# Patient Record
Sex: FEMALE | Race: ASIAN | Employment: FULL TIME | URBAN - METROPOLITAN AREA
[De-identification: names, ages, dates, MRNs, and addresses within clinical notes are randomized per-mention and may not be internally consistent; named-entity substitution may affect disease eponyms.]

---

## 2017-05-26 ENCOUNTER — EMPLOYEE WELLNESS (OUTPATIENT)
Dept: OTHER | Age: 47
End: 2017-05-26

## 2017-05-26 LAB
CHOLESTEROL/HDL RATIO: 3.4
CHOLESTEROL: 165 MG/DL
GLUCOSE BLD-MCNC: 89 MG/DL (ref 70–99)
HDLC SERPL-MCNC: 48 MG/DL
LDL CHOLESTEROL: 98 MG/DL (ref 0–130)
PATIENT FASTING?: YES
TRIGL SERPL-MCNC: 94 MG/DL
VLDLC SERPL CALC-MCNC: NORMAL MG/DL (ref 1–30)

## 2017-08-09 ENCOUNTER — HOSPITAL ENCOUNTER (EMERGENCY)
Facility: CLINIC | Age: 47
Discharge: LEFT BEFORE DISCHARGE | End: 2017-08-09
Attending: EMERGENCY MEDICINE
Payer: COMMERCIAL

## 2017-08-17 ENCOUNTER — TELEPHONE (OUTPATIENT)
Dept: FAMILY MEDICINE CLINIC | Age: 47
End: 2017-08-17

## 2017-08-17 DIAGNOSIS — R06.83 SNORING: Primary | ICD-10-CM

## 2017-08-17 DIAGNOSIS — R40.0 DAYTIME SLEEPINESS: ICD-10-CM

## 2017-08-17 DIAGNOSIS — G47.33 OSA (OBSTRUCTIVE SLEEP APNEA): ICD-10-CM

## 2017-08-25 ENCOUNTER — OFFICE VISIT (OUTPATIENT)
Dept: FAMILY MEDICINE CLINIC | Age: 47
End: 2017-08-25
Payer: COMMERCIAL

## 2017-08-25 VITALS
BODY MASS INDEX: 34.02 KG/M2 | SYSTOLIC BLOOD PRESSURE: 120 MMHG | HEART RATE: 68 BPM | DIASTOLIC BLOOD PRESSURE: 76 MMHG | HEIGHT: 63 IN | WEIGHT: 192 LBS | TEMPERATURE: 99.2 F | RESPIRATION RATE: 20 BRPM

## 2017-08-25 DIAGNOSIS — R40.0 DAYTIME SOMNOLENCE: ICD-10-CM

## 2017-08-25 DIAGNOSIS — R06.83 SNORING: Primary | ICD-10-CM

## 2017-08-25 DIAGNOSIS — M25.562 ACUTE PAIN OF LEFT KNEE: ICD-10-CM

## 2017-08-25 DIAGNOSIS — Z13.820 SCREENING FOR OSTEOPOROSIS: ICD-10-CM

## 2017-08-25 DIAGNOSIS — G47.33 OSA (OBSTRUCTIVE SLEEP APNEA): ICD-10-CM

## 2017-08-25 DIAGNOSIS — G47.33 OBSTRUCTIVE SLEEP APNEA SYNDROME: ICD-10-CM

## 2017-08-25 DIAGNOSIS — Z12.39 SCREENING FOR BREAST CANCER: ICD-10-CM

## 2017-08-25 DIAGNOSIS — M25.561 ACUTE PAIN OF RIGHT KNEE: ICD-10-CM

## 2017-08-25 DIAGNOSIS — Z00.00 ANNUAL PHYSICAL EXAM: ICD-10-CM

## 2017-08-25 DIAGNOSIS — E03.9 HYPOTHYROIDISM, UNSPECIFIED TYPE: ICD-10-CM

## 2017-08-25 PROCEDURE — 99214 OFFICE O/P EST MOD 30 MIN: CPT | Performed by: PEDIATRICS

## 2017-08-25 ASSESSMENT — ENCOUNTER SYMPTOMS
CHOKING: 0
SHORTNESS OF BREATH: 1
COUGH: 0
DIARRHEA: 0
VOMITING: 0
EYE DISCHARGE: 0
WHEEZING: 0
EYE PAIN: 0
EYE REDNESS: 0
PHOTOPHOBIA: 0
CONSTIPATION: 0
COLOR CHANGE: 0
ABDOMINAL PAIN: 0
STRIDOR: 0

## 2017-08-26 ENCOUNTER — HOSPITAL ENCOUNTER (OUTPATIENT)
Dept: GENERAL RADIOLOGY | Age: 47
Discharge: HOME | End: 2017-08-26
Payer: COMMERCIAL

## 2017-08-26 ENCOUNTER — HOSPITAL ENCOUNTER (OUTPATIENT)
Age: 47
Discharge: HOME | End: 2017-08-26
Payer: COMMERCIAL

## 2017-08-26 DIAGNOSIS — Z00.00 ANNUAL PHYSICAL EXAM: ICD-10-CM

## 2017-08-26 DIAGNOSIS — E03.9 HYPOTHYROIDISM, UNSPECIFIED TYPE: ICD-10-CM

## 2017-08-26 DIAGNOSIS — M25.562 ACUTE PAIN OF LEFT KNEE: ICD-10-CM

## 2017-08-26 DIAGNOSIS — M25.561 ACUTE PAIN OF RIGHT KNEE: ICD-10-CM

## 2017-08-26 LAB
ALBUMIN SERPL-MCNC: 3.9 G/DL (ref 3.5–5.2)
ALBUMIN/GLOBULIN RATIO: NORMAL (ref 1–2.5)
ALP BLD-CCNC: 88 U/L (ref 35–104)
ALT SERPL-CCNC: 13 U/L (ref 5–33)
ANION GAP SERPL CALCULATED.3IONS-SCNC: 10 MMOL/L (ref 9–17)
AST SERPL-CCNC: 9 U/L
BILIRUB SERPL-MCNC: 0.58 MG/DL (ref 0.3–1.2)
BILIRUBIN URINE: NEGATIVE
BUN BLDV-MCNC: 8 MG/DL (ref 6–20)
BUN/CREAT BLD: 12 (ref 9–20)
CALCIUM SERPL-MCNC: 9.8 MG/DL (ref 8.6–10.4)
CHLORIDE BLD-SCNC: 105 MMOL/L (ref 98–107)
CO2: 24 MMOL/L (ref 20–31)
COLOR: YELLOW
COMMENT UA: ABNORMAL
CREAT SERPL-MCNC: 0.67 MG/DL (ref 0.5–0.9)
GFR AFRICAN AMERICAN: >60 ML/MIN
GFR NON-AFRICAN AMERICAN: >60 ML/MIN
GFR SERPL CREATININE-BSD FRML MDRD: NORMAL ML/MIN/{1.73_M2}
GFR SERPL CREATININE-BSD FRML MDRD: NORMAL ML/MIN/{1.73_M2}
GLUCOSE BLD-MCNC: 97 MG/DL (ref 70–99)
GLUCOSE URINE: NEGATIVE
HCT VFR BLD CALC: 40.6 % (ref 36–46)
HEMOGLOBIN: 13.5 G/DL (ref 12–16)
KETONES, URINE: NEGATIVE
LEUKOCYTE ESTERASE, URINE: NEGATIVE
MCH RBC QN AUTO: 28.5 PG (ref 26–34)
MCHC RBC AUTO-ENTMCNC: 33.4 G/DL (ref 31–37)
MCV RBC AUTO: 85.5 FL (ref 80–100)
NITRITE, URINE: NEGATIVE
PDW BLD-RTO: 13.3 % (ref 11.5–14.5)
PH UA: 5 (ref 5–8)
PLATELET # BLD: 302 K/UL (ref 130–400)
PMV BLD AUTO: NORMAL FL (ref 6–12)
POTASSIUM SERPL-SCNC: 3.9 MMOL/L (ref 3.7–5.3)
PROTEIN UA: NEGATIVE
RBC # BLD: 4.74 M/UL (ref 4–5.2)
SODIUM BLD-SCNC: 139 MMOL/L (ref 135–144)
SPECIFIC GRAVITY UA: 1 (ref 1–1.03)
THYROXINE, FREE: 1.61 NG/DL (ref 0.93–1.7)
TOTAL PROTEIN: 7.5 G/DL (ref 6.4–8.3)
TSH SERPL DL<=0.05 MIU/L-ACNC: 0.63 MIU/L (ref 0.3–5)
TURBIDITY: CLEAR
URINE HGB: NEGATIVE
UROBILINOGEN, URINE: NORMAL
VITAMIN D 25-HYDROXY: 24 NG/ML (ref 30–100)
WBC # BLD: 7.4 K/UL (ref 3.5–11)

## 2017-08-26 PROCEDURE — 73560 X-RAY EXAM OF KNEE 1 OR 2: CPT

## 2017-08-26 PROCEDURE — 81003 URINALYSIS AUTO W/O SCOPE: CPT

## 2017-08-26 PROCEDURE — 82306 VITAMIN D 25 HYDROXY: CPT

## 2017-08-26 PROCEDURE — 85027 COMPLETE CBC AUTOMATED: CPT

## 2017-08-26 PROCEDURE — 36415 COLL VENOUS BLD VENIPUNCTURE: CPT

## 2017-08-26 PROCEDURE — 84443 ASSAY THYROID STIM HORMONE: CPT

## 2017-08-26 PROCEDURE — 80053 COMPREHEN METABOLIC PANEL: CPT

## 2017-08-26 PROCEDURE — 84439 ASSAY OF FREE THYROXINE: CPT

## 2017-08-30 ENCOUNTER — HOSPITAL ENCOUNTER (OUTPATIENT)
Dept: MAMMOGRAPHY | Facility: CLINIC | Age: 47
Discharge: HOME | End: 2017-08-30
Payer: COMMERCIAL

## 2017-08-30 DIAGNOSIS — Z13.820 SCREENING FOR OSTEOPOROSIS: ICD-10-CM

## 2017-08-30 DIAGNOSIS — Z12.39 SCREENING FOR BREAST CANCER: ICD-10-CM

## 2017-08-30 PROCEDURE — 77080 DXA BONE DENSITY AXIAL: CPT

## 2017-08-30 PROCEDURE — G0202 SCR MAMMO BI INCL CAD: HCPCS

## 2017-08-31 DIAGNOSIS — E03.9 HYPOTHYROIDISM, UNSPECIFIED TYPE: ICD-10-CM

## 2017-08-31 DIAGNOSIS — E55.9 VITAMIN D DEFICIENCY: Primary | ICD-10-CM

## 2017-09-06 ENCOUNTER — HOSPITAL ENCOUNTER (OUTPATIENT)
Dept: SLEEP CENTER | Age: 47
Discharge: HOME | End: 2017-09-06
Payer: COMMERCIAL

## 2017-09-06 VITALS — WEIGHT: 188 LBS | BODY MASS INDEX: 33.31 KG/M2 | HEIGHT: 63 IN

## 2017-09-06 DIAGNOSIS — R06.83 SNORING: ICD-10-CM

## 2017-09-06 DIAGNOSIS — G47.33 OSA (OBSTRUCTIVE SLEEP APNEA): ICD-10-CM

## 2017-09-06 DIAGNOSIS — R40.0 DAYTIME SLEEPINESS: ICD-10-CM

## 2017-09-27 ENCOUNTER — OFFICE VISIT (OUTPATIENT)
Dept: FAMILY MEDICINE CLINIC | Age: 47
End: 2017-09-27
Payer: COMMERCIAL

## 2017-09-27 VITALS
TEMPERATURE: 98.8 F | SYSTOLIC BLOOD PRESSURE: 118 MMHG | BODY MASS INDEX: 34.19 KG/M2 | HEART RATE: 64 BPM | RESPIRATION RATE: 18 BRPM | DIASTOLIC BLOOD PRESSURE: 76 MMHG | WEIGHT: 193 LBS

## 2017-09-27 DIAGNOSIS — E55.9 VITAMIN D DEFICIENCY: ICD-10-CM

## 2017-09-27 DIAGNOSIS — R40.0 DAYTIME SOMNOLENCE: ICD-10-CM

## 2017-09-27 DIAGNOSIS — E06.3 HYPOTHYROIDISM DUE TO HASHIMOTO'S THYROIDITIS: ICD-10-CM

## 2017-09-27 DIAGNOSIS — Z23 NEED FOR TDAP VACCINATION: ICD-10-CM

## 2017-09-27 DIAGNOSIS — E03.8 HYPOTHYROIDISM DUE TO HASHIMOTO'S THYROIDITIS: ICD-10-CM

## 2017-09-27 DIAGNOSIS — Z00.00 ROUTINE PHYSICAL EXAMINATION: Primary | ICD-10-CM

## 2017-09-27 DIAGNOSIS — R06.83 SNORING: ICD-10-CM

## 2017-09-27 DIAGNOSIS — G47.33 OBSTRUCTIVE SLEEP APNEA SYNDROME: ICD-10-CM

## 2017-09-27 DIAGNOSIS — J45.20 MILD INTERMITTENT ASTHMA WITHOUT COMPLICATION: ICD-10-CM

## 2017-09-27 DIAGNOSIS — G43.909 MIGRAINE WITHOUT STATUS MIGRAINOSUS, NOT INTRACTABLE, UNSPECIFIED MIGRAINE TYPE: ICD-10-CM

## 2017-09-27 DIAGNOSIS — J30.1 SEASONAL ALLERGIC RHINITIS DUE TO POLLEN: ICD-10-CM

## 2017-09-27 DIAGNOSIS — M81.0 OSTEOPOROSIS, UNSPECIFIED OSTEOPOROSIS TYPE, UNSPECIFIED PATHOLOGICAL FRACTURE PRESENCE: ICD-10-CM

## 2017-09-27 DIAGNOSIS — E66.9 OBESITY, UNSPECIFIED OBESITY SEVERITY, UNSPECIFIED OBESITY TYPE: ICD-10-CM

## 2017-09-27 DIAGNOSIS — K21.9 GASTROESOPHAGEAL REFLUX DISEASE, ESOPHAGITIS PRESENCE NOT SPECIFIED: ICD-10-CM

## 2017-09-27 PROCEDURE — 99396 PREV VISIT EST AGE 40-64: CPT | Performed by: PEDIATRICS

## 2017-09-27 PROCEDURE — 90715 TDAP VACCINE 7 YRS/> IM: CPT | Performed by: PEDIATRICS

## 2017-09-27 PROCEDURE — 90471 IMMUNIZATION ADMIN: CPT | Performed by: PEDIATRICS

## 2017-09-27 RX ORDER — MONTELUKAST SODIUM 10 MG/1
10 TABLET ORAL NIGHTLY
Qty: 90 TABLET | Refills: 3 | Status: SHIPPED | OUTPATIENT
Start: 2017-09-27 | End: 2018-12-05 | Stop reason: SDUPTHER

## 2017-09-27 ASSESSMENT — ENCOUNTER SYMPTOMS
EYE PAIN: 0
RHINORRHEA: 0
STRIDOR: 0
CONSTIPATION: 0
DIARRHEA: 0
VOMITING: 0
TROUBLE SWALLOWING: 0
COUGH: 0
EYE REDNESS: 0
SINUS PRESSURE: 0
BLOOD IN STOOL: 0
PHOTOPHOBIA: 0
NAUSEA: 0
VOICE CHANGE: 0
WHEEZING: 0
EYE DISCHARGE: 0
ABDOMINAL PAIN: 0
SHORTNESS OF BREATH: 0
COLOR CHANGE: 0

## 2017-09-27 ASSESSMENT — PATIENT HEALTH QUESTIONNAIRE - PHQ9
SUM OF ALL RESPONSES TO PHQ9 QUESTIONS 1 & 2: 0
1. LITTLE INTEREST OR PLEASURE IN DOING THINGS: 0
SUM OF ALL RESPONSES TO PHQ QUESTIONS 1-9: 0
2. FEELING DOWN, DEPRESSED OR HOPELESS: 0

## 2017-09-27 NOTE — MR AVS SNAPSHOT
After Visit Summary             Nahum Adrian   2017 10:40 AM   Office Visit    Description:  Female : 1970   Provider:  Colletta Legions, MD   Department:  [address] [place] [address] and Future Appointments         Below is a list of your follow-up and future appointments. This may not be a complete list as you may have made appointments directly with providers that we are not aware of or your providers may have made some for you. Please call your providers to confirm appointments. It is important to keep your appointments. Please bring your current insurance card, photo ID, co-pay, and all medication bottles to your appointment. If self-pay, payment is expected at the time of service. Information from Your Visit        Department     Name Address Phone Fax    [address] [facility] [phone]      You Were Seen for:         Comments    Routine physical examination   [[id number]]         Vital Signs     Blood Pressure Pulse Temperature Respirations Weight Last Menstrual Period    118/76 (Site: Right Arm, Position: Sitting, Cuff Size: Medium Adult) 64 98.8 °F (37.1 °C) (Tympanic) 18 193 lb (87.5 kg) 2017 (Exact Date)    Breastfeeding? Body Mass Index Smoking Status             No 34.19 kg/m2 Never Smoker         Additional Information about your Body Mass Index (BMI)           Your BMI as listed above is considered obese (30 or more). BMI is an estimate of body fat, calculated from your height and weight. The higher your BMI, the greater your risk of heart disease, high blood pressure, type 2 diabetes, stroke, gallstones, arthritis, sleep apnea, and certain cancers. BMI is not perfect. It may overestimate body fat in athletes and people who are more muscular.   Even a small weight loss (between 5 and 10

## 2017-09-27 NOTE — PROGRESS NOTES
is taking a daily calcium / vitamin D supplement (1200mg / 1600mg). She did have a recent dexascan that shows that she is now osteoporotic. She is still pre-menopausal.  She does have a history of asthma (diagnosed with PFTs in residency) and allergies that are currently well controlled with singulair, allegra and flonase. She has a history of GERD that is treated with prilosec as needed. She was supposed to have a sleep study done recently but she ended up not having this because she was going to have to pay a large fee. She is thinking about still having this done once she can make sure this is covered. She did have an original sleep study done about 15 years ago at [facility] at [address] [place] [address] in [place]. She is trying to work on getting her weight down. She has a history of migraine headaches that are controlled with maxalt as needed. She does follow with Dr. Audrey Pulido for GYN care. She tells me she was having a lot of urinary frequency and she saw Dr. Quan Suazo who recommended a hysterectomy. She did not wish to do this so when she was diagnosed with fibroid tumors and had an embolization by IR last October and her symptoms of heavy cycles improved. She does have several concerns. She complains of continued halitosis. She has actually treated herself for H. Pylori, parasites and for a yeast infection of the mouth with diflucan without any improvement. She saw ENT who did not have anything to offer and she saw a dentist who had nothing to offer. Her sense of smell has gone down and her hearing seems a little worse. She has had several small areas of rash over the body for the last week or so but this does seem to be getting better. She did have a blister on the left side of the tongue but this seems to be doing better as well. cmw      Review of Systems   Constitutional: Positive for fatigue (daytime fatigue). Negative for appetite change and fever.    HENT: Negative for congestion, ear pain, hearing loss, postnasal drip, rhinorrhea, sinus pressure, tinnitus, trouble swallowing and voice change. Allergies well controlled with singulair, allegra and flonase. She does complain of continued halitosis. Eyes: Negative for photophobia, pain, discharge and redness. Respiratory: Positive for apnea (history of SACAH diagnosed 15 years ago). Negative for cough, shortness of breath, wheezing and stridor. Snoring   Cardiovascular: Negative for chest pain, palpitations and leg swelling. Gastrointestinal: Negative for abdominal pain, blood in stool, constipation, diarrhea, nausea and vomiting. Occasional heartburn responds to omeprazole 40mg once daily    Endocrine: Negative for polydipsia and polyphagia. Hypothyroidism controlled. Genitourinary: Negative for dysuria, frequency, hematuria and pelvic pain. Skin: Negative for color change and rash. Allergic/Immunologic: Positive for environmental allergies. Negative for food allergies and immunocompromised state. Neurological: Positive for headaches (history of migraine headaches). Negative for dizziness, weakness, light-headedness and numbness. Psychiatric/Behavioral: Negative for agitation, decreased concentration, dysphoric mood and sleep disturbance. The patient is not nervous/anxious. Objective:   Physical Exam   Constitutional: She is oriented to person, place, and time. She appears well-developed and well-nourished. obese   HENT:   Head: Normocephalic. Right Ear: External ear normal.   Left Ear: External ear normal.   Nose: Nose normal.   Mouth/Throat: Oropharynx is clear and moist. No oropharyngeal exudate. I am not able to appreciate any abnormal smell to the patient's breath. Eyes: Conjunctivae, EOM and lids are normal. Pupils are equal, round, and reactive to light. Right eye exhibits no discharge. Left eye exhibits no discharge. No scleral icterus.    Neck: Trachea normal and normal range of motion. Neck supple. Carotid bruit is not present. Cardiovascular: Normal rate, regular rhythm and normal heart sounds. Exam reveals no distant heart sounds. No murmur heard. Pulses:       Carotid pulses are 2+ on the right side, and 2+ on the left side. Radial pulses are 2+ on the right side, and 2+ on the left side. Femoral pulses are 2+ on the right side, and 2+ on the left side. Pulmonary/Chest: Effort normal and breath sounds normal. No stridor. She has no wheezes. She has no rales. Abdominal: Soft. Normal appearance and bowel sounds are normal. There is no tenderness. There is no rebound, no guarding and no CVA tenderness. Musculoskeletal: Normal range of motion. She exhibits no deformity. No red or swollen joints. Lymphadenopathy:     She has no cervical adenopathy. Neurological: She is alert and oriented to person, place, and time. She has normal strength and normal reflexes. No cranial nerve deficit. Coordination normal.   Skin: Skin is warm. No rash noted. Psychiatric: She has a normal mood and affect. Her speech is normal and behavior is normal. Judgment normal. Cognition and memory are normal.   Nursing note and vitals reviewed. Assessment:      1. Routine physical examination     2. Hypothyroidism due to Hashimoto's thyroiditis     3. Osteoporosis, unspecified osteoporosis type, unspecified pathological fracture presence  Marcie Hughes MD, Endocrinology [facility]    CANCELED: [address] MD   4. Vitamin D deficiency  Marcie Hughes MD, Endocrinology [facility]    CANCELED: [address] MD   5. Mild intermittent asthma without complication  montelukast (SINGULAIR) 10 MG tablet   6. Seasonal allergic rhinitis due to pollen  montelukast (SINGULAIR) 10 MG tablet   7.  Gastroesophageal reflux disease, esophagitis presence not specified     8. Snoring     9. Daytime somnolence     10. Obstructive sleep apnea syndrome     11. Obesity, unspecified obesity severity, unspecified obesity type     12. Migraine without status migrainosus, not intractable, unspecified migraine type     13. Need for Tdap vaccination  Tdap (age 10y-63y) IM (ADACEL)           Plan:      Proceed with review recent labs   Recommend continue current medications  Advise daily exercise / healthy diet  Weight reduction recommended   Advise referral to endocrinology for evaluation and management of premenopausal osteoporosis   Continue calcium and vitamin D supplementation   Advise obtain sleep study results from previous hospital   Obtain new sleep study as recommended   Recommend Tdap today   Flu vaccine recommended - patient will get at work   Call with concerns     Eliot Roche received counseling on the following healthy behaviors: nutrition, exercise and medication adherence  Reviewed prior labs and health maintenance  Continue current medications, diet and exercise. Discussed use, benefit, and side effects of prescribed medications. Barriers to medication compliance addressed. Patient given educational materials - see patient instructions  Was a self-tracking handout given in paper form or via [email or web address]? No    Requested Prescriptions     Signed Prescriptions Disp Refills    montelukast (SINGULAIR) 10 MG tablet 90 tablet 3     Sig: Take 1 tablet by mouth nightly       All patient questions answered. Patient voiced understanding. Quality Measures    Body mass index is 34.19 kg/(m^2). Elevated. Weight control planned discussed Healthy diet and regular exercise. BP: 118/76 Blood pressure is normal. Treatment plan consists of No treatment change needed.     Lab Results   Component Value Date    LDLCHOLESTEROL 98 05/26/2017    (goal LDL reduction with dx if diabetes is 50% LDL reduction)      PHQ Scores 9/27/2017 2/22/2016 6/17/2015   PHQ2 Score 0 0 0   PHQ9 Score 0 0 0

## 2017-10-01 PROBLEM — E06.3 HYPOTHYROIDISM DUE TO HASHIMOTO'S THYROIDITIS: Status: ACTIVE | Noted: 2017-10-01

## 2017-10-01 PROBLEM — E03.8 HYPOTHYROIDISM DUE TO HASHIMOTO'S THYROIDITIS: Status: ACTIVE | Noted: 2017-10-01

## 2017-10-01 ASSESSMENT — ENCOUNTER SYMPTOMS: APNEA: 1

## 2017-10-04 ENCOUNTER — INITIAL CONSULT (OUTPATIENT)
Dept: ENDOCRINOLOGY | Age: 47
End: 2017-10-04
Payer: COMMERCIAL

## 2017-10-04 VITALS
HEIGHT: 63 IN | SYSTOLIC BLOOD PRESSURE: 124 MMHG | WEIGHT: 195 LBS | BODY MASS INDEX: 34.55 KG/M2 | DIASTOLIC BLOOD PRESSURE: 86 MMHG | HEART RATE: 89 BPM

## 2017-10-04 DIAGNOSIS — E03.8 HYPOTHYROIDISM DUE TO HASHIMOTO'S THYROIDITIS: ICD-10-CM

## 2017-10-04 DIAGNOSIS — E06.3 HYPOTHYROIDISM DUE TO HASHIMOTO'S THYROIDITIS: ICD-10-CM

## 2017-10-04 DIAGNOSIS — M81.8 OTHER OSTEOPOROSIS WITHOUT CURRENT PATHOLOGICAL FRACTURE: Primary | ICD-10-CM

## 2017-10-04 PROCEDURE — 99244 OFF/OP CNSLTJ NEW/EST MOD 40: CPT | Performed by: INTERNAL MEDICINE

## 2017-10-04 RX ORDER — LEVOTHYROXINE SODIUM 137 UG/1
137 TABLET ORAL DAILY
COMMUNITY
End: 2018-02-13 | Stop reason: SDUPTHER

## 2017-10-04 NOTE — PROGRESS NOTES
UNITS CAPS Take 50,000 Units by mouth      albuterol sulfate HFA (PROAIR HFA) 108 (90 BASE) MCG/ACT inhaler Inhale 2 puffs into the lungs every 6 hours as needed for Wheezing 3 Inhaler 3    omeprazole (PRILOSEC) 40 MG capsule Take 1 capsule by mouth daily 90 capsule 3    calcium carbonate 600 MG TABS tablet Take 1 tablet by mouth daily      fexofenadine (ALLEGRA ALLERGY) 180 MG tablet Take 180 mg by mouth daily       No current facility-administered medications for this visit. No Known Allergies    Subjective:      Review of Systems     CONSTITUTIONAL :   GAINED 7 LB IN LAST 4 WEEKS. . FEELS TIRED . Head:   HEAD ACHES OFF AND ON TAKES TYLENOL  Eyes: NO VISION CHANGES  ENT:   HX OF ALLERGIC RHINITIS  Cardiovascular:   NO HYPERTENSION. NO CHEST PAIN. NO CHOLESTEROL PROBLEM  Peripheral Vascular:  NO CLAUDICATION  Respiratory:    HAS EXERCISE INDUCED ASTHMA TAKES PRN PROAIR  Gastro - Interestinal:   OCCASIONAL ACID REFLUX TAKES NEXIUM PRN. HAS LACTOSE INTOLERANCE. BOWEL O.K. NO ABDOMINAL PAIN  Genito - Urinary:   NO   POLY URIA . NO DYSURIA  GYN : HAS REGULAR PERIODS. HX OF UTERINE FIBROIDS , HAD EMBOLIZATION THERAPY  Rheumatological: NO ARTHRITIC PAINS. NO BONE PAINS  Neurological:  NO HX OF SEIZURES. HX OF HEAD ACHES OFF AND ON  Hematological: NO HX OF ANEMIA OR BLEEDING  Endocrine: NO HX OF  DIABETES . HYPOTHYROIDISM AS NOTED ABOVE  Dermatological: NO RASHES  Psychiatric:    NO HX OF ANXIETY OR DEPRESSION        Objective:      Physical Exam    50YEAR OLD  [place] FEMALE OBESE, NO DISTRESS  VITAL SIGNS REVIEWED. Eyes:  KARI  ENT: UNREMARKABLE. NO SINUS TENDERNESS. HX OF ALLERGIC RHINITIS  Neck: NO ACANTHOSIS. NO MASSES. NO ADENOPATHY. THYROID NOT ENALARGED. FAINT RT CAROTID BRUIT PRESENT. Heart:  REGULAR. NO MURMUR   Lungs: BREATHING COMFORTABLY. LUNGS CLEAR. NO WHEEZES  Abdomen: SOFT. NO TENDERNESS. NO MASSES. LIVER AND SPLEEN NOT PALPABLE  Extremities:  NO EDEMA. NO CALF TENDERNESS. Rheumatological: NO JOINT SWELLING. NO BACK TENDERNESS  Neurological/Memory: ALERT AND ORIENTED X 3.  Psychiatric: NORMAL MOOD AND AFFECT. Skin: NO RASHES      Assessment:     LAB  8/26/17    TSH  0.63. FREE T4 1.51 ( NORMAL 0.93 TO 1.70 )  CBC HGB 13.5, WBC 7.4, PLAT 302  GLU 97, BUN 8, CREAT  0.67  CA 9.8, LYTES 139/3.9, ALK. PHOS  88, ALT 13, ALB 3.9  25 OH VIT D3 24.0    DEXA SCAN   8/30/17      L1- L4 T SCORE  MINUS 2.8. LEFT HIP TOTAL T SCORE  MINUS 1.3    DEXA  SCAN 8/12/14     L1- L4  T SCORE MINUS 1.9, LEFT HIP TOTAL T SCORE  MINUS 1.1, RT HIP  TOTAL T SCORE MINUS 1.0    IMPRESSION :    OSTEOPOROSIS LUMBAR SPINE   T SCORE WORSENED FROM MINUS 1.9 ( ON 8/12/14 ) TO MINUS  2.8( ON  8/30/17)    HYPOTHYROIDISM PRIMARY ACQUIRED TSH ON 8/26/17  0.63    PAST HX RT WRIST FRACTURE  2014    HX OF LACTOSE INTOLERANCE    PERIMENOPAUSAL     OBESITY    VIT D INSUFFICIENCY    OBESITY    HX OF ALLERGIC RHINITIS AND EXERCISE INDUCED ASTHMA    HX OF SNORING AND SLEEP DISTURBANCE           1. Other osteoporosis without current pathological fracture    2. Hypothyroidism due to Hashimoto's thyroiditis          Plan:      1. ALL LAB AND DEXA REPORTS DISCUSSED  2. GET LAB AS ORDERED. 3. DIET AND EXERCISE. WEIGHT BEARING  4. VIT D SUPPLEMENTS  5. CONTINUE LEVOTHYROXINE  137 MCG DAILY WITH PERIODIC TSH MONITORING TO AVOID  HYPERTHYROIDISM  6. DISCUSSED BONE BIOPSY OPTION FOR FURTHER EVALUATION  7. DISCUSSED BISPHOSPHONATE THERAPY AND SIDE EFFECTS. SHE WANTS TO WAIT. 8. PATIENT WILL CALL FOR NEXT APPOINTMENT.         Orders Placed This Encounter   Procedures    PTH, Intact With Ionized Calcium     Standing Status:   Future     Standing Expiration Date:   10/4/2018    Calcium, 24 Hr Urine (w/ Creatinine)     Standing Status:   Future     Standing Expiration Date:   10/4/2018    Luteinizing Hormone     Standing Status:   Future     Standing Expiration Date:   25/5/7257    Follicle Stimulating Hormone     Standing Status: Future     Standing Expiration Date:   10/4/2018    Estradiol     Standing Status:   Future     Standing Expiration Date:   10/4/2018    Collagen N-Telopep,Urine     No orders of the defined types were placed in this encounter. Return if symptoms worsen or fail to improve, for OSTEOPOROSIS. Patient given educational materials - see patient instructions. Discussed use, benefit, and side effects of prescribed medications. All patient questions answered. Pt voiced understanding. Reviewed health maintenance. Instructed to continue current medications, diet and exercise. Patient agreed with treatment plan. Follow up as directed.      Electronically signed by Adonis Sierra MD on 10/4/17 at 4:47 PM

## 2017-10-09 ENCOUNTER — TELEPHONE (OUTPATIENT)
Dept: ENDOCRINOLOGY | Age: 47
End: 2017-10-09

## 2017-10-09 RX ORDER — IBANDRONATE SODIUM 150 MG/1
150 TABLET, FILM COATED ORAL
Qty: 1 TABLET | Refills: 5 | Status: SHIPPED | OUTPATIENT
Start: 2017-10-09 | End: 2018-01-15 | Stop reason: SDUPTHER

## 2017-10-09 NOTE — TELEPHONE ENCOUNTER
Patient called and stated she would like to go ahead with the medication therapy that was discussed at her initial appointment. Her pharmacy is [facility] outpatient pharmacy.

## 2017-11-16 ENCOUNTER — TELEPHONE (OUTPATIENT)
Dept: FAMILY MEDICINE CLINIC | Age: 47
End: 2017-11-16

## 2017-11-16 NOTE — TELEPHONE ENCOUNTER
Patient contacted office and states she wants Dr Tj Vines to call back. Patient refused to disclose reason for call.  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

## 2018-01-15 RX ORDER — IBANDRONATE SODIUM 150 MG/1
TABLET, FILM COATED ORAL
Qty: 1 TABLET | Refills: 2 | Status: SHIPPED | OUTPATIENT
Start: 2018-01-15 | End: 2018-02-13 | Stop reason: SDUPTHER

## 2018-02-13 DIAGNOSIS — M85.80 OSTEOPENIA, UNSPECIFIED LOCATION: ICD-10-CM

## 2018-02-13 DIAGNOSIS — E55.9 VITAMIN D DEFICIENCY: ICD-10-CM

## 2018-02-13 RX ORDER — MOMETASONE FUROATE 50 UG/1
2 SPRAY, METERED NASAL DAILY
Qty: 1 INHALER | Refills: 3 | Status: SHIPPED | OUTPATIENT
Start: 2018-02-13 | End: 2019-06-26 | Stop reason: ALTCHOICE

## 2018-02-13 RX ORDER — IBANDRONATE SODIUM 150 MG/1
150 TABLET, FILM COATED ORAL
Qty: 3 TABLET | Refills: 3 | Status: SHIPPED | OUTPATIENT
Start: 2018-02-13 | End: 2018-12-18 | Stop reason: SDUPTHER

## 2018-02-13 RX ORDER — CHOLECALCIFEROL (VITAMIN D3) 1250 MCG
50000 CAPSULE ORAL WEEKLY
Qty: 13 CAPSULE | Refills: 3 | Status: SHIPPED | OUTPATIENT
Start: 2018-02-13 | End: 2019-06-26 | Stop reason: ALTCHOICE

## 2018-02-13 RX ORDER — LEVOTHYROXINE SODIUM 137 UG/1
137 TABLET ORAL DAILY
Qty: 90 TABLET | Refills: 3 | Status: SHIPPED | OUTPATIENT
Start: 2018-02-13 | End: 2018-11-08 | Stop reason: SDUPTHER

## 2018-03-20 VITALS — BODY MASS INDEX: 33.83 KG/M2 | WEIGHT: 191 LBS

## 2018-03-21 ENCOUNTER — HOSPITAL ENCOUNTER (OUTPATIENT)
Dept: CT IMAGING | Facility: CLINIC | Age: 48
Discharge: HOME | End: 2018-03-23
Payer: COMMERCIAL

## 2018-03-21 DIAGNOSIS — R51.9 NONINTRACTABLE HEADACHE, UNSPECIFIED CHRONICITY PATTERN, UNSPECIFIED HEADACHE TYPE: ICD-10-CM

## 2018-03-21 DIAGNOSIS — J32.0 CHRONIC MAXILLARY SINUSITIS: ICD-10-CM

## 2018-03-21 DIAGNOSIS — R51.9 FACIAL PAIN: ICD-10-CM

## 2018-03-21 DIAGNOSIS — J32.1 CHRONIC FRONTAL SINUSITIS: ICD-10-CM

## 2018-03-21 PROCEDURE — 70486 CT MAXILLOFACIAL W/O DYE: CPT

## 2018-04-19 ENCOUNTER — HOSPITAL ENCOUNTER (OUTPATIENT)
Dept: CT IMAGING | Age: 48
Discharge: HOME | End: 2018-04-21
Payer: COMMERCIAL

## 2018-04-19 DIAGNOSIS — J32.0 CHRONIC MAXILLARY SINUSITIS: ICD-10-CM

## 2018-04-19 DIAGNOSIS — J32.1 CHRONIC FRONTAL SINUSITIS: ICD-10-CM

## 2018-04-19 DIAGNOSIS — J32.2 CHRONIC ANTERIOR ETHMOIDAL SINUSITIS: ICD-10-CM

## 2018-04-19 DIAGNOSIS — J32.3 CHRONIC SPHENOIDAL SINUSITIS: ICD-10-CM

## 2018-04-19 PROCEDURE — 70486 CT MAXILLOFACIAL W/O DYE: CPT

## 2018-04-20 ENCOUNTER — HOSPITAL ENCOUNTER (OUTPATIENT)
Age: 48
Setting detail: SPECIMEN
Discharge: HOME | End: 2018-04-20
Payer: COMMERCIAL

## 2018-04-24 LAB — SURGICAL PATHOLOGY REPORT: NORMAL

## 2018-05-11 ENCOUNTER — EMPLOYEE WELLNESS (OUTPATIENT)
Dept: OTHER | Age: 48
End: 2018-05-11

## 2018-05-11 LAB
CHOLESTEROL/HDL RATIO: 3.4
CHOLESTEROL: 164 MG/DL
GLUCOSE BLD-MCNC: 98 MG/DL (ref 70–99)
HDLC SERPL-MCNC: 48 MG/DL
LDL CHOLESTEROL: 94 MG/DL (ref 0–130)
PATIENT FASTING?: NORMAL
TRIGL SERPL-MCNC: 109 MG/DL
VLDLC SERPL CALC-MCNC: NORMAL MG/DL (ref 1–30)

## 2018-05-21 VITALS — BODY MASS INDEX: 33.83 KG/M2 | WEIGHT: 191 LBS

## 2018-06-12 DIAGNOSIS — K21.9 GASTROESOPHAGEAL REFLUX DISEASE, ESOPHAGITIS PRESENCE NOT SPECIFIED: ICD-10-CM

## 2018-06-12 RX ORDER — OMEPRAZOLE 40 MG/1
40 CAPSULE, DELAYED RELEASE ORAL DAILY
Qty: 90 CAPSULE | Refills: 3 | Status: SHIPPED | OUTPATIENT
Start: 2018-06-12 | End: 2019-06-26 | Stop reason: ALTCHOICE

## 2018-07-18 ENCOUNTER — OFFICE VISIT (OUTPATIENT)
Dept: FAMILY MEDICINE CLINIC | Age: 48
End: 2018-07-18
Payer: COMMERCIAL

## 2018-07-18 VITALS
RESPIRATION RATE: 16 BRPM | SYSTOLIC BLOOD PRESSURE: 124 MMHG | DIASTOLIC BLOOD PRESSURE: 84 MMHG | BODY MASS INDEX: 34.37 KG/M2 | WEIGHT: 194 LBS | HEART RATE: 80 BPM | TEMPERATURE: 98 F

## 2018-07-18 DIAGNOSIS — G47.33 OBSTRUCTIVE SLEEP APNEA SYNDROME: ICD-10-CM

## 2018-07-18 DIAGNOSIS — R40.0 DAYTIME SOMNOLENCE: ICD-10-CM

## 2018-07-18 DIAGNOSIS — R06.83 SNORING: Primary | ICD-10-CM

## 2018-07-18 PROCEDURE — 99213 OFFICE O/P EST LOW 20 MIN: CPT | Performed by: PEDIATRICS

## 2018-07-18 ASSESSMENT — ENCOUNTER SYMPTOMS
EYE PAIN: 0
CONSTIPATION: 0
EYE REDNESS: 0
STRIDOR: 0
NAUSEA: 0
EYE DISCHARGE: 0
COUGH: 0
PHOTOPHOBIA: 0
COLOR CHANGE: 0
DIARRHEA: 0
SHORTNESS OF BREATH: 0
APNEA: 1
ABDOMINAL PAIN: 0
WHEEZING: 0
VOMITING: 0

## 2018-07-18 NOTE — PROGRESS NOTES
present. Cardiovascular: Normal rate, regular rhythm and normal heart sounds. Exam reveals no distant heart sounds. No murmur heard. Pulses:       Carotid pulses are 2+ on the right side, and 2+ on the left side. Radial pulses are 2+ on the right side, and 2+ on the left side. Pulmonary/Chest: Effort normal and breath sounds normal. No stridor. She has no wheezes. She has no rales. Abdominal: Soft. Normal appearance and bowel sounds are normal. There is no tenderness. There is no rebound, no guarding and no CVA tenderness. Musculoskeletal: She exhibits no deformity. Lymphadenopathy:     She has no cervical adenopathy. Neurological: She is alert and oriented to person, place, and time. She has normal strength and normal reflexes. No cranial nerve deficit. Coordination normal.   Skin: Skin is warm. No rash noted. She is not diaphoretic. Psychiatric: She has a normal mood and affect. Her speech is normal and behavior is normal. Judgment normal. Cognition and memory are normal.   Nursing note and vitals reviewed. Assessment:      Diagnosis Orders   1. Snoring  Baseline Diagnostic Sleep Study   2. Daytime somnolence  Baseline Diagnostic Sleep Study   3. Obstructive sleep apnea syndrome  Baseline Diagnostic Sleep Study       Plan:     Proceed with obtain sleep study  Consider pulmonology referral after review of sleep study results  Daily exercise and healthy diet encouraged  Weight reduction encouraged  Call with concerns      No Follow-up on file. Hafsa Pandey received counseling on the following healthy behaviors: nutrition, exercise and medication adherence  Reviewed prior labs and health maintenance. Continue current medications, diet and exercise. Discussed use, benefit, and side effects of prescribed medications. Barriers to medication compliance addressed. Patient given educational materials - see patient instructions. All patient questions answered. Patient voiced understanding.

## 2018-07-19 ENCOUNTER — PATIENT MESSAGE (OUTPATIENT)
Dept: FAMILY MEDICINE CLINIC | Age: 48
End: 2018-07-19

## 2018-07-19 NOTE — TELEPHONE ENCOUNTER
From: Daryl Mart  To: Phillip Lundberg MD  Sent: 7/19/2018 1:13 PM EDT  Subject: Visit Follow-Up Question    Hi ,   Do you use [employer]. It is really cool. I see your name, but you are not active. I just started using it after  told me about it. It is secure to communicate with other doctors about patients etc. Can even make calls and send documents etc on it.   Thanks,  Mena Bo

## 2018-07-21 ENCOUNTER — HOSPITAL ENCOUNTER (OUTPATIENT)
Dept: SLEEP CENTER | Age: 48
Discharge: HOME | End: 2018-07-23
Payer: COMMERCIAL

## 2018-07-21 DIAGNOSIS — R06.83 SNORING: ICD-10-CM

## 2018-07-21 DIAGNOSIS — G47.33 OBSTRUCTIVE SLEEP APNEA SYNDROME: ICD-10-CM

## 2018-07-21 DIAGNOSIS — R40.0 DAYTIME SOMNOLENCE: ICD-10-CM

## 2018-07-21 PROCEDURE — 95810 POLYSOM 6/> YRS 4/> PARAM: CPT

## 2018-07-22 VITALS — BODY MASS INDEX: 34.38 KG/M2 | WEIGHT: 194 LBS | HEIGHT: 63 IN | HEART RATE: 62 BPM | RESPIRATION RATE: 16 BRPM

## 2018-07-26 ENCOUNTER — TELEPHONE (OUTPATIENT)
Dept: FAMILY MEDICINE CLINIC | Age: 48
End: 2018-07-26

## 2018-07-26 DIAGNOSIS — G47.33 OBSTRUCTIVE SLEEP APNEA: Primary | ICD-10-CM

## 2018-07-28 ENCOUNTER — HOSPITAL ENCOUNTER (OUTPATIENT)
Dept: SLEEP CENTER | Age: 48
Discharge: HOME | End: 2018-07-30
Payer: COMMERCIAL

## 2018-07-28 DIAGNOSIS — G47.33 OBSTRUCTIVE SLEEP APNEA: ICD-10-CM

## 2018-07-28 PROCEDURE — 95811 POLYSOM 6/>YRS CPAP 4/> PARM: CPT

## 2018-08-21 LAB
STATUS: NORMAL
STATUS: NORMAL

## 2018-08-30 DIAGNOSIS — G47.33 OSA (OBSTRUCTIVE SLEEP APNEA): Primary | ICD-10-CM

## 2018-11-08 RX ORDER — LEVOTHYROXINE SODIUM 137 UG/1
137 TABLET ORAL DAILY
Qty: 90 TABLET | Refills: 3 | Status: SHIPPED | OUTPATIENT
Start: 2018-11-08 | End: 2019-12-11 | Stop reason: DRUGHIGH

## 2018-12-05 ENCOUNTER — OFFICE VISIT (OUTPATIENT)
Dept: FAMILY MEDICINE CLINIC | Age: 48
End: 2018-12-05
Payer: COMMERCIAL

## 2018-12-05 ENCOUNTER — HOSPITAL ENCOUNTER (OUTPATIENT)
Age: 48
Setting detail: SPECIMEN
Discharge: HOME | End: 2018-12-05
Payer: COMMERCIAL

## 2018-12-05 VITALS
HEIGHT: 63 IN | BODY MASS INDEX: 34.2 KG/M2 | DIASTOLIC BLOOD PRESSURE: 76 MMHG | WEIGHT: 193 LBS | RESPIRATION RATE: 16 BRPM | SYSTOLIC BLOOD PRESSURE: 122 MMHG | HEART RATE: 76 BPM | TEMPERATURE: 98.5 F

## 2018-12-05 DIAGNOSIS — E06.3 HYPOTHYROIDISM DUE TO HASHIMOTO'S THYROIDITIS: ICD-10-CM

## 2018-12-05 DIAGNOSIS — Z12.12 SCREENING FOR COLORECTAL CANCER: ICD-10-CM

## 2018-12-05 DIAGNOSIS — G47.33 OBSTRUCTIVE SLEEP APNEA SYNDROME: ICD-10-CM

## 2018-12-05 DIAGNOSIS — Z12.39 SCREENING FOR BREAST CANCER: ICD-10-CM

## 2018-12-05 DIAGNOSIS — Z12.11 SCREENING FOR COLORECTAL CANCER: ICD-10-CM

## 2018-12-05 DIAGNOSIS — E73.9 LACTOSE INTOLERANCE: ICD-10-CM

## 2018-12-05 DIAGNOSIS — Z00.00 ROUTINE PHYSICAL EXAMINATION: ICD-10-CM

## 2018-12-05 DIAGNOSIS — M25.50 ARTHRALGIA, UNSPECIFIED JOINT: ICD-10-CM

## 2018-12-05 DIAGNOSIS — K21.9 GASTROESOPHAGEAL REFLUX DISEASE, ESOPHAGITIS PRESENCE NOT SPECIFIED: ICD-10-CM

## 2018-12-05 DIAGNOSIS — J30.1 SEASONAL ALLERGIC RHINITIS DUE TO POLLEN: ICD-10-CM

## 2018-12-05 DIAGNOSIS — E55.9 VITAMIN D DEFICIENCY: ICD-10-CM

## 2018-12-05 DIAGNOSIS — E03.8 HYPOTHYROIDISM DUE TO HASHIMOTO'S THYROIDITIS: ICD-10-CM

## 2018-12-05 DIAGNOSIS — M81.0 OSTEOPOROSIS, UNSPECIFIED OSTEOPOROSIS TYPE, UNSPECIFIED PATHOLOGICAL FRACTURE PRESENCE: ICD-10-CM

## 2018-12-05 DIAGNOSIS — G43.909 MIGRAINE WITHOUT STATUS MIGRAINOSUS, NOT INTRACTABLE, UNSPECIFIED MIGRAINE TYPE: ICD-10-CM

## 2018-12-05 DIAGNOSIS — J45.20 MILD INTERMITTENT ASTHMA WITHOUT COMPLICATION: ICD-10-CM

## 2018-12-05 DIAGNOSIS — Z00.00 ROUTINE PHYSICAL EXAMINATION: Primary | ICD-10-CM

## 2018-12-05 DIAGNOSIS — Z23 NEED FOR INFLUENZA VACCINATION: ICD-10-CM

## 2018-12-05 LAB
ALBUMIN SERPL-MCNC: 3.8 G/DL (ref 3.5–5.2)
ALBUMIN/GLOBULIN RATIO: 1.1 (ref 1–2.5)
ALP BLD-CCNC: 99 U/L (ref 35–104)
ALT SERPL-CCNC: 19 U/L (ref 5–33)
ANION GAP SERPL CALCULATED.3IONS-SCNC: 12 MMOL/L (ref 9–17)
AST SERPL-CCNC: 17 U/L
BILIRUB SERPL-MCNC: 0.68 MG/DL (ref 0.3–1.2)
BUN BLDV-MCNC: 6 MG/DL (ref 6–20)
BUN/CREAT BLD: NORMAL (ref 9–20)
CALCIUM SERPL-MCNC: 9.7 MG/DL (ref 8.6–10.4)
CHLORIDE BLD-SCNC: 107 MMOL/L (ref 98–107)
CO2: 21 MMOL/L (ref 20–31)
CREAT SERPL-MCNC: 0.55 MG/DL (ref 0.5–0.9)
GFR AFRICAN AMERICAN: >60 ML/MIN
GFR NON-AFRICAN AMERICAN: >60 ML/MIN
GFR SERPL CREATININE-BSD FRML MDRD: NORMAL ML/MIN/{1.73_M2}
GFR SERPL CREATININE-BSD FRML MDRD: NORMAL ML/MIN/{1.73_M2}
GLUCOSE BLD-MCNC: 82 MG/DL (ref 70–99)
HCT VFR BLD CALC: 39.4 % (ref 36.3–47.1)
HEMOGLOBIN: 12.4 G/DL (ref 11.9–15.1)
MCH RBC QN AUTO: 27.8 PG (ref 25.2–33.5)
MCHC RBC AUTO-ENTMCNC: 31.5 G/DL (ref 28.4–34.8)
MCV RBC AUTO: 88.3 FL (ref 82.6–102.9)
NRBC AUTOMATED: 0 PER 100 WBC
PDW BLD-RTO: 12.8 % (ref 11.8–14.4)
PLATELET # BLD: 297 K/UL (ref 138–453)
PMV BLD AUTO: 10.1 FL (ref 8.1–13.5)
POTASSIUM SERPL-SCNC: 4.1 MMOL/L (ref 3.7–5.3)
RBC # BLD: 4.46 M/UL (ref 3.95–5.11)
SODIUM BLD-SCNC: 140 MMOL/L (ref 135–144)
TOTAL PROTEIN: 7.2 G/DL (ref 6.4–8.3)
VITAMIN B-12: 391 PG/ML (ref 232–1245)
WBC # BLD: 7.5 K/UL (ref 3.5–11.3)

## 2018-12-05 PROCEDURE — 99396 PREV VISIT EST AGE 40-64: CPT | Performed by: PEDIATRICS

## 2018-12-05 RX ORDER — FEXOFENADINE HCL 180 MG/1
180 TABLET ORAL DAILY
Qty: 90 TABLET | Refills: 3 | Status: SHIPPED | OUTPATIENT
Start: 2018-12-05 | End: 2019-06-26 | Stop reason: ALTCHOICE

## 2018-12-05 RX ORDER — MONTELUKAST SODIUM 10 MG/1
10 TABLET ORAL NIGHTLY
Qty: 90 TABLET | Refills: 3 | Status: SHIPPED | OUTPATIENT
Start: 2018-12-05 | End: 2020-03-04

## 2018-12-05 ASSESSMENT — ENCOUNTER SYMPTOMS
COUGH: 0
COLOR CHANGE: 0
EYE DISCHARGE: 0
EYE REDNESS: 0
CONSTIPATION: 0
VOICE CHANGE: 0
VOMITING: 0
NAUSEA: 0
SHORTNESS OF BREATH: 0
APNEA: 1
RHINORRHEA: 0
ABDOMINAL PAIN: 0
WHEEZING: 0
BLOOD IN STOOL: 0
SINUS PRESSURE: 0
TROUBLE SWALLOWING: 0
STRIDOR: 0
PHOTOPHOBIA: 0
EYE PAIN: 0
DIARRHEA: 0

## 2018-12-05 ASSESSMENT — PATIENT HEALTH QUESTIONNAIRE - PHQ9
SUM OF ALL RESPONSES TO PHQ QUESTIONS 1-9: 0
SUM OF ALL RESPONSES TO PHQ9 QUESTIONS 1 & 2: 0
SUM OF ALL RESPONSES TO PHQ QUESTIONS 1-9: 0
1. LITTLE INTEREST OR PLEASURE IN DOING THINGS: 0
2. FEELING DOWN, DEPRESSED OR HOPELESS: 0

## 2018-12-05 NOTE — PROGRESS NOTES
left side. Radial pulses are 2+ on the right side, and 2+ on the left side. Femoral pulses are 2+ on the right side, and 2+ on the left side. Pulmonary/Chest: Effort normal and breath sounds normal. No stridor. She has no wheezes. She has no rales. Abdominal: Soft. Normal appearance and bowel sounds are normal. There is no tenderness. There is no rebound, no guarding and no CVA tenderness. Musculoskeletal: Normal range of motion. She exhibits no deformity. No red or swollen joints. Lymphadenopathy:     She has no cervical adenopathy. Neurological: She is alert and oriented to person, place, and time. She has normal strength and normal reflexes. No cranial nerve deficit. Coordination normal.   Skin: Skin is warm. No rash noted. Psychiatric: She has a normal mood and affect. Her speech is normal and behavior is normal. Judgment normal. Cognition and memory are normal.   Nursing note and vitals reviewed. Assessment:      Diagnosis Orders   1. Routine physical examination  Comprehensive Metabolic Panel    CBC   2. Hypothyroidism due to Hashimoto's thyroiditis  TSH without Reflex    T4, Free    Comprehensive Metabolic Panel    CBC   3. Osteoporosis, unspecified osteoporosis type, unspecified pathological fracture presence  DEXA Bone Density 2 Sites   4. Vitamin D deficiency  Vitamin D 25 Hydroxy   5. Mild intermittent asthma without complication  montelukast (SINGULAIR) 10 MG tablet   6. Seasonal allergic rhinitis due to pollen  montelukast (SINGULAIR) 10 MG tablet    fexofenadine (ALLEGRA ALLERGY) 180 MG tablet   7. Obstructive sleep apnea syndrome  CBC   8. Gastroesophageal reflux disease, esophagitis presence not specified  Celiac Disease Panel    Vitamin B12    CBC   9. Migraine without status migrainosus, not intractable, unspecified migraine type     10. Arthralgia, unspecified joint  Celiac Disease Panel   11. Lactose intolerance  Celiac Disease Panel   12.  Need for influenza Interpretation of Total Score Depression Severity: 1-4 = Minimal depression, 5-9 = Mild depression, 10-14 = Moderate depression, 15-19 = Moderately severe depression, 20-27 = Severe depression      Electronically signed by Deanna Ortiz MD on 12/9/2018 at 10:58 AM

## 2018-12-06 LAB
GLIADIN DEAMINIDATED PEPTIDE AB IGA: 1 U/ML
GLIADIN DEAMINIDATED PEPTIDE AB IGG: 0.6 U/ML
IGA: 236 MG/DL (ref 70–400)
THYROXINE, FREE: 1.7 NG/DL (ref 0.93–1.7)
TISSUE TRANSGLUTAMINASE IGA: 0.7 U/ML
TSH SERPL DL<=0.05 MIU/L-ACNC: 0.18 MIU/L (ref 0.3–5)
VITAMIN D 25-HYDROXY: 29.7 NG/ML (ref 30–100)

## 2018-12-10 ENCOUNTER — TELEPHONE (OUTPATIENT)
Dept: FAMILY MEDICINE CLINIC | Age: 48
End: 2018-12-10

## 2018-12-10 DIAGNOSIS — E06.3 HYPOTHYROIDISM DUE TO HASHIMOTO'S THYROIDITIS: Primary | ICD-10-CM

## 2018-12-10 DIAGNOSIS — E03.8 HYPOTHYROIDISM DUE TO HASHIMOTO'S THYROIDITIS: Primary | ICD-10-CM

## 2018-12-10 RX ORDER — LEVOTHYROXINE SODIUM 0.12 MG/1
125 TABLET ORAL DAILY
Qty: 30 TABLET | Refills: 5 | Status: SHIPPED | OUTPATIENT
Start: 2018-12-10 | End: 2019-06-26 | Stop reason: DRUGHIGH

## 2018-12-18 ENCOUNTER — HOSPITAL ENCOUNTER (OUTPATIENT)
Dept: MAMMOGRAPHY | Facility: CLINIC | Age: 48
Discharge: HOME | End: 2018-12-20
Payer: COMMERCIAL

## 2018-12-18 DIAGNOSIS — M81.0 OSTEOPOROSIS, UNSPECIFIED OSTEOPOROSIS TYPE, UNSPECIFIED PATHOLOGICAL FRACTURE PRESENCE: Primary | ICD-10-CM

## 2018-12-18 DIAGNOSIS — M81.0 OSTEOPOROSIS, UNSPECIFIED OSTEOPOROSIS TYPE, UNSPECIFIED PATHOLOGICAL FRACTURE PRESENCE: ICD-10-CM

## 2018-12-18 PROCEDURE — 77080 DXA BONE DENSITY AXIAL: CPT

## 2018-12-18 RX ORDER — IBANDRONATE SODIUM 150 MG/1
150 TABLET, FILM COATED ORAL
Qty: 3 TABLET | Refills: 1 | Status: SHIPPED | OUTPATIENT
Start: 2018-12-18 | End: 2019-12-11 | Stop reason: SDUPTHER

## 2018-12-28 ENCOUNTER — HOSPITAL ENCOUNTER (OUTPATIENT)
Dept: MAMMOGRAPHY | Age: 48
Discharge: HOME | End: 2018-12-30
Payer: COMMERCIAL

## 2018-12-28 DIAGNOSIS — Z12.39 SCREENING FOR BREAST CANCER: ICD-10-CM

## 2018-12-28 PROCEDURE — 77063 BREAST TOMOSYNTHESIS BI: CPT

## 2019-05-21 ENCOUNTER — EMPLOYEE WELLNESS (OUTPATIENT)
Dept: OTHER | Age: 49
End: 2019-05-21

## 2019-05-21 LAB
CHOLESTEROL/HDL RATIO: 3
CHOLESTEROL: 157 MG/DL
GLUCOSE BLD-MCNC: 92 MG/DL (ref 70–99)
HDLC SERPL-MCNC: 53 MG/DL
LDL CHOLESTEROL: 88 MG/DL (ref 0–130)
PATIENT FASTING?: YES
TRIGL SERPL-MCNC: 80 MG/DL
VLDLC SERPL CALC-MCNC: NORMAL MG/DL (ref 1–30)

## 2019-05-28 VITALS — BODY MASS INDEX: 31.89 KG/M2 | WEIGHT: 180 LBS

## 2019-06-26 ENCOUNTER — OFFICE VISIT (OUTPATIENT)
Dept: FAMILY MEDICINE CLINIC | Age: 49
End: 2019-06-26
Payer: COMMERCIAL

## 2019-06-26 VITALS
SYSTOLIC BLOOD PRESSURE: 120 MMHG | HEIGHT: 63 IN | BODY MASS INDEX: 32.96 KG/M2 | RESPIRATION RATE: 18 BRPM | DIASTOLIC BLOOD PRESSURE: 70 MMHG | WEIGHT: 186 LBS | TEMPERATURE: 98.9 F

## 2019-06-26 DIAGNOSIS — E55.9 VITAMIN D INSUFFICIENCY: ICD-10-CM

## 2019-06-26 DIAGNOSIS — H53.9 VISUAL DISTURBANCE: ICD-10-CM

## 2019-06-26 DIAGNOSIS — G47.33 OBSTRUCTIVE SLEEP APNEA SYNDROME: ICD-10-CM

## 2019-06-26 DIAGNOSIS — Z12.12 ENCOUNTER FOR COLORECTAL CANCER SCREENING: ICD-10-CM

## 2019-06-26 DIAGNOSIS — K21.9 GASTROESOPHAGEAL REFLUX DISEASE, ESOPHAGITIS PRESENCE NOT SPECIFIED: ICD-10-CM

## 2019-06-26 DIAGNOSIS — G43.909 MIGRAINE WITHOUT STATUS MIGRAINOSUS, NOT INTRACTABLE, UNSPECIFIED MIGRAINE TYPE: ICD-10-CM

## 2019-06-26 DIAGNOSIS — E03.8 HYPOTHYROIDISM DUE TO HASHIMOTO'S THYROIDITIS: ICD-10-CM

## 2019-06-26 DIAGNOSIS — E06.3 HYPOTHYROIDISM DUE TO HASHIMOTO'S THYROIDITIS: ICD-10-CM

## 2019-06-26 DIAGNOSIS — R19.6 HALITOSIS: ICD-10-CM

## 2019-06-26 DIAGNOSIS — R51.9 NONINTRACTABLE EPISODIC HEADACHE, UNSPECIFIED HEADACHE TYPE: ICD-10-CM

## 2019-06-26 DIAGNOSIS — E66.09 CLASS 1 OBESITY DUE TO EXCESS CALORIES WITHOUT SERIOUS COMORBIDITY WITH BODY MASS INDEX (BMI) OF 32.0 TO 32.9 IN ADULT: Primary | ICD-10-CM

## 2019-06-26 DIAGNOSIS — Z12.11 ENCOUNTER FOR COLORECTAL CANCER SCREENING: ICD-10-CM

## 2019-06-26 PROCEDURE — 99214 OFFICE O/P EST MOD 30 MIN: CPT | Performed by: PEDIATRICS

## 2019-06-26 PROCEDURE — G0444 DEPRESSION SCREEN ANNUAL: HCPCS | Performed by: PEDIATRICS

## 2019-06-26 RX ORDER — PHENTERMINE HYDROCHLORIDE 37.5 MG/1
37.5 TABLET ORAL
Qty: 30 TABLET | Refills: 0 | Status: SHIPPED | OUTPATIENT
Start: 2019-06-26 | End: 2019-07-25 | Stop reason: SDUPTHER

## 2019-06-26 ASSESSMENT — ENCOUNTER SYMPTOMS
FACIAL SWEATING: 0
VISUAL CHANGE: 0
COLOR CHANGE: 0
BLOOD IN STOOL: 0
NAUSEA: 0
BACK PAIN: 0
SORE THROAT: 0
CHEST TIGHTNESS: 0
PHOTOPHOBIA: 0
SCALP TENDERNESS: 0
ABDOMINAL PAIN: 0
SWOLLEN GLANDS: 0
DIARRHEA: 0
SHORTNESS OF BREATH: 0
EYE PAIN: 0
COUGH: 0
EYE REDNESS: 0
SINUS PRESSURE: 0
EYE WATERING: 0
CONSTIPATION: 0
VOMITING: 0
WHEEZING: 0
RHINORRHEA: 0
BLURRED VISION: 1
STRIDOR: 0

## 2019-06-26 ASSESSMENT — PATIENT HEALTH QUESTIONNAIRE - PHQ9
10. IF YOU CHECKED OFF ANY PROBLEMS, HOW DIFFICULT HAVE THESE PROBLEMS MADE IT FOR YOU TO DO YOUR WORK, TAKE CARE OF THINGS AT HOME, OR GET ALONG WITH OTHER PEOPLE: 0
5. POOR APPETITE OR OVEREATING: 0
4. FEELING TIRED OR HAVING LITTLE ENERGY: 1
3. TROUBLE FALLING OR STAYING ASLEEP: 0
2. FEELING DOWN, DEPRESSED OR HOPELESS: 1
7. TROUBLE CONCENTRATING ON THINGS, SUCH AS READING THE NEWSPAPER OR WATCHING TELEVISION: 0
6. FEELING BAD ABOUT YOURSELF - OR THAT YOU ARE A FAILURE OR HAVE LET YOURSELF OR YOUR FAMILY DOWN: 1
8. MOVING OR SPEAKING SO SLOWLY THAT OTHER PEOPLE COULD HAVE NOTICED. OR THE OPPOSITE, BEING SO FIGETY OR RESTLESS THAT YOU HAVE BEEN MOVING AROUND A LOT MORE THAN USUAL: 0
SUM OF ALL RESPONSES TO PHQ QUESTIONS 1-9: 4
1. LITTLE INTEREST OR PLEASURE IN DOING THINGS: 1
SUM OF ALL RESPONSES TO PHQ9 QUESTIONS 1 & 2: 2
SUM OF ALL RESPONSES TO PHQ QUESTIONS 1-9: 4
9. THOUGHTS THAT YOU WOULD BE BETTER OFF DEAD, OR OF HURTING YOURSELF: 0

## 2019-06-26 NOTE — PROGRESS NOTES
Subjective:      Patient ID: Omid Singh is a 44 y.o. female. Visit Information    Have you changed or started any medications since your last visit including any over-the-counter medicines, vitamins, or herbal medicines? no   Are you having any side effects from any of your medications? -  no  Have you stopped taking any of your medications? Is so, why? -  no    Have you seen any other physician or provider since your last visit? No  Have you had any other diagnostic tests since your last visit? No  Have you been seen in the emergency room and/or had an admission to a hospital since we last saw you? No  Have you activated your [employer] account? If not, what are your barriers? Yes     Patient Care Team:  Tyrone Alegria MD as PCP - General (Internal Medicine)  Tyrone Alegria MD as PCP - [facility] Provider    Medical History Review  Past Medical, Family, and Social History reviewed and does contribute to the patient presenting condition    Health Maintenance   Topic Date Due    Shingles Vaccine (1 of 2) 06/01/2018    Colon cancer screen colonoscopy  06/01/2018    Cervical cancer screen  06/22/2018    TSH testing  06/27/2020    Breast cancer screen  12/28/2020    Lipid screen  05/21/2024    DTaP/Tdap/Td vaccine (2 - Td) 09/27/2027    Flu vaccine  Completed    Pneumococcal 0-64 years Vaccine  Completed    HIV screen  Completed       PHQ Scores 6/26/2019 12/5/2018 9/27/2017 2/22/2016 6/17/2015   PHQ2 Score 2 0 0 0 0   PHQ9 Score 4 0 0 0 0     Interpretation of Total Score DepressionSeverity: 1-4 = Minimal depression, 5-9 = Mild depression, 10-14 = Moderate depression, 15-19 = Moderately severe depression, 20-27 = Severe depression    Current Outpatient Medications   Medication Sig Dispense Refill    phentermine (ADIPEX-P) 37.5 MG tablet Take 1 tablet by mouth every morning (before breakfast) for 30 days.  30 tablet 0    ibandronate (BONIVA) 150 MG tablet Take 1 tablet by mouth every 30 days 3 tablet 1    levothyroxine (SYNTHROID) 137 MCG tablet Take 1 tablet by mouth Daily 90 tablet 3    montelukast (SINGULAIR) 10 MG tablet Take 1 tablet by mouth nightly 90 tablet 3    albuterol sulfate HFA (PROAIR HFA) 108 (90 BASE) MCG/ACT inhaler Inhale 2 puffs into the lungs every 6 hours as needed for Wheezing 3 Inhaler 3     No current facility-administered medications for this visit. HPI:      Patient presents today for evaluation of several things. She would like to discuss something to help her lose weight. She does inquire about adipex. She has been trying to follow a healthy diet and exercises occasionally. She does have a history of obstructive sleep apnea that tis not treated with CPAP. It is recommended that she lose weight to help improve this diagnosis. She does have a history of migraine headaches and she does complain of headaches that are located at the back of her head. These usually occur 1-2 times per month. She will get a weird feeling in the back of her head when she bends over. She states that she will think we'll go black for a few seconds and resolve. She states she loses her train of thought intermittently. She does worry about dementia. She also complains of persistent halitosis. She does have a history of GERD and has not had an EGD for evaluation of anything anatomically could be causing her symptoms such as a Zenker's diverticulum. She is also due for colorectal cancer screening test.  She does have a history of vitamin D deficiency and hypothyroidism and is due for labs. cmw        Headache    This is a new problem. The current episode started 1 to 4 weeks ago. The problem occurs intermittently. The problem has been waxing and waning. The pain is located in the occipital region. The pain does not radiate. The pain quality is not similar to prior headaches. The quality of the pain is described as aching and throbbing.  The pain is at a severity of 6/10. The pain is mild. Associated symptoms include blurred vision. Pertinent negatives include no abdominal pain, abnormal behavior, anorexia, back pain, coughing, dizziness, drainage, ear pain, eye pain, eye redness, eye watering, facial sweating, fever, hearing loss, insomnia, loss of balance, muscle aches, nausea, neck pain, numbness, phonophobia, photophobia, rhinorrhea, scalp tenderness, seizures, sinus pressure, sore throat, swollen glands, tingling, tinnitus, visual change, vomiting, weakness or weight loss. Nothing aggravates the symptoms. She has tried acetaminophen for the symptoms. The treatment provided mild relief. Review of Systems   Constitutional: Positive for unexpected weight change (has gained weight ). Negative for appetite change, fatigue, fever and weight loss. HENT: Negative for congestion, ear pain, hearing loss, rhinorrhea, sinus pressure, sore throat and tinnitus. Eyes: Positive for blurred vision and visual disturbance. Negative for photophobia, pain and redness. Respiratory: Negative for cough, chest tightness, shortness of breath, wheezing and stridor. Cardiovascular: Negative for chest pain, palpitations and leg swelling. Gastrointestinal: Negative for abdominal pain, anorexia, blood in stool, constipation, diarrhea, nausea and vomiting. Genitourinary: Negative for decreased urine volume, dysuria, hematuria and urgency. Musculoskeletal: Negative for back pain and neck pain. Skin: Negative for color change and rash. Neurological: Positive for syncope (near syncope) and headaches. Negative for dizziness, tingling, seizures, weakness, numbness and loss of balance. Hematological: Negative for adenopathy. Does not bruise/bleed easily. Psychiatric/Behavioral: Negative for dysphoric mood and sleep disturbance. The patient is not nervous/anxious and does not have insomnia.         Objective:     /70 (Site: Right Upper Arm, Position: Sitting, Cuff Size: Medium Adult)   Temp 98.9 °F (37.2 °C) (Tympanic)   Resp 18   Ht 5' 3\" (1.6 m)   Wt 186 lb (84.4 kg)   Breastfeeding? No   BMI 32.95 kg/m²        Physical Exam   Constitutional: She is oriented to person, place, and time. She appears well-developed and well-nourished. obese   HENT:   Head: Normocephalic. Right Ear: External ear normal.   Left Ear: External ear normal.   Nose: Nose normal.   Mouth/Throat: Oropharynx is clear and moist. No oropharyngeal exudate. I am not able to appreciate any abnormal smell to the patient's breath. Eyes: Pupils are equal, round, and reactive to light. Conjunctivae, EOM and lids are normal. Right eye exhibits no discharge. Left eye exhibits no discharge. No scleral icterus. Neck: Trachea normal and normal range of motion. Neck supple. Carotid bruit is not present. Cardiovascular: Normal rate, regular rhythm and normal heart sounds. Exam reveals no distant heart sounds. No murmur heard. Pulses:       Carotid pulses are 2+ on the right side, and 2+ on the left side. Radial pulses are 2+ on the right side, and 2+ on the left side. Femoral pulses are 2+ on the right side, and 2+ on the left side. Pulmonary/Chest: Effort normal and breath sounds normal. No stridor. She has no wheezes. She has no rales. Abdominal: Soft. Normal appearance and bowel sounds are normal. There is no tenderness. There is no rebound, no guarding and no CVA tenderness. Musculoskeletal: Normal range of motion. She exhibits no deformity. No red or swollen joints. Lymphadenopathy:     She has no cervical adenopathy. Neurological: She is alert and oriented to person, place, and time. She has normal strength and normal reflexes. No cranial nerve deficit. Coordination normal.   Skin: Skin is warm. Capillary refill takes less than 2 seconds. No rash noted. Psychiatric: She has a normal mood and affect.  Her speech is normal and behavior is normal. Judgment normal. Cognition and memory are normal.   Nursing note and vitals reviewed. Assessment:      Diagnosis Orders   1. Class 1 obesity due to excess calories without serious comorbidity with body mass index (BMI) of 32.0 to 32.9 in adult  Comprehensive Metabolic Panel    phentermine (ADIPEX-P) 37.5 MG tablet   2. Obstructive sleep apnea syndrome     3. Migraine without status migrainosus, not intractable, unspecified migraine type  MRI Brain W WO Contrast   4. Nonintractable episodic headache, unspecified headache type  MRI Brain W WO Contrast   5. Visual disturbance  MRI Brain W WO Contrast   6. Maye Espinoza MD, [facility], [place]   7. Gastroesophageal reflux disease, esophagitis presence not specified  Mukesh Steven MD, [facility]astroenter[facility], [place]    Comprehensive Metabolic Panel   8. Encounter for colorectal cancer screening  Mukesh Steven MD, Gastroenter[facility], [place]   9. Vitamin D insufficiency  Vitamin D 25 Hydroxy   10. Hypothyroidism due to Hashimoto's thyroiditis  TSH without Reflex    T4, Free       Plan:     Recommend adipex as prescribed   Proceed with labs as ordered   Recommend daily exercise and healthy diet  Advise weight reduction   Recommend obtain MRI of brain   Recommend referral to Dr. Roc Escobar for evaluation of halitosis, GERD and colorectal cancer screening   Call with concerns         Andrzej Almonte received counseling on the following healthy behaviors: nutrition, exercise and medication adherence  Reviewed prior labs and health maintenance. Continue current medications, diet and exercise. Discussed use, benefit, and side effects of prescribed medications. Barriers to medication compliance addressed. Patient given educational materials - see patient instructions. All patient questions answered. Patient voiced understanding.        Electronically signed by Donnie Bence, MD on 6/30/2019 at 11:13 PM

## 2019-06-27 ENCOUNTER — HOSPITAL ENCOUNTER (OUTPATIENT)
Age: 49
Setting detail: SPECIMEN
Discharge: HOME | End: 2019-06-27
Payer: COMMERCIAL

## 2019-06-27 DIAGNOSIS — E03.8 HYPOTHYROIDISM DUE TO HASHIMOTO'S THYROIDITIS: ICD-10-CM

## 2019-06-27 DIAGNOSIS — E66.09 CLASS 1 OBESITY DUE TO EXCESS CALORIES WITHOUT SERIOUS COMORBIDITY WITH BODY MASS INDEX (BMI) OF 32.0 TO 32.9 IN ADULT: ICD-10-CM

## 2019-06-27 DIAGNOSIS — E06.3 HYPOTHYROIDISM DUE TO HASHIMOTO'S THYROIDITIS: ICD-10-CM

## 2019-06-27 DIAGNOSIS — K21.9 GASTROESOPHAGEAL REFLUX DISEASE, ESOPHAGITIS PRESENCE NOT SPECIFIED: ICD-10-CM

## 2019-06-27 DIAGNOSIS — E55.9 VITAMIN D INSUFFICIENCY: ICD-10-CM

## 2019-06-27 LAB
ALBUMIN SERPL-MCNC: 3.7 G/DL (ref 3.5–5.2)
ALBUMIN/GLOBULIN RATIO: 1 (ref 1–2.5)
ALP BLD-CCNC: 86 U/L (ref 35–104)
ALT SERPL-CCNC: 19 U/L (ref 5–33)
ANION GAP SERPL CALCULATED.3IONS-SCNC: 10 MMOL/L (ref 9–17)
AST SERPL-CCNC: 19 U/L
BILIRUB SERPL-MCNC: 0.67 MG/DL (ref 0.3–1.2)
BUN BLDV-MCNC: 5 MG/DL (ref 6–20)
BUN/CREAT BLD: ABNORMAL (ref 9–20)
CALCIUM SERPL-MCNC: 9.9 MG/DL (ref 8.6–10.4)
CHLORIDE BLD-SCNC: 104 MMOL/L (ref 98–107)
CO2: 23 MMOL/L (ref 20–31)
CREAT SERPL-MCNC: 0.57 MG/DL (ref 0.5–0.9)
GFR AFRICAN AMERICAN: >60 ML/MIN
GFR NON-AFRICAN AMERICAN: >60 ML/MIN
GFR SERPL CREATININE-BSD FRML MDRD: ABNORMAL ML/MIN/{1.73_M2}
GFR SERPL CREATININE-BSD FRML MDRD: ABNORMAL ML/MIN/{1.73_M2}
GLUCOSE BLD-MCNC: 78 MG/DL (ref 70–99)
POTASSIUM SERPL-SCNC: 4.5 MMOL/L (ref 3.7–5.3)
SODIUM BLD-SCNC: 137 MMOL/L (ref 135–144)
THYROXINE, FREE: 1.65 NG/DL (ref 0.93–1.7)
TOTAL PROTEIN: 7.4 G/DL (ref 6.4–8.3)
TSH SERPL DL<=0.05 MIU/L-ACNC: 0.16 MIU/L (ref 0.3–5)
VITAMIN D 25-HYDROXY: 26.8 NG/ML (ref 30–100)

## 2019-06-28 ENCOUNTER — PATIENT MESSAGE (OUTPATIENT)
Dept: FAMILY MEDICINE CLINIC | Age: 49
End: 2019-06-28

## 2019-06-28 ENCOUNTER — TELEPHONE (OUTPATIENT)
Dept: GASTROENTEROLOGY | Age: 49
End: 2019-06-28

## 2019-06-28 DIAGNOSIS — E06.3 HYPOTHYROIDISM DUE TO HASHIMOTO'S THYROIDITIS: Primary | ICD-10-CM

## 2019-06-28 DIAGNOSIS — E03.8 HYPOTHYROIDISM DUE TO HASHIMOTO'S THYROIDITIS: Primary | ICD-10-CM

## 2019-06-28 NOTE — TELEPHONE ENCOUNTER
From: Neil Benitez  To: Donnie Bence, MD  Sent: 6/28/2019 9:39 AM EDT  Subject: Test Results Question    Hi ,  I gave wrong information to the MA when I said that I was on 137mcg. I am actually on 125mcg. Should I stay there and get another refill of it as I am due for one, or should I go down. If you are going to send me a different dose, please send it to [facility] [address].   Thanks you very much,  Andrzej MCFARLAND    PS: I need 2 appointments one month apart.

## 2019-07-05 ENCOUNTER — HOSPITAL ENCOUNTER (OUTPATIENT)
Dept: MRI IMAGING | Facility: CLINIC | Age: 49
Discharge: HOME | End: 2019-07-07
Payer: COMMERCIAL

## 2019-07-05 DIAGNOSIS — G43.909 MIGRAINE WITHOUT STATUS MIGRAINOSUS, NOT INTRACTABLE, UNSPECIFIED MIGRAINE TYPE: ICD-10-CM

## 2019-07-05 DIAGNOSIS — H53.9 VISUAL DISTURBANCE: ICD-10-CM

## 2019-07-05 DIAGNOSIS — R51.9 NONINTRACTABLE EPISODIC HEADACHE, UNSPECIFIED HEADACHE TYPE: ICD-10-CM

## 2019-07-05 PROCEDURE — A9579 GAD-BASE MR CONTRAST NOS,1ML: HCPCS | Performed by: PEDIATRICS

## 2019-07-05 PROCEDURE — 6360000004 HC RX CONTRAST MEDICATION: Performed by: PEDIATRICS

## 2019-07-05 PROCEDURE — 70553 MRI BRAIN STEM W/O & W/DYE: CPT

## 2019-07-05 RX ADMIN — GADOTERIDOL 20 ML: 279.3 INJECTION, SOLUTION INTRAVENOUS at 15:06

## 2019-07-08 ENCOUNTER — OFFICE VISIT (OUTPATIENT)
Dept: OBGYN CLINIC | Age: 49
End: 2019-07-08
Payer: COMMERCIAL

## 2019-07-08 VITALS
BODY MASS INDEX: 31.89 KG/M2 | WEIGHT: 180 LBS | DIASTOLIC BLOOD PRESSURE: 80 MMHG | HEIGHT: 63 IN | SYSTOLIC BLOOD PRESSURE: 121 MMHG

## 2019-07-08 DIAGNOSIS — R10.2 PELVIC PRESSURE IN FEMALE: ICD-10-CM

## 2019-07-08 DIAGNOSIS — Z01.419 WELL WOMAN EXAM WITH ROUTINE GYNECOLOGICAL EXAM: Primary | ICD-10-CM

## 2019-07-08 DIAGNOSIS — R33.9 URINARY RETENTION: ICD-10-CM

## 2019-07-08 DIAGNOSIS — D25.9 UTERINE LEIOMYOMA, UNSPECIFIED LOCATION: ICD-10-CM

## 2019-07-08 DIAGNOSIS — Z98.891 HISTORY OF CESAREAN SECTION: ICD-10-CM

## 2019-07-08 PROCEDURE — 99386 PREV VISIT NEW AGE 40-64: CPT | Performed by: OBSTETRICS & GYNECOLOGY

## 2019-07-08 NOTE — PROGRESS NOTES
bruising  Immunologic/Lymphatic: negative recent illness, negative recent sick contact  Musculoskeletal: negative back pain, negative myalgias, negative arthralgias  Neurological:  negative dizziness, negative weakness  Behavior/Psych: negative depression, negative anxiety      GYNECOLOGICAL HISTORY:     Age of Menopause: 46     Sexually Active: not sexually active  STD History: no past history    Pap History: Last PAP was normal with neg HRHPV; 2015. Colposcopy History: denies    Permanent Sterilization: no  Reversible Birth Control: no  Hormone Replacement Exposure: no    OBSTETRICAL HISTORY:  OB History    Para Term  AB Living   2 2 2 0 0 2   SAB TAB Ectopic Molar Multiple Live Births   0 0 0 0 0 0      # Outcome Date GA Lbr Lyle/2nd Weight Sex Delivery Anes PTL Lv   2 Term      Vag-Spont      1 Term      CS-Unspec          PAST MEDICAL HISTORY:   has a past medical history of Allergic rhinitis, Asthma, Excessive bleeding, GERD (gastroesophageal reflux disease), Hypothyroidism, SACHA (obstructive sleep apnea), Urinary retention, and Uterine fibroid. PAST SURGICAL HISTORY:   has a past surgical history that includes  section. ALLERGIES:  has No Known Allergies. MEDICATIONS:  Prior to Admission medications    Medication Sig Start Date End Date Taking? Authorizing Provider   phentermine (ADIPEX-P) 37.5 MG tablet Take 1 tablet by mouth every morning (before breakfast) for 30 days.  19 Yes Fabio Alvarenga MD   montelukast (SINGULAIR) 10 MG tablet Take 1 tablet by mouth nightly 18 Yes Fabio Alvarenga MD   levothyroxine (SYNTHROID) 137 MCG tablet Take 1 tablet by mouth Daily 18  Yes Leona Marie MD   ibandronate (BONIVA) 150 MG tablet Take 1 tablet by mouth every 30 days 18  ABRAHAM Tan - CNP   albuterol sulfate HFA (PROAIR HFA) 108 (90 BASE) MCG/ACT inhaler Inhale 2 puffs into the lungs every 6 hours as needed

## 2019-07-10 ENCOUNTER — HOSPITAL ENCOUNTER (OUTPATIENT)
Dept: ULTRASOUND IMAGING | Age: 49
Discharge: HOME | End: 2019-07-12
Payer: COMMERCIAL

## 2019-07-10 DIAGNOSIS — D25.9 UTERINE LEIOMYOMA, UNSPECIFIED LOCATION: ICD-10-CM

## 2019-07-10 PROCEDURE — 76856 US EXAM PELVIC COMPLETE: CPT

## 2019-07-10 PROCEDURE — 76830 TRANSVAGINAL US NON-OB: CPT

## 2019-07-11 ENCOUNTER — OFFICE VISIT (OUTPATIENT)
Dept: GASTROENTEROLOGY | Age: 49
End: 2019-07-11
Payer: COMMERCIAL

## 2019-07-11 VITALS
WEIGHT: 179.5 LBS | SYSTOLIC BLOOD PRESSURE: 142 MMHG | HEART RATE: 68 BPM | DIASTOLIC BLOOD PRESSURE: 99 MMHG | BODY MASS INDEX: 31.8 KG/M2

## 2019-07-11 DIAGNOSIS — K58.8 OTHER IRRITABLE BOWEL SYNDROME: ICD-10-CM

## 2019-07-11 DIAGNOSIS — R19.6 HALITOSIS: ICD-10-CM

## 2019-07-11 DIAGNOSIS — Z12.11 SCREEN FOR COLON CANCER: ICD-10-CM

## 2019-07-11 DIAGNOSIS — K21.9 GASTROESOPHAGEAL REFLUX DISEASE, ESOPHAGITIS PRESENCE NOT SPECIFIED: Primary | ICD-10-CM

## 2019-07-11 PROCEDURE — 99244 OFF/OP CNSLTJ NEW/EST MOD 40: CPT | Performed by: INTERNAL MEDICINE

## 2019-07-11 ASSESSMENT — ENCOUNTER SYMPTOMS
RESPIRATORY NEGATIVE: 1
VOMITING: 0
COUGH: 0
SORE THROAT: 1
BACK PAIN: 1
WHEEZING: 0
EYES NEGATIVE: 1
VOICE CHANGE: 0
ABDOMINAL DISTENTION: 0
CONSTIPATION: 0
RECTAL PAIN: 0
SINUS PRESSURE: 1
DIARRHEA: 1
ABDOMINAL PAIN: 1
NAUSEA: 0
ALLERGIC/IMMUNOLOGIC NEGATIVE: 1
ANAL BLEEDING: 0
BLOOD IN STOOL: 0
CHOKING: 0
TROUBLE SWALLOWING: 1

## 2019-07-15 RX ORDER — SODIUM, POTASSIUM,MAG SULFATES 17.5-3.13G
SOLUTION, RECONSTITUTED, ORAL ORAL
Qty: 1 BOTTLE | Refills: 0 | Status: ON HOLD | OUTPATIENT
Start: 2019-07-15 | End: 2019-08-01

## 2019-07-22 ENCOUNTER — TELEPHONE (OUTPATIENT)
Dept: GASTROENTEROLOGY | Age: 49
End: 2019-07-22

## 2019-07-25 ENCOUNTER — TELEPHONE (OUTPATIENT)
Dept: GASTROENTEROLOGY | Age: 49
End: 2019-07-25

## 2019-07-25 ENCOUNTER — OFFICE VISIT (OUTPATIENT)
Dept: FAMILY MEDICINE CLINIC | Age: 49
End: 2019-07-25
Payer: COMMERCIAL

## 2019-07-25 VITALS
DIASTOLIC BLOOD PRESSURE: 78 MMHG | BODY MASS INDEX: 30.65 KG/M2 | HEIGHT: 63 IN | TEMPERATURE: 97.9 F | SYSTOLIC BLOOD PRESSURE: 120 MMHG | RESPIRATION RATE: 20 BRPM | WEIGHT: 173 LBS

## 2019-07-25 DIAGNOSIS — G47.33 OBSTRUCTIVE SLEEP APNEA SYNDROME: ICD-10-CM

## 2019-07-25 DIAGNOSIS — J01.40 ACUTE NON-RECURRENT PANSINUSITIS: ICD-10-CM

## 2019-07-25 DIAGNOSIS — E66.09 CLASS 1 OBESITY DUE TO EXCESS CALORIES WITHOUT SERIOUS COMORBIDITY WITH BODY MASS INDEX (BMI) OF 32.0 TO 32.9 IN ADULT: Primary | ICD-10-CM

## 2019-07-25 DIAGNOSIS — R05.9 COUGH: ICD-10-CM

## 2019-07-25 PROCEDURE — 99214 OFFICE O/P EST MOD 30 MIN: CPT | Performed by: PEDIATRICS

## 2019-07-25 RX ORDER — AZITHROMYCIN 250 MG/1
TABLET, FILM COATED ORAL
Qty: 6 TABLET | Refills: 0 | Status: SHIPPED | OUTPATIENT
Start: 2019-07-25 | End: 2019-08-04

## 2019-07-25 RX ORDER — PHENTERMINE HYDROCHLORIDE 37.5 MG/1
37.5 TABLET ORAL
Qty: 30 TABLET | Refills: 0 | Status: SHIPPED | OUTPATIENT
Start: 2019-07-25 | End: 2019-08-21 | Stop reason: SDUPTHER

## 2019-07-25 ASSESSMENT — ENCOUNTER SYMPTOMS
NAUSEA: 0
CHEST TIGHTNESS: 0
VOMITING: 0
DIARRHEA: 0
STRIDOR: 0
SHORTNESS OF BREATH: 0
CONSTIPATION: 0
COLOR CHANGE: 0
BLOOD IN STOOL: 0
SORE THROAT: 0
ABDOMINAL PAIN: 0
EYE PAIN: 0
BACK PAIN: 0
COUGH: 0
WHEEZING: 0
EYE REDNESS: 0
PHOTOPHOBIA: 0

## 2019-07-25 NOTE — PROGRESS NOTES
(before breakfast) for 30 days. 30 tablet 0    azithromycin (ZITHROMAX) 250 MG tablet 2 tablets by mouth day one, then 1 tablet daily by mouth for 4 more days 6 tablet 0    ibandronate (BONIVA) 150 MG tablet Take 1 tablet by mouth every 30 days 3 tablet 1    montelukast (SINGULAIR) 10 MG tablet Take 1 tablet by mouth nightly 90 tablet 3    levothyroxine (SYNTHROID) 137 MCG tablet Take 1 tablet by mouth Daily 90 tablet 3    albuterol sulfate HFA (PROAIR HFA) 108 (90 BASE) MCG/ACT inhaler Inhale 2 puffs into the lungs every 6 hours as needed for Wheezing 3 Inhaler 3    Multiple Vitamins-Minerals (THERAPEUTIC MULTIVITAMIN-MINERALS) tablet Take 1 tablet by mouth daily       No current facility-administered medications for this visit. Patient presents to office for her adipex check. She is exercising everyday and watching her diet. HPI    Patient presents today for routine follow-up of obesity and weight management. She was started on Adipex 1 month ago and has lost 13 pounds since starting the medication. She does complain of a dry mouth from the medication but denies any other side effects. Her blood pressure remains normal.  She does have a comorbid condition of obstructive sleep apnea and continued weight reduction is ideal.  She does complain of sinus symptoms after having a fever and cold symptoms including nasal congestion and runny nose and cough 2 weeks ago. She does think she has a sinus infection. She has been using nasal spray and nasal rinse as instructed by her ENT. She's also been taking a multivitamin, vitamin D and omega-3 fatty acids in her sense of smell has improved. She is following up with GI for her EGD and colonoscopy next week. She has been taking align, Metamucil and Pepcid. cmw      Review of Systems   Constitutional: Positive for unexpected weight change (she has lost weight since starting adipex - 13 lbs). Negative for appetite change, fatigue and fever.    HENT: Positive for congestion, postnasal drip, rhinorrhea and sinus pressure. Negative for ear pain, hearing loss, sore throat, tinnitus and voice change. Eyes: Negative for photophobia, pain, redness and visual disturbance. Respiratory: Negative for cough, chest tightness, shortness of breath, wheezing and stridor. Cardiovascular: Negative for chest pain, palpitations and leg swelling. Gastrointestinal: Negative for abdominal pain, blood in stool, constipation, diarrhea, nausea and vomiting. Genitourinary: Negative for decreased urine volume, dysuria, hematuria and urgency. Musculoskeletal: Negative for back pain and neck pain. Skin: Negative for color change and rash. Neurological: Positive for headaches. Negative for dizziness, seizures, syncope, weakness and numbness. Hematological: Negative for adenopathy. Does not bruise/bleed easily. Psychiatric/Behavioral: Negative for dysphoric mood and sleep disturbance. The patient is not nervous/anxious. Objective:     /78 (Site: Right Upper Arm, Position: Sitting, Cuff Size: Medium Adult)   Temp 97.9 °F (36.6 °C) (Tympanic)   Resp 20   Ht 5' 3\" (1.6 m)   Wt 173 lb (78.5 kg)   LMP 04/23/2018   Breastfeeding? No   BMI 30.65 kg/m²        Physical Exam   Constitutional: She is oriented to person, place, and time. She appears well-developed and well-nourished. obese   HENT:   Head: Normocephalic. Right Ear: External ear normal.   Left Ear: External ear normal.   Nose: Mucosal edema present. Right sinus exhibits maxillary sinus tenderness and frontal sinus tenderness. Left sinus exhibits maxillary sinus tenderness and frontal sinus tenderness. Mouth/Throat: Posterior oropharyngeal erythema (with PND) present. No oropharyngeal exudate. Eyes: Pupils are equal, round, and reactive to light. Conjunctivae, EOM and lids are normal. Right eye exhibits no discharge. Left eye exhibits no discharge. No scleral icterus.    Neck: Trachea normal medication adherence  Reviewed prior labs and health maintenance. Continue current medications, diet and exercise. Discussed use, benefit, and side effects of prescribed medications. Barriers to medication compliance addressed. Patient given educational materials - see patient instructions. All patient questions answered. Patient voiced understanding.            Electronically signed by Minerva Paula MD on 8/4/2019 at 12:24 AM

## 2019-07-25 NOTE — TELEPHONE ENCOUNTER
Spoke with patient to confirm procedure scheduled for Thursday August 1, 2019 @ [address] [facility] @ 10:00am w.arrival at 8:30am.  Patient has her prep and has no questions, and has a  .

## 2019-07-31 ENCOUNTER — TELEPHONE (OUTPATIENT)
Dept: GASTROENTEROLOGY | Age: 49
End: 2019-07-31

## 2019-07-31 NOTE — TELEPHONE ENCOUNTER
Patient called to ask what directions she needs for EGD scheduled tomorrow. Patient states that she has cancelled colonoscopy. Writer explained that colonoscopy has not been cancelled with the office, we were not aware that she wished to cancel. Patient informed pre-registration that she wished to cancel due to the cost they asked for. Writer explained to patient that the amount is not due upfront as she thought, however, patient still wishes to cancel colon and proceed with EGD only tomorrow since she has no family h/o of colon cancer. She may r/s colon at a later date. Colon has been cancelled. Onelia-sending to you as FYI so that tomorrows procedure schedule and charts can be fixed.

## 2019-08-01 ENCOUNTER — ANESTHESIA EVENT (OUTPATIENT)
Dept: ENDOSCOPY | Age: 49
End: 2019-08-01
Payer: COMMERCIAL

## 2019-08-01 ENCOUNTER — ANESTHESIA (OUTPATIENT)
Dept: ENDOSCOPY | Age: 49
End: 2019-08-01
Payer: COMMERCIAL

## 2019-08-01 ENCOUNTER — HOSPITAL ENCOUNTER (OUTPATIENT)
Age: 49
Setting detail: OUTPATIENT SURGERY
Discharge: HOME | End: 2019-08-01
Attending: INTERNAL MEDICINE | Admitting: INTERNAL MEDICINE
Payer: COMMERCIAL

## 2019-08-01 VITALS
DIASTOLIC BLOOD PRESSURE: 85 MMHG | SYSTOLIC BLOOD PRESSURE: 134 MMHG | WEIGHT: 173 LBS | RESPIRATION RATE: 14 BRPM | HEIGHT: 63 IN | OXYGEN SATURATION: 100 % | BODY MASS INDEX: 30.65 KG/M2 | HEART RATE: 67 BPM | TEMPERATURE: 97.6 F

## 2019-08-01 VITALS — OXYGEN SATURATION: 86 % | DIASTOLIC BLOOD PRESSURE: 84 MMHG | SYSTOLIC BLOOD PRESSURE: 133 MMHG

## 2019-08-01 LAB
-: NORMAL
HCG, PREGNANCY URINE (POC): NEGATIVE

## 2019-08-01 PROCEDURE — 88305 TISSUE EXAM BY PATHOLOGIST: CPT

## 2019-08-01 PROCEDURE — 2580000003 HC RX 258: Performed by: ANESTHESIOLOGY

## 2019-08-01 PROCEDURE — 7100000000 HC PACU RECOVERY - FIRST 15 MIN: Performed by: INTERNAL MEDICINE

## 2019-08-01 PROCEDURE — 6360000002 HC RX W HCPCS: Performed by: NURSE ANESTHETIST, CERTIFIED REGISTERED

## 2019-08-01 PROCEDURE — 7100000031 HC ASPR PHASE II RECOVERY - ADDTL 15 MIN: Performed by: INTERNAL MEDICINE

## 2019-08-01 PROCEDURE — 2500000003 HC RX 250 WO HCPCS: Performed by: NURSE ANESTHETIST, CERTIFIED REGISTERED

## 2019-08-01 PROCEDURE — 2709999900 HC NON-CHARGEABLE SUPPLY: Performed by: INTERNAL MEDICINE

## 2019-08-01 PROCEDURE — 3700000000 HC ANESTHESIA ATTENDED CARE: Performed by: INTERNAL MEDICINE

## 2019-08-01 PROCEDURE — 43239 EGD BIOPSY SINGLE/MULTIPLE: CPT | Performed by: INTERNAL MEDICINE

## 2019-08-01 PROCEDURE — 7100000011 HC PHASE II RECOVERY - ADDTL 15 MIN: Performed by: INTERNAL MEDICINE

## 2019-08-01 PROCEDURE — 7100000030 HC ASPR PHASE II RECOVERY - FIRST 15 MIN: Performed by: INTERNAL MEDICINE

## 2019-08-01 PROCEDURE — 81025 URINE PREGNANCY TEST: CPT

## 2019-08-01 PROCEDURE — 7100000010 HC PHASE II RECOVERY - FIRST 15 MIN: Performed by: INTERNAL MEDICINE

## 2019-08-01 PROCEDURE — 3700000001 HC ADD 15 MINUTES (ANESTHESIA): Performed by: INTERNAL MEDICINE

## 2019-08-01 PROCEDURE — 7100000001 HC PACU RECOVERY - ADDTL 15 MIN: Performed by: INTERNAL MEDICINE

## 2019-08-01 PROCEDURE — 3609012400 HC EGD TRANSORAL BIOPSY SINGLE/MULTIPLE: Performed by: INTERNAL MEDICINE

## 2019-08-01 PROCEDURE — 88342 IMHCHEM/IMCYTCHM 1ST ANTB: CPT

## 2019-08-01 RX ORDER — SODIUM CHLORIDE, SODIUM LACTATE, POTASSIUM CHLORIDE, CALCIUM CHLORIDE 600; 310; 30; 20 MG/100ML; MG/100ML; MG/100ML; MG/100ML
INJECTION, SOLUTION INTRAVENOUS CONTINUOUS
Status: DISCONTINUED | OUTPATIENT
Start: 2019-08-01 | End: 2019-08-01 | Stop reason: HOSPADM

## 2019-08-01 RX ORDER — M-VIT,TX,IRON,MINS/CALC/FOLIC 27MG-0.4MG
1 TABLET ORAL DAILY
COMMUNITY
End: 2020-01-23 | Stop reason: ALTCHOICE

## 2019-08-01 RX ORDER — LIDOCAINE HYDROCHLORIDE 10 MG/ML
INJECTION, SOLUTION EPIDURAL; INFILTRATION; INTRACAUDAL; PERINEURAL PRN
Status: DISCONTINUED | OUTPATIENT
Start: 2019-08-01 | End: 2019-08-01 | Stop reason: SDUPTHER

## 2019-08-01 RX ORDER — PROPOFOL 10 MG/ML
INJECTION, EMULSION INTRAVENOUS PRN
Status: DISCONTINUED | OUTPATIENT
Start: 2019-08-01 | End: 2019-08-01 | Stop reason: SDUPTHER

## 2019-08-01 RX ADMIN — LIDOCAINE HYDROCHLORIDE 50 MG: 10 INJECTION, SOLUTION EPIDURAL; INFILTRATION; INTRACAUDAL; PERINEURAL at 10:34

## 2019-08-01 RX ADMIN — PROPOFOL 50 MG: 10 INJECTION, EMULSION INTRAVENOUS at 10:35

## 2019-08-01 RX ADMIN — PROPOFOL 120 MG: 10 INJECTION, EMULSION INTRAVENOUS at 10:33

## 2019-08-01 RX ADMIN — SODIUM CHLORIDE, POTASSIUM CHLORIDE, SODIUM LACTATE AND CALCIUM CHLORIDE: 600; 310; 30; 20 INJECTION, SOLUTION INTRAVENOUS at 10:30

## 2019-08-01 RX ADMIN — SODIUM CHLORIDE, POTASSIUM CHLORIDE, SODIUM LACTATE AND CALCIUM CHLORIDE: 600; 310; 30; 20 INJECTION, SOLUTION INTRAVENOUS at 09:37

## 2019-08-01 RX ADMIN — PROPOFOL 30 MG: 10 INJECTION, EMULSION INTRAVENOUS at 10:34

## 2019-08-01 ASSESSMENT — PULMONARY FUNCTION TESTS
PIF_VALUE: 0

## 2019-08-01 ASSESSMENT — PAIN SCALES - GENERAL
PAINLEVEL_OUTOF10: 0

## 2019-08-01 NOTE — ANESTHESIA PRE PROCEDURE
Hashimoto's thyroiditis E03.8, E06.3    History of  section Z98.891    Fibroid uterus D25.9    Pelvic pressure in female R10.2    Urinary retention R33.9    Halitosis R19.6    Screen for colon cancer Z12.11       Past Medical History:        Diagnosis Date    Allergic rhinitis     Asthma     Excessive bleeding     GERD (gastroesophageal reflux disease)     Halitosis     Hypothyroidism     SACHA (obstructive sleep apnea)     Screen for colon cancer     Urinary retention     Uterine fibroid        Past Surgical History:        Procedure Laterality Date     SECTION         Social History:    Social History     Tobacco Use    Smoking status: Never Smoker    Smokeless tobacco: Never Used   Substance Use Topics    Alcohol use: No     Alcohol/week: 0.0 standard drinks                                Counseling given: Not Answered      Vital Signs (Current): There were no vitals filed for this visit.                                            BP Readings from Last 3 Encounters:   19 120/78   19 (!) 142/99   19 121/80       NPO Status:                                                                                 BMI:   Wt Readings from Last 3 Encounters:   19 173 lb (78.5 kg)   19 179 lb 8 oz (81.4 kg)   19 180 lb (81.6 kg)     There is no height or weight on file to calculate BMI.    CBC:   Lab Results   Component Value Date    WBC 7.5 2018    RBC 4.46 2018    HGB 12.4 2018    HCT 39.4 2018    MCV 88.3 2018    RDW 12.8 2018     2018       CMP:   Lab Results   Component Value Date     2019    K 4.5 2019     2019    CO2 23 2019    BUN 5 2019    CREATININE 0.57 2019    GFRAA >60 2019    LABGLOM >60 2019    GLUCOSE 78 2019    PROT 7.4 2019    CALCIUM 9.9 2019    BILITOT 0.67 2019    ALKPHOS 86 2019    AST 19 2019

## 2019-08-01 NOTE — H&P
HISTORY and [facility] [id number]       NAME:  Brad Del Valle  MRN: [id number]   YOB: 1970   Date: 2019   Age: 44 y.o. Gender: female       COMPLAINT AND PRESENT HISTORY:   Brad Del Valle is 44 y.o.,  female, will be having a Colonoscopy and EGD. No prior Colonoscopy or  EGD was done. Patient has no history of GI /Rectal bleeding. patiient admits to abdominal cramping, bloating and IBS. Pt states that she has been on gluten free diet and thinks she may have celiac disease. Pt reports dysphagia and difficulty with food getting stuck at times. Pt has to resolve to drinking more fluids to assist with swallowing. Pt has hx of GERD and halitosis. Pt takes an antacid and hydration for her dry mouth. No fever or chills.       PAST MEDICAL HISTORY     Past Medical History:   Diagnosis Date    Allergic rhinitis     Asthma     Excessive bleeding     GERD (gastroesophageal reflux disease)     Halitosis     Hypothyroidism     SACHA (obstructive sleep apnea)     Screen for colon cancer     Urinary retention     Uterine fibroid        SURGICAL HISTORY       Past Surgical History:   Procedure Laterality Date     SECTION         FAMILY HISTORY       Family History   Problem Relation Age of Onset    Heart Disease Mother 48    High Blood Pressure Mother 48    Diabetes Father 61    Heart Disease Father 48    High Blood Pressure Father 48    Diabetes Paternal Uncle     Breast Cancer Maternal Grandmother 79        breast     Diabetes Paternal Grandmother     Stroke Neg Hx     Colon Cancer Neg Hx     Uterine Cancer Neg Hx     Ovarian Cancer Neg Hx        SOCIAL HISTORY       Social History     Socioeconomic History    Marital status:      Spouse name: None    Number of children: None    Years of education: None    Highest education level: None   Occupational History    None   Social Needs    Financial resource strain: None    Food insecurity: Worry: None     Inability: None    Transportation needs:     Medical: None     Non-medical: None   Tobacco Use    Smoking status: Never Smoker    Smokeless tobacco: Never Used   Substance and Sexual Activity    Alcohol use: No     Alcohol/week: 0.0 standard drinks    Drug use: No    Sexual activity: Not Currently   Lifestyle    Physical activity:     Days per week: None     Minutes per session: None    Stress: None   Relationships    Social connections:     Talks on phone: None     Gets together: None     Attends Spiritism service: None     Active member of club or organization: None     Attends meetings of clubs or organizations: None     Relationship status: None    Intimate partner violence:     Fear of current or ex partner: None     Emotionally abused: None     Physically abused: None     Forced sexual activity: None   Other Topics Concern    None   Social History Narrative    None           REVIEW OF SYSTEMS      No Known Allergies    No current facility-administered medications on file prior to encounter. Current Outpatient Medications on File Prior to Encounter   Medication Sig Dispense Refill    Multiple Vitamins-Minerals (THERAPEUTIC MULTIVITAMIN-MINERALS) tablet Take 1 tablet by mouth daily      levothyroxine (SYNTHROID) 137 MCG tablet Take 1 tablet by mouth Daily 90 tablet 3    ibandronate (BONIVA) 150 MG tablet Take 1 tablet by mouth every 30 days 3 tablet 1    montelukast (SINGULAIR) 10 MG tablet Take 1 tablet by mouth nightly 90 tablet 3    albuterol sulfate HFA (PROAIR HFA) 108 (90 BASE) MCG/ACT inhaler Inhale 2 puffs into the lungs every 6 hours as needed for Wheezing 3 Inhaler 3       Negative except for what is mentioned in the HPI. GENERAL PHYSICAL EXAM     Vitals: /83   Pulse 71   Temp 97.9 °F (36.6 °C)   Resp 16   Ht 5' 3\" (1.6 m)   Wt 173 lb (78.5 kg)   LMP 04/23/2018   SpO2 100%   BMI 30.65 kg/m²  Body mass index is 30.65 kg/m².      GENERAL APPEARANCE:   Cedrick Barrios is 44 y.o.,  female, moderately obese, nourished, conscious, alert. Does not appear to be distress or pain at this time. SKIN:  Warm, dry, no cyanosis or jaundice. HEAD:  Normocephalic, atraumatic, no swelling or tenderness. EYES:  Pupils equal, reactive to light. EARS:  No discharge, no marked hearing loss. NOSE:  No rhinorrhea, epistaxis or septal deformity. THROAT:  Not congested. No ulceration bleeding or discharge. NECK:  No stiffness, trachea central.  No palpable masses or L.N.                 CHEST:  Symmetrical and equal on expansion. HEART:  RRR S1 > S2. No audible murmurs or gallops. LUNGS:  Equal on expansion, normal breath sounds. No adventitious sounds. ABDOMEN:  Obese. Soft on palpation. No dysphagia, No localized tenderness. No guarding or rigidity. No palpable hepatosplenomegaly. LYMPHATICS:  No palpable cervical lymphadenopathy. LOCOMOTOR, BACK AND SPINE:  No tenderness or deformities. EXTREMITIES:  Symmetrical, no pretibial edema. Marielles sign negative. No discoloration or ulcerations. NEUROLOGIC:  The patient is conscious, alert, oriented,Cranial nerve II-XII intact, taste and smell were not examined. No apparent focal sensory or motor deficits.              PROVISIONAL DIAGNOSES / SURGERY:      GERD  HALITOSIS  COLORECTAL CANCER SCREENING  EGD ESOPHAGOGASTRODUODENOSCOPY        Patient Active Problem List    Diagnosis Date Noted    Halitosis     Screen for colon cancer     History of  section 2019    Fibroid uterus 2019    Pelvic pressure in female 2019    Urinary retention 2019    Hypothyroidism due to Hashimoto's thyroiditis 10/01/2017    Osteoporosis 2017    SACHA (obstructive sleep apnea)     Mild intermittent asthma without complication 59/01/9794    Seasonal allergies 07/25/2016    Gastroesophageal reflux disease 07/25/2016    Vitamin D deficiency 07/25/2016    Osteopenia 06/17/2015           YENNY Richmond, APRN - CNP on 8/1/2019 at 9:31 AM

## 2019-08-02 ENCOUNTER — TELEPHONE (OUTPATIENT)
Dept: GASTROENTEROLOGY | Age: 49
End: 2019-08-02

## 2019-08-02 ENCOUNTER — HOSPITAL ENCOUNTER (OUTPATIENT)
Age: 49
Discharge: HOME | End: 2019-08-02
Payer: COMMERCIAL

## 2019-08-02 LAB — SURGICAL PATHOLOGY REPORT: NORMAL

## 2019-08-02 PROCEDURE — 36415 COLL VENOUS BLD VENIPUNCTURE: CPT

## 2019-08-02 PROCEDURE — 82785 ASSAY OF IGE: CPT

## 2019-08-02 PROCEDURE — 86003 ALLG SPEC IGE CRUDE XTRC EA: CPT

## 2019-08-04 ASSESSMENT — ENCOUNTER SYMPTOMS
RHINORRHEA: 1
SINUS PRESSURE: 1
VOICE CHANGE: 0

## 2019-08-07 LAB
2000687N OAK TREE IGE: <0.34 KU/L (ref 0–0.34)
ALLERGEN BERMUDA GRASS IGE: <0.34 KU/L (ref 0–0.34)
ALLERGEN BIRCH IGE: <0.34 KU/L (ref 0–0.34)
ALLERGEN DOG DANDER IGE: <0.34 KU/L (ref 0–0.34)
ALLERGEN GERMAN COCKROACH IGE: <0.34 KU/L (ref 0–0.34)
ALLERGEN HELMINTHOSPORIUM HALODES: <0.34 KU/L (ref 0–0.34)
ALLERGEN HORMODENDRUM IGE: <0.34 KUL/L (ref 0–0.34)
ALLERGEN JOHNSON GRASS IGE: <0.34 KU/L (ref 0–0.34)
ALLERGEN LAMB'S QUARTER IGE: <0.34 KU/L (ref 0–0.34)
ALLERGEN PHOMA BETAE: <0.34 KU/L (ref 0–0.34)
ALLERGEN PIGWEED ROUGH IGE: <0.34 KU/L (ref 0–0.34)
ALLERGEN PINE WHITE IGE: <0.34 KU/L (ref 0–0.34)
ALLERGEN SHEEP SORREL (W18) IGE: <0.34 KU/L (ref 0–0.34)
ALLERGEN STEMPHYLIUM HERBARUM IGE: <0.34 KU/L (ref 0–0.34)
ALLERGEN TREE SYCAMORE: <0.34 KU/L (ref 0–0.34)
ALLERGEN WALNUT TREE IGE: <0.34 KU/L (ref 0–0.34)
ALTERNARIA ALTERNATA: <0.34 KU/L (ref 0–0.34)
ASPERGILLUS FUMIGATUS: <0.34 KU/L (ref 0–0.34)
CANDIDA ALBICANS IGE: <0.34 KU/L (ref 0–0.34)
CAT DANDER ANTIBODY: <0.34 KU/L (ref 0–0.34)
COTTONWOOD TREE: <0.34 KU/L (ref 0–0.34)
D. FARINAE: <0.34 KU/L (ref 0–0.34)
D. PTERONYSSINUS: <0.34 KU/L (ref 0–0.34)
ELM TREE: <0.34 KU/L (ref 0–0.34)
FUSARIUM MONILIFORME: <0.34 KU/L (ref 0–0.34)
IGE: 10 IU/ML
MAPLE/BOXELDER TREE: <0.34 KU/L (ref 0–0.34)
P. NOTATUM: <0.34 KU/L (ref 0–0.34)
SHORT RAGWD(A ARTEMIS.) IGE: <0.34 KU/L (ref 0–0.34)
TIMOTHY GRASS: <0.34 KU/L (ref 0–0.34)

## 2019-08-21 ENCOUNTER — OFFICE VISIT (OUTPATIENT)
Dept: FAMILY MEDICINE CLINIC | Age: 49
End: 2019-08-21
Payer: COMMERCIAL

## 2019-08-21 VITALS
HEIGHT: 63 IN | BODY MASS INDEX: 29.77 KG/M2 | RESPIRATION RATE: 20 BRPM | TEMPERATURE: 98.5 F | WEIGHT: 168 LBS | DIASTOLIC BLOOD PRESSURE: 70 MMHG | SYSTOLIC BLOOD PRESSURE: 120 MMHG

## 2019-08-21 DIAGNOSIS — G47.33 OBSTRUCTIVE SLEEP APNEA SYNDROME: ICD-10-CM

## 2019-08-21 DIAGNOSIS — E66.3 OVERWEIGHT (BMI 25.0-29.9): Primary | ICD-10-CM

## 2019-08-21 DIAGNOSIS — Z12.11 SCREENING FOR COLORECTAL CANCER: ICD-10-CM

## 2019-08-21 DIAGNOSIS — Z12.12 SCREENING FOR COLORECTAL CANCER: ICD-10-CM

## 2019-08-21 PROCEDURE — 99214 OFFICE O/P EST MOD 30 MIN: CPT | Performed by: PEDIATRICS

## 2019-08-21 RX ORDER — PHENTERMINE HYDROCHLORIDE 37.5 MG/1
37.5 TABLET ORAL
Qty: 30 TABLET | Refills: 0 | Status: SHIPPED | OUTPATIENT
Start: 2019-08-21 | End: 2019-09-20

## 2019-08-21 ASSESSMENT — ENCOUNTER SYMPTOMS
DIARRHEA: 0
SHORTNESS OF BREATH: 0
STRIDOR: 0
COUGH: 0
SINUS PRESSURE: 1
ABDOMINAL PAIN: 0
BACK PAIN: 0
CHEST TIGHTNESS: 0
EYE PAIN: 0
VOICE CHANGE: 0
WHEEZING: 0
NAUSEA: 0
COLOR CHANGE: 0
VOMITING: 0
BLOOD IN STOOL: 0
RHINORRHEA: 1
PHOTOPHOBIA: 0
SORE THROAT: 0
CONSTIPATION: 0
EYE REDNESS: 0

## 2019-08-21 NOTE — PROGRESS NOTES
disturbance. Respiratory: Negative for cough, chest tightness, shortness of breath, wheezing and stridor. Cardiovascular: Negative for chest pain, palpitations and leg swelling. Gastrointestinal: Negative for abdominal pain, blood in stool, constipation, diarrhea, nausea and vomiting. Genitourinary: Negative for decreased urine volume, dysuria, hematuria and urgency. Musculoskeletal: Negative for back pain and neck pain. Skin: Negative for color change and rash. Neurological: Positive for headaches. Negative for dizziness, seizures, syncope, weakness and numbness. Hematological: Negative for adenopathy. Does not bruise/bleed easily. Psychiatric/Behavioral: Negative for dysphoric mood and sleep disturbance. The patient is not nervous/anxious. Objective:     /70 (Site: Right Upper Arm, Position: Sitting, Cuff Size: Medium Adult)   Temp 98.5 °F (36.9 °C) (Tympanic)   Resp 20   Ht 5' 3\" (1.6 m)   Wt 168 lb (76.2 kg)   Breastfeeding? No   BMI 29.76 kg/m²        Physical Exam   Constitutional: She is oriented to person, place, and time. She appears well-developed and well-nourished. obese   HENT:   Head: Normocephalic. Right Ear: External ear normal.   Left Ear: External ear normal.   Nose: Mucosal edema present. Right sinus exhibits no maxillary sinus tenderness and no frontal sinus tenderness. Left sinus exhibits no maxillary sinus tenderness and no frontal sinus tenderness. Mouth/Throat: Posterior oropharyngeal erythema present. No oropharyngeal exudate. Eyes: Pupils are equal, round, and reactive to light. Conjunctivae, EOM and lids are normal. Right eye exhibits no discharge. Left eye exhibits no discharge. No scleral icterus. Neck: Trachea normal and normal range of motion. Neck supple. Carotid bruit is not present. Cardiovascular: Normal rate, regular rhythm and normal heart sounds. Exam reveals no distant heart sounds. No murmur heard.   Pulses:       Carotid pulses are 2+ on the right side, and 2+ on the left side. Radial pulses are 2+ on the right side, and 2+ on the left side. Femoral pulses are 2+ on the right side, and 2+ on the left side. Pulmonary/Chest: Effort normal and breath sounds normal. No stridor. She has no wheezes. She has no rales. Abdominal: Soft. Normal appearance and bowel sounds are normal. There is no tenderness. There is no rebound, no guarding and no CVA tenderness. Musculoskeletal: Normal range of motion. She exhibits no deformity. No red or swollen joints. Lymphadenopathy:     She has no cervical adenopathy. Neurological: She is alert and oriented to person, place, and time. She has normal strength and normal reflexes. No cranial nerve deficit. Coordination normal.   Skin: Skin is warm. Capillary refill takes less than 2 seconds. No rash noted. Psychiatric: She has a normal mood and affect. Her speech is normal and behavior is normal. Judgment normal. Cognition and memory are normal.   Nursing note and vitals reviewed. Assessment:      Diagnosis Orders   1. Overweight (BMI 25.0-29.9)  phentermine (ADIPEX-P) 37.5 MG tablet   2. Obstructive sleep apnea syndrome     3. Screening for colorectal cancer  Cologuard (For External Results Only)       Plan:     Proceed with refill Adipex  Continued weight reduction encouraged  Daily exercise and healthy diet recommended  Continue over-the-counter supplements regularly   Recommend cologuard for colorectal cancer screening   Call with concerns      Alex Vila received counseling on the following healthy behaviors: nutrition, exercise and medication adherence  Reviewed prior labs and health maintenance  Continue current medications, diet and exercise. Discussed use, benefit, and side effects of prescribed medications. Barriers to medication compliance addressed.    Patient given educational materials - see patient instructions  Was a self-tracking handout given in paper form or via [employer]? No    Requested Prescriptions     Signed Prescriptions Disp Refills    phentermine (ADIPEX-P) 37.5 MG tablet 30 tablet 0     Sig: Take 1 tablet by mouth every morning (before breakfast) for 30 days. All patient questions answered. Patient voiced understanding. Quality Measures    Body mass index is 29.76 kg/m². Elevated. Weight control planned discussed Healthy diet and regular exercise. BP: 120/70 Blood pressure is normal. Treatment plan consists of No treatment change needed.     Lab Results   Component Value Date    LDLCHOLESTEROL 88 05/21/2019    (goal LDL reduction with dx if diabetes is 50% LDL reduction)      PHQ Scores 6/26/2019 12/5/2018 9/27/2017 2/22/2016 6/17/2015   PHQ2 Score 2 0 0 0 0   PHQ9 Score 4 0 0 0 0     Interpretation of Total Score Depression Severity: 1-4 = Minimal depression, 5-9 = Mild depression, 10-14 = Moderate depression, 15-19 = Moderately severe depression, 20-27 = Severe depression        Electronically signed by Blaise Carrera MD on 8/25/2019 at 7:49 PM

## 2019-09-19 DIAGNOSIS — Z12.12 SCREENING FOR COLORECTAL CANCER: ICD-10-CM

## 2019-09-19 DIAGNOSIS — Z12.11 SCREENING FOR COLORECTAL CANCER: ICD-10-CM

## 2019-12-11 ENCOUNTER — HOSPITAL ENCOUNTER (OUTPATIENT)
Age: 49
Setting detail: SPECIMEN
Discharge: HOME | End: 2019-12-11
Payer: COMMERCIAL

## 2019-12-11 ENCOUNTER — OFFICE VISIT (OUTPATIENT)
Dept: FAMILY MEDICINE CLINIC | Age: 49
End: 2019-12-11
Payer: COMMERCIAL

## 2019-12-11 DIAGNOSIS — M85.89 OSTEOPENIA OF MULTIPLE SITES: ICD-10-CM

## 2019-12-11 DIAGNOSIS — E03.8 HYPOTHYROIDISM DUE TO HASHIMOTO'S THYROIDITIS: ICD-10-CM

## 2019-12-11 DIAGNOSIS — G47.33 OBSTRUCTIVE SLEEP APNEA SYNDROME: ICD-10-CM

## 2019-12-11 DIAGNOSIS — Z87.39 HISTORY OF OSTEOPOROSIS: ICD-10-CM

## 2019-12-11 DIAGNOSIS — R35.0 URINARY FREQUENCY: ICD-10-CM

## 2019-12-11 DIAGNOSIS — J30.2 SEASONAL ALLERGIES: ICD-10-CM

## 2019-12-11 DIAGNOSIS — K21.9 GASTROESOPHAGEAL REFLUX DISEASE, ESOPHAGITIS PRESENCE NOT SPECIFIED: ICD-10-CM

## 2019-12-11 DIAGNOSIS — E06.3 HYPOTHYROIDISM DUE TO HASHIMOTO'S THYROIDITIS: ICD-10-CM

## 2019-12-11 DIAGNOSIS — R19.6 HALITOSIS: ICD-10-CM

## 2019-12-11 DIAGNOSIS — E66.09 CLASS 1 OBESITY DUE TO EXCESS CALORIES WITHOUT SERIOUS COMORBIDITY WITH BODY MASS INDEX (BMI) OF 32.0 TO 32.9 IN ADULT: ICD-10-CM

## 2019-12-11 DIAGNOSIS — R51.9 NONINTRACTABLE EPISODIC HEADACHE, UNSPECIFIED HEADACHE TYPE: ICD-10-CM

## 2019-12-11 DIAGNOSIS — E55.9 VITAMIN D INSUFFICIENCY: ICD-10-CM

## 2019-12-11 DIAGNOSIS — G43.909 MIGRAINE WITHOUT STATUS MIGRAINOSUS, NOT INTRACTABLE, UNSPECIFIED MIGRAINE TYPE: ICD-10-CM

## 2019-12-11 DIAGNOSIS — Z12.39 SCREENING FOR BREAST CANCER: ICD-10-CM

## 2019-12-11 DIAGNOSIS — J45.20 MILD INTERMITTENT ASTHMA WITHOUT COMPLICATION: ICD-10-CM

## 2019-12-11 DIAGNOSIS — Z00.00 ROUTINE PHYSICAL EXAMINATION: Primary | ICD-10-CM

## 2019-12-11 DIAGNOSIS — R33.9 URINARY RETENTION: ICD-10-CM

## 2019-12-11 DIAGNOSIS — D25.9 UTERINE LEIOMYOMA, UNSPECIFIED LOCATION: ICD-10-CM

## 2019-12-11 LAB
THYROXINE, FREE: 1.83 NG/DL (ref 0.93–1.7)
TSH SERPL DL<=0.05 MIU/L-ACNC: 0.08 MIU/L (ref 0.3–5)
VITAMIN D 25-HYDROXY: 43 NG/ML (ref 30–100)

## 2019-12-11 PROCEDURE — 99396 PREV VISIT EST AGE 40-64: CPT | Performed by: PEDIATRICS

## 2019-12-11 RX ORDER — LEVOTHYROXINE SODIUM 0.12 MG/1
TABLET ORAL
Refills: 3 | COMMUNITY
Start: 2019-09-14 | End: 2019-12-11 | Stop reason: SDUPTHER

## 2019-12-11 RX ORDER — IBANDRONATE SODIUM 150 MG/1
150 TABLET, FILM COATED ORAL
Qty: 3 TABLET | Refills: 3 | Status: SHIPPED | OUTPATIENT
Start: 2019-12-11 | End: 2020-01-29

## 2019-12-11 RX ORDER — LEVOTHYROXINE SODIUM 0.12 MG/1
TABLET ORAL
Qty: 90 TABLET | Refills: 3 | Status: SHIPPED | OUTPATIENT
Start: 2019-12-11 | End: 2020-01-29

## 2019-12-11 ASSESSMENT — ENCOUNTER SYMPTOMS
NAUSEA: 0
PHOTOPHOBIA: 0
TROUBLE SWALLOWING: 0
COUGH: 0
EYE PAIN: 0
APNEA: 1
DIARRHEA: 0
ABDOMINAL PAIN: 0
CONSTIPATION: 0
SHORTNESS OF BREATH: 0
WHEEZING: 0
RHINORRHEA: 0
COLOR CHANGE: 0
EYE DISCHARGE: 0
VOICE CHANGE: 0
BLOOD IN STOOL: 0
EYE REDNESS: 0
STRIDOR: 0
VOMITING: 0
SINUS PRESSURE: 0

## 2019-12-12 ENCOUNTER — TELEPHONE (OUTPATIENT)
Dept: FAMILY MEDICINE CLINIC | Age: 49
End: 2019-12-12

## 2019-12-12 DIAGNOSIS — E06.3 HYPOTHYROIDISM DUE TO HASHIMOTO'S THYROIDITIS: Primary | ICD-10-CM

## 2019-12-12 DIAGNOSIS — E03.8 HYPOTHYROIDISM DUE TO HASHIMOTO'S THYROIDITIS: Primary | ICD-10-CM

## 2019-12-12 RX ORDER — LEVOTHYROXINE SODIUM 112 UG/1
112 TABLET ORAL DAILY
Qty: 30 TABLET | Refills: 5 | Status: SHIPPED | OUTPATIENT
Start: 2019-12-12 | End: 2022-06-09 | Stop reason: SDUPTHER

## 2019-12-16 VITALS
RESPIRATION RATE: 16 BRPM | SYSTOLIC BLOOD PRESSURE: 122 MMHG | WEIGHT: 171 LBS | TEMPERATURE: 98 F | HEART RATE: 72 BPM | HEIGHT: 63 IN | BODY MASS INDEX: 30.3 KG/M2 | DIASTOLIC BLOOD PRESSURE: 76 MMHG

## 2020-01-21 ENCOUNTER — NURSE ONLY (OUTPATIENT)
Dept: FAMILY MEDICINE CLINIC | Age: 50
End: 2020-01-21
Payer: COMMERCIAL

## 2020-01-21 ENCOUNTER — HOSPITAL ENCOUNTER (OUTPATIENT)
Age: 50
Setting detail: SPECIMEN
Discharge: HOME | End: 2020-01-21
Payer: COMMERCIAL

## 2020-01-21 VITALS — SYSTOLIC BLOOD PRESSURE: 139 MMHG | OXYGEN SATURATION: 100 % | HEART RATE: 61 BPM | DIASTOLIC BLOOD PRESSURE: 94 MMHG

## 2020-01-21 LAB
ALBUMIN SERPL-MCNC: 4.3 G/DL (ref 3.5–5.2)
ALBUMIN/GLOBULIN RATIO: 1.1 (ref 1–2.5)
ALP BLD-CCNC: 89 U/L (ref 35–104)
ALT SERPL-CCNC: 20 U/L (ref 5–33)
ANION GAP SERPL CALCULATED.3IONS-SCNC: 13 MMOL/L (ref 9–17)
AST SERPL-CCNC: 20 U/L
BILIRUB SERPL-MCNC: 0.51 MG/DL (ref 0.3–1.2)
BUN BLDV-MCNC: 9 MG/DL (ref 6–20)
BUN/CREAT BLD: ABNORMAL (ref 9–20)
CALCIUM SERPL-MCNC: 11.1 MG/DL (ref 8.6–10.4)
CHLORIDE BLD-SCNC: 105 MMOL/L (ref 98–107)
CO2: 22 MMOL/L (ref 20–31)
CREAT SERPL-MCNC: 0.63 MG/DL (ref 0.5–0.9)
GFR AFRICAN AMERICAN: >60 ML/MIN
GFR NON-AFRICAN AMERICAN: >60 ML/MIN
GFR SERPL CREATININE-BSD FRML MDRD: ABNORMAL ML/MIN/{1.73_M2}
GFR SERPL CREATININE-BSD FRML MDRD: ABNORMAL ML/MIN/{1.73_M2}
GLUCOSE BLD-MCNC: 90 MG/DL (ref 70–99)
POTASSIUM SERPL-SCNC: 4.3 MMOL/L (ref 3.7–5.3)
SODIUM BLD-SCNC: 140 MMOL/L (ref 135–144)
TOTAL CK: 61 U/L (ref 26–192)
TOTAL PROTEIN: 8.2 G/DL (ref 6.4–8.3)
TROPONIN INTERP: NORMAL
TROPONIN T: NORMAL NG/ML
TROPONIN, HIGH SENSITIVITY: <6 NG/L (ref 0–14)
TSH SERPL DL<=0.05 MIU/L-ACNC: 0.88 MIU/L (ref 0.3–5)

## 2020-01-21 PROCEDURE — 93000 ELECTROCARDIOGRAM COMPLETE: CPT | Performed by: FAMILY MEDICINE

## 2020-01-22 ENCOUNTER — TELEPHONE (OUTPATIENT)
Dept: FAMILY MEDICINE CLINIC | Age: 50
End: 2020-01-22

## 2020-01-22 NOTE — TELEPHONE ENCOUNTER
----- Message from Diane Guan MD sent at 1/22/2020  8:51 AM EST -----  Calcium elevated. Please order add PTH/dede.  Other blood work wnl. Thank you.

## 2020-01-23 ENCOUNTER — APPOINTMENT (OUTPATIENT)
Dept: GENERAL RADIOLOGY | Age: 50
End: 2020-01-23
Payer: COMMERCIAL

## 2020-01-23 ENCOUNTER — HOSPITAL ENCOUNTER (EMERGENCY)
Age: 50
Discharge: HOME | End: 2020-01-23
Attending: EMERGENCY MEDICINE
Payer: COMMERCIAL

## 2020-01-23 ENCOUNTER — OFFICE VISIT (OUTPATIENT)
Dept: FAMILY MEDICINE CLINIC | Age: 50
End: 2020-01-23
Payer: COMMERCIAL

## 2020-01-23 VITALS
SYSTOLIC BLOOD PRESSURE: 155 MMHG | RESPIRATION RATE: 16 BRPM | BODY MASS INDEX: 30.12 KG/M2 | HEART RATE: 69 BPM | OXYGEN SATURATION: 100 % | HEIGHT: 63 IN | WEIGHT: 170 LBS | TEMPERATURE: 97.7 F | DIASTOLIC BLOOD PRESSURE: 97 MMHG

## 2020-01-23 VITALS — SYSTOLIC BLOOD PRESSURE: 150 MMHG | DIASTOLIC BLOOD PRESSURE: 111 MMHG

## 2020-01-23 LAB
ABSOLUTE EOS #: 0.19 K/UL (ref 0–0.44)
ABSOLUTE IMMATURE GRANULOCYTE: 0.02 K/UL (ref 0–0.3)
ABSOLUTE LYMPH #: 2.28 K/UL (ref 1.1–3.7)
ABSOLUTE MONO #: 0.29 K/UL (ref 0.1–1.2)
ANION GAP SERPL CALCULATED.3IONS-SCNC: 10 MMOL/L (ref 9–17)
BASOPHILS # BLD: 1 % (ref 0–2)
BASOPHILS ABSOLUTE: 0.05 K/UL (ref 0–0.2)
BUN BLDV-MCNC: 9 MG/DL (ref 6–20)
BUN/CREAT BLD: 15 (ref 9–20)
CALCIUM SERPL-MCNC: 10.5 MG/DL (ref 8.6–10.4)
CHLORIDE BLD-SCNC: 108 MMOL/L (ref 98–107)
CO2: 24 MMOL/L (ref 20–31)
CREAT SERPL-MCNC: 0.61 MG/DL (ref 0.5–0.9)
DIFFERENTIAL TYPE: NORMAL
EKG ATRIAL RATE: 64 BPM
EKG P AXIS: 42 DEGREES
EKG P-R INTERVAL: 140 MS
EKG Q-T INTERVAL: 400 MS
EKG QRS DURATION: 86 MS
EKG QTC CALCULATION (BAZETT): 412 MS
EKG R AXIS: 5 DEGREES
EKG T AXIS: 6 DEGREES
EKG VENTRICULAR RATE: 64 BPM
EOSINOPHILS RELATIVE PERCENT: 3 % (ref 1–4)
GFR AFRICAN AMERICAN: >60 ML/MIN
GFR NON-AFRICAN AMERICAN: >60 ML/MIN
GFR SERPL CREATININE-BSD FRML MDRD: ABNORMAL ML/MIN/{1.73_M2}
GFR SERPL CREATININE-BSD FRML MDRD: ABNORMAL ML/MIN/{1.73_M2}
GLUCOSE BLD-MCNC: 94 MG/DL (ref 70–99)
HCT VFR BLD CALC: 42.3 % (ref 36.3–47.1)
HEMOGLOBIN: 13.5 G/DL (ref 11.9–15.1)
IMMATURE GRANULOCYTES: 0 %
INR BLD: 1
LYMPHOCYTES # BLD: 41 % (ref 24–43)
MCH RBC QN AUTO: 29.8 PG (ref 25.2–33.5)
MCHC RBC AUTO-ENTMCNC: 31.9 G/DL (ref 28.4–34.8)
MCV RBC AUTO: 93.4 FL (ref 82.6–102.9)
MONOCYTES # BLD: 5 % (ref 3–12)
MYOGLOBIN: <21 NG/ML (ref 25–58)
MYOGLOBIN: <21 NG/ML (ref 25–58)
NRBC AUTOMATED: 0 PER 100 WBC
PARTIAL THROMBOPLASTIN TIME: 26.2 SEC (ref 23–31)
PDW BLD-RTO: 12.8 % (ref 11.8–14.4)
PLATELET # BLD: 253 K/UL (ref 138–453)
PLATELET ESTIMATE: NORMAL
PMV BLD AUTO: 9.6 FL (ref 8.1–13.5)
POTASSIUM SERPL-SCNC: 3.9 MMOL/L (ref 3.7–5.3)
PROTHROMBIN TIME: 10.3 SEC (ref 9.7–11.6)
PTH INTACT: 68.75 PG/ML (ref 15–65)
RBC # BLD: 4.53 M/UL (ref 3.95–5.11)
RBC # BLD: NORMAL 10*6/UL
SEG NEUTROPHILS: 50 % (ref 36–65)
SEGMENTED NEUTROPHILS ABSOLUTE COUNT: 2.71 K/UL (ref 1.5–8.1)
SODIUM BLD-SCNC: 142 MMOL/L (ref 135–144)
TROPONIN INTERP: ABNORMAL
TROPONIN INTERP: ABNORMAL
TROPONIN T: ABNORMAL NG/ML
TROPONIN T: ABNORMAL NG/ML
TROPONIN, HIGH SENSITIVITY: <6 NG/L (ref 0–14)
TROPONIN, HIGH SENSITIVITY: <6 NG/L (ref 0–14)
WBC # BLD: 5.5 K/UL (ref 3.5–11.3)
WBC # BLD: NORMAL 10*3/UL

## 2020-01-23 PROCEDURE — 84484 ASSAY OF TROPONIN QUANT: CPT

## 2020-01-23 PROCEDURE — 85730 THROMBOPLASTIN TIME PARTIAL: CPT

## 2020-01-23 PROCEDURE — 99285 EMERGENCY DEPT VISIT HI MDM: CPT

## 2020-01-23 PROCEDURE — 85610 PROTHROMBIN TIME: CPT

## 2020-01-23 PROCEDURE — 85025 COMPLETE CBC W/AUTO DIFF WBC: CPT

## 2020-01-23 PROCEDURE — 80048 BASIC METABOLIC PNL TOTAL CA: CPT

## 2020-01-23 PROCEDURE — 99214 OFFICE O/P EST MOD 30 MIN: CPT | Performed by: FAMILY MEDICINE

## 2020-01-23 PROCEDURE — 83970 ASSAY OF PARATHORMONE: CPT

## 2020-01-23 PROCEDURE — 93005 ELECTROCARDIOGRAM TRACING: CPT | Performed by: EMERGENCY MEDICINE

## 2020-01-23 PROCEDURE — 71045 X-RAY EXAM CHEST 1 VIEW: CPT

## 2020-01-23 PROCEDURE — 83874 ASSAY OF MYOGLOBIN: CPT

## 2020-01-23 RX ORDER — LOSARTAN POTASSIUM 50 MG/1
100 TABLET ORAL DAILY
Qty: 60 TABLET | Refills: 0 | Status: SHIPPED | OUTPATIENT
Start: 2020-01-23 | End: 2020-08-17

## 2020-01-23 RX ORDER — LOSARTAN POTASSIUM 50 MG/1
50 TABLET ORAL DAILY
COMMUNITY
End: 2020-01-23

## 2020-01-23 ASSESSMENT — ENCOUNTER SYMPTOMS
EYE DISCHARGE: 0
VOMITING: 0
EYE REDNESS: 0
SHORTNESS OF BREATH: 0
FACIAL SWELLING: 0
WHEEZING: 0
CONSTIPATION: 0
DIARRHEA: 0
SHORTNESS OF BREATH: 0
COUGH: 1
ABDOMINAL PAIN: 0
COLOR CHANGE: 0

## 2020-01-23 ASSESSMENT — PAIN DESCRIPTION - ORIENTATION: ORIENTATION: POSTERIOR;LEFT

## 2020-01-23 ASSESSMENT — PAIN DESCRIPTION - LOCATION: LOCATION: NECK;CHEST

## 2020-01-23 ASSESSMENT — PAIN SCALES - GENERAL: PAINLEVEL_OUTOF10: 3

## 2020-01-23 NOTE — ED PROVIDER NOTES
[address] [place] [address] ED  EMERGENCY DEPARTMENT ENCOUNTER      Pt Name: Laura Alarcon  MRN: [id number]  [place] 1970  Date of evaluation: 2020  Provider: Brandy Dooley MD    CHIEF COMPLAINT       Chief Complaint   Patient presents with    Chest Pain     lt chest pain past 3 days . .negative EKG at onset    Neck Pain     posterior neck pain    Cough     past month         HISTORY OF PRESENT ILLNESS  (Location/Symptom, Timing/Onset, Context/Setting, Quality, Duration, Modifying Factors, Severity.)   Laura Alarcon is a 44 y.o. female who presents to the emergency department for chest pain. It is in the middle and left part of her chest that goes to her left arm and to her the back of her neck as well. She is also had a slight cough and has been under a lot of stress recently, particularly at work. She rated the pain as a 3. No fever or productive cough. Nursing Notes were reviewed. ALLERGIES     Patient has no known allergies.     CURRENT MEDICATIONS       Previous Medications    ALBUTEROL SULFATE HFA (PROAIR HFA) 108 (90 BASE) MCG/ACT INHALER    Inhale 2 puffs into the lungs every 6 hours as needed for Wheezing    IBANDRONATE (BONIVA) 150 MG TABLET    Take 1 tablet by mouth every 30 days    LEVOTHYROXINE (SYNTHROID) 112 MCG TABLET    Take 1 tablet by mouth daily    LEVOTHYROXINE (SYNTHROID) 125 MCG TABLET    TAKE 1 TABLET BY MOUTH ONCE DAILY    MONTELUKAST (SINGULAIR) 10 MG TABLET    Take 1 tablet by mouth nightly       PAST MEDICAL HISTORY         Diagnosis Date    Allergic rhinitis     Asthma     Excessive bleeding     GERD (gastroesophageal reflux disease)     Halitosis     Hypertension     Hypothyroidism     SACHA (obstructive sleep apnea)     Screen for colon cancer     Urinary retention     Uterine fibroid        SURGICAL HISTORY           Procedure Laterality Date     SECTION      UPPER GASTROINTESTINAL ENDOSCOPY N/A 2019    [facility] HISTORY           Problem Relation Age of Onset    Heart Disease Mother 48    High Blood Pressure Mother 48    Diabetes Father 61    Heart Disease Father 48    High Blood Pressure Father 48    Diabetes Paternal Uncle     Breast Cancer Maternal Grandmother 79        breast     Diabetes Paternal Grandmother     Stroke Neg Hx     Colon Cancer Neg Hx     Uterine Cancer Neg Hx     Ovarian Cancer Neg Hx      Family Status   Relation Name Status    Mother      Father  (Not Specified)    PUnc  (Not Specified)    MGM  (Not Specified)    PGM  (Not Specified)    Neg Hx  (Not Specified)        SOCIAL HISTORY      reports that she has never smoked. She has never used smokeless tobacco. She reports that she does not drink alcohol or use drugs. REVIEW OF SYSTEMS    (2-9 systems for level 4, 10 or more for level 5)     Review of Systems   Constitutional: Negative for chills, fatigue and fever. HENT: Negative for congestion, ear discharge and facial swelling. Eyes: Negative for discharge and redness. Respiratory: Positive for cough. Negative for shortness of breath. Cardiovascular: Positive for chest pain. Gastrointestinal: Negative for abdominal pain, constipation, diarrhea and vomiting. Genitourinary: Negative for dysuria and hematuria. Musculoskeletal: Negative for arthralgias. Skin: Negative for color change and rash. Neurological: Negative for syncope, numbness and headaches. Hematological: Negative for adenopathy. Psychiatric/Behavioral: Negative for confusion. The patient is not nervous/anxious. Except as noted above the remainder of the review of systems was reviewed and negative.      PHYSICAL EXAM    (up to 7 for level 4, 8 or more for level 5)     Vitals:    20 1112   BP: (!) 154/89   Pulse: 67   Resp: 18   Temp: 97.7 °F (36.5 °C)   TempSrc: Oral   SpO2: 100%   Weight: 170 lb (77.1 kg)   Height: 5' 3\" (1.6 m)       Physical Exam  Vitals signs reviewed. Constitutional:       General: She is not in acute distress. Appearance: She is well-developed. She is not diaphoretic. HENT:      Head: Normocephalic and atraumatic. Eyes:      General: No scleral icterus. Right eye: No discharge. Left eye: No discharge. Neck:      Musculoskeletal: Neck supple. Cardiovascular:      Rate and Rhythm: Normal rate and regular rhythm. Pulmonary:      Effort: Pulmonary effort is normal. No respiratory distress. Breath sounds: Normal breath sounds. No stridor. No wheezing or rales. Abdominal:      General: There is no distension. Palpations: Abdomen is soft. Tenderness: There is no tenderness. Musculoskeletal: Normal range of motion. Lymphadenopathy:      Cervical: No cervical adenopathy. Skin:     General: Skin is warm and dry. Findings: No erythema or rash. Neurological:      Mental Status: She is alert and oriented to person, place, and time. Psychiatric:         Behavior: Behavior normal.             DIAGNOSTIC RESULTS     EKG: All EKG's are interpreted by the Emergency Department Physician who either signs or Co-signs this chart in the absence of a cardiologist.    EKG on my interpretation shows no acute finding    RADIOLOGY:   Non-plain film images such as CT, Ultrasound and MRI are read by the radiologist. [facility] radiographic images are visualized and preliminarily interpreted by the emergency physician with the below findings:    Interpretation per the Radiologist below, if available at the time of this note:    Xr Chest Portable    Result Date: 1/23/2020  EXAMINATION: ONE XRAY VIEW OF THE CHEST 1/23/2020 11:51 am COMPARISON: None. HISTORY: ORDERING SYSTEM PROVIDED HISTORY: Chest Pain TECHNOLOGIST PROVIDED HISTORY: Chest Pain Reason for Exam: Chest pain, AP UPRIGHT PORTABLE Acuity: Acute Type of Exam: Initial FINDINGS: The cardiomediastinal silhouette is normal. No focal consolidation.  The pulmonary vascularity is normal.  There is no pleural effusion or pneumothorax. Osseous structures grossly intact. No significant abnormalities detected. LABS:  Labs Reviewed   BASIC METABOLIC PANEL - Abnormal; Notable for the following components:       Result Value    Calcium 10.5 (*)     Chloride 108 (*)     All other components within normal limits   TROP/MYOGLOBIN - Abnormal; Notable for the following components:    Myoglobin <21 (*)     All other components within normal limits   TROP/MYOGLOBIN - Abnormal; Notable for the following components:    Myoglobin <21 (*)     All other components within normal limits   CBC WITH AUTO DIFFERENTIAL   APTT   PROTIME-INR   PTH, INTACT       All other labs were within normal range or not returned as of this dictation. EMERGENCY DEPARTMENT COURSE and DIFFERENTIAL DIAGNOSIS/MDM:   Vitals:    Vitals:    20 1112   BP: (!) 154/89   Pulse: 67   Resp: 18   Temp: 97.7 °F (36.5 °C)   TempSrc: Oral   SpO2: 100%   Weight: 170 lb (77.1 kg)   Height: 5' 3\" (1.6 m)       No orders of the defined types were placed in this encounter. Medical Decision Makin sets of cardiac markers are negative. She is able to be released. She has been seen by her cardiologist here and follow-up is arranged. CONSULTS:  None    PROCEDURES:  None    FINAL IMPRESSION      1.  Chest pain, unspecified type          DISPOSITION/PLAN   DISPOSITION Decision To Discharge 2020 01:57:45 PM      PATIENT REFERRED TO:   Fredis Jackson MD  [address] [id number]      As needed    [facility] ED  [address] [facility]  [phone]    If symptoms worsen    Tammie Dumas MD  [address] [place] [address]  [phone]    In 1 week        DISCHARGE MEDICATIONS:     New Prescriptions    LOSARTAN (COZAAR) 50 MG TABLET    Take 2 tablets by mouth daily         (Please note that portions of this note were completed

## 2020-01-23 NOTE — PROGRESS NOTES
Sania Garcia MD  [place] [id number] [address] [facility] [address] [place] [id number]  Dept: [phone]    Samir Pitt is a 44 y.o. female who presents today for hermedical conditions/complaints as noted below.   Samir Pitt is here today c/o Blood Pressure Check and Other (chest discomfort/tingling in hands)       HPI:     HPI    Seen for same-day visit for chest pain and elevated blood pressure readings at home  Blood pressure had been very well controlled prior to the start of this year, was not on antihypertensives, she has been going through a lot of stressors at work with recent changes, she states there are a lot of work politics, she feels overworked as of late  2 days ago developed chest pain, had EKG done  with Dr. Cierra Toro that was normal, chest pain has been getting more intense, she currently has the pain at the left side of her chest, not exertional, feeling dizzy, left-sided arm pain and tingling, no nausea or vomiting, no syncope or presyncope  She spoke with a cardiologist who is a friend of the family who started her on ASA and losartan 50 mg, blood pressure continues to be elevated yesterday and today  No past stress test  BP Readings from Last 3 Encounters:   20 (!) 154/89   20 (!) 150/111   20 (!) 139/94       Patient Active Problem List   Diagnosis    Osteopenia    Mild intermittent asthma without complication    Seasonal allergies    Gastroesophageal reflux disease    Vitamin D deficiency    SACHA (obstructive sleep apnea)    Osteoporosis    Hypothyroidism due to Hashimoto's thyroiditis    History of  section    Fibroid uterus    Pelvic pressure in female    Urinary retention    Halitosis       Past Medical History:   Diagnosis Date    Allergic rhinitis     Asthma     Excessive bleeding     GERD (gastroesophageal reflux disease)     Halitosis     Hypertension     Hypothyroidism     SACHA (obstructive sleep apnea)     Screen for colon cancer     Urinary retention     Uterine fibroid      Past Surgical History:   Procedure Laterality Date     SECTION      UPPER GASTROINTESTINAL ENDOSCOPY N/A 2019    EGD BIOPSY performed by Ashley Trinidad MD at [facility]     Family History   Problem Relation Age of Onset    Heart Disease Mother 48    High Blood Pressure Mother 48    Diabetes Father 61    Heart Disease Father 48    High Blood Pressure Father 48    Diabetes Paternal Uncle     Breast Cancer Maternal Grandmother 79        breast     Diabetes Paternal Grandmother     Stroke Neg Hx     Colon Cancer Neg Hx     Uterine Cancer Neg Hx     Ovarian Cancer Neg Hx      Social History     Tobacco Use    Smoking status: Never Smoker    Smokeless tobacco: Never Used   Substance Use Topics    Alcohol use: No     Alcohol/week: 0.0 standard drinks    Drug use: No       Current Outpatient Medications:     losartan (COZAAR) 50 MG tablet, Take 2 tablets by mouth daily, Disp: 60 tablet, Rfl: 0    levothyroxine (SYNTHROID) 112 MCG tablet, Take 1 tablet by mouth daily, Disp: 30 tablet, Rfl: 5    levothyroxine (SYNTHROID) 125 MCG tablet, TAKE 1 TABLET BY MOUTH ONCE DAILY, Disp: 90 tablet, Rfl: 3    ibandronate (BONIVA) 150 MG tablet, Take 1 tablet by mouth every 30 days, Disp: 3 tablet, Rfl: 3    albuterol sulfate HFA (PROAIR HFA) 108 (90 BASE) MCG/ACT inhaler, Inhale 2 puffs into the lungs every 6 hours as needed for Wheezing, Disp: 3 Inhaler, Rfl: 3    montelukast (SINGULAIR) 10 MG tablet, Take 1 tablet by mouth nightly, Disp: 90 tablet, Rfl: 3    Subjective:     Review of Systems   Constitutional: Negative for fever. Respiratory: Negative for shortness of breath and wheezing. Cardiovascular: Positive for chest pain. Negative for palpitations and leg swelling. Musculoskeletal: Positive for myalgias. Neurological: Positive for dizziness. Negative for syncope. Psychiatric/Behavioral: Negative for suicidal ideas. Objective:     BP (!) 150/111   LMP 04/23/2018     Physical Exam  Constitutional:       Appearance: Normal appearance. She is well-developed. She is not ill-appearing, toxic-appearing or diaphoretic. HENT:      Head: Normocephalic. Nose: No congestion or rhinorrhea. Eyes:      Conjunctiva/sclera: Conjunctivae normal.   Neck:      Musculoskeletal: Normal range of motion. No neck rigidity. Cardiovascular:      Rate and Rhythm: Normal rate. Heart sounds: Normal heart sounds. No murmur. Pulmonary:      Effort: Pulmonary effort is normal. No respiratory distress. Breath sounds: Normal breath sounds. No stridor. No wheezing or rhonchi. Chest:      Chest wall: No tenderness. Neurological:      General: No focal deficit present. Mental Status: She is alert. Mental status is at baseline. Gait: Gait normal.   Psychiatric:         Behavior: Behavior normal.         Thought Content: Thought content normal.         Judgment: Judgment normal.      Comments: Appears stressed, behavior normal, no hallucinations or delusions apparent         Assessment & Plan:      1. Precordial pain  2. Essential hypertension  I am concerned about the patient's chest pain, left arm pain, dizziness potentially being cardiac in etiology. She needs repeat EKG, stat trops. Recommended ER evaluation. May need to be admitted for stress test. Blood pressure poorly controlled, doubled the dose of her losartan to 100 mg. She needs follow-up with her PCP post discharge from the hospital for blood pressure monitoring.  - losartan (COZAAR) 50 MG tablet; Take 2 tablets by mouth daily  Dispense: 60 tablet; Refill: 0    3. Situational stress  She is not interested in counseling or medications to help with her stressors and mood at this time, recommended ongoing follow-up with her PCP.       Call or return to clinic prn if these symptoms worsen or fail to improve as anticipated. I have reviewed the instructions with the patient, answering all questions to their satisfaction.     Electronically signed by Lorena Cary MD on 1/23/2020 at 11:52 AM

## 2020-01-24 ENCOUNTER — TELEPHONE (OUTPATIENT)
Dept: ENDOCRINOLOGY | Age: 50
End: 2020-01-24

## 2020-01-27 ENCOUNTER — PATIENT MESSAGE (OUTPATIENT)
Dept: FAMILY MEDICINE CLINIC | Age: 50
End: 2020-01-27

## 2020-01-27 ENCOUNTER — TELEPHONE (OUTPATIENT)
Dept: FAMILY MEDICINE CLINIC | Age: 50
End: 2020-01-27

## 2020-01-27 NOTE — PROGRESS NOTES
visit.                No Known Allergies         Subjective:      Review of Systems     AS NOTED IN HPI       Objective:      Physical Exam     44YEAR OLD  Monegasque FEMALE OBESE, NO DISTRESS  VITAL SIGNS REVIEWED. Eyes:  KARI  ENT: UNREMARKABLE. NO SINUS TENDERNESS. HX OF ALLERGIC RHINITIS  Neck: NO ACANTHOSIS. NO MASSES. NO ADENOPATHY. THYROID NOT ENALARGED. FAINT RT CAROTID BRUIT PRESENT. Heart:  REGULAR. NO MURMUR   Lungs: BREATHING COMFORTABLY. LUNGS CLEAR. NO WHEEZES  Abdomen: SOFT. NO TENDERNESS. NO MASSES. LIVER AND SPLEEN NOT PALPABLE  Extremities:  NO EDEMA. NO CALF TENDERNESS. Rheumatological: NO JOINT SWELLING. NO BACK TENDERNESS  Neurological/Memory: ALERT AND ORIENTED X 3.  Psychiatric: NORMAL MOOD AND AFFECT.   Skin: NO RASHES         Assessment:      LAB/IMAG    Basic Metabolic Panel   Order: [id number]   Collected:  1/23/2020 11:45      Ref Range & Units 6d ago   Glucose 70 - 99 mg/dL 94    BUN 6 - 20 mg/dL 9    CREATININE 0.50 - 0.90 mg/dL 0.61    Bun/Cre Ratio 9 - 20 15    Calcium 8.6 - 10.4 mg/dL 10.5High     Sodium 135 - 144 mmol/L 142    Potassium 3.7 - 5.3 mmol/L 3.9    Chloride 98 - 107 mmol/L 108High     CO2 20 - 31 mmol/L 24    Anion Gap 9 - 17 mmol/L 10    GFR Non-African American >60 mL/min >60    GFR African American >60 mL/min >60    GFR Comment                CBC Auto Differential   Order: [id number]   Collected:  1/23/2020 11:45      Ref Range & Units 6d ago   WBC 3.5 - 11.3 k/uL 5.5    RBC 3.95 - 5.11 m/uL 4.53    Hemoglobin 11.9 - 15.1 g/dL 13.5    Hematocrit 36.3 - 47.1 % 42.3    MCV 82.6 - 102.9 fL 93.4    MCH 25.2 - 33.5 pg 29.8    MCHC 28.4 - 34.8 g/dL 31.9    RDW 11.8 - 14.4 % 12.8    Platelets 287 - 680 k/uL 253    MPV 8.1 - 13.5 fL 9.6    NRBC Automated 0.0 per 100 WBC 0.0    Differential Type  NOT REPORTED    Seg Neutrophils 36 - 65 % 50    Lymphocytes 24 - 43 % 41    Monocytes 3 - 12 % 5    Eosinophils % 1 - 4 % 3    Basophils 0 - 2 % 1    Immature Granulocytes

## 2020-01-29 ENCOUNTER — OFFICE VISIT (OUTPATIENT)
Dept: ENDOCRINOLOGY | Age: 50
End: 2020-01-29
Payer: COMMERCIAL

## 2020-01-29 VITALS
SYSTOLIC BLOOD PRESSURE: 100 MMHG | HEART RATE: 80 BPM | HEIGHT: 63 IN | WEIGHT: 179.2 LBS | DIASTOLIC BLOOD PRESSURE: 70 MMHG | BODY MASS INDEX: 31.75 KG/M2

## 2020-01-29 PROCEDURE — 99214 OFFICE O/P EST MOD 30 MIN: CPT | Performed by: INTERNAL MEDICINE

## 2020-02-03 ENCOUNTER — HOSPITAL ENCOUNTER (OUTPATIENT)
Age: 50
Setting detail: SPECIMEN
Discharge: HOME | End: 2020-02-03
Payer: COMMERCIAL

## 2020-02-03 LAB
ANION GAP SERPL CALCULATED.3IONS-SCNC: 14 MMOL/L (ref 9–17)
BUN BLDV-MCNC: 9 MG/DL (ref 6–20)
BUN/CREAT BLD: ABNORMAL (ref 9–20)
CALCIUM IONIZED: 1.42 MMOL/L (ref 1.13–1.33)
CALCIUM SERPL-MCNC: 10.6 MG/DL (ref 8.6–10.4)
CALCIUM TIMED UR: NORMAL MG/X H
CALCIUM URINE: 6.2 MG/DL
CALCIUM, URINE: 273 MG/24 H (ref 20–275)
CHLORIDE BLD-SCNC: 105 MMOL/L (ref 98–107)
CO2: 23 MMOL/L (ref 20–31)
CREAT SERPL-MCNC: 0.64 MG/DL (ref 0.5–0.9)
GFR AFRICAN AMERICAN: >60 ML/MIN
GFR NON-AFRICAN AMERICAN: >60 ML/MIN
GFR SERPL CREATININE-BSD FRML MDRD: ABNORMAL ML/MIN/{1.73_M2}
GFR SERPL CREATININE-BSD FRML MDRD: ABNORMAL ML/MIN/{1.73_M2}
GLUCOSE BLD-MCNC: 82 MG/DL (ref 70–99)
HOURS COLLECTED: 24 H
PHOSPHORUS: 2.8 MG/DL (ref 2.6–4.5)
POTASSIUM SERPL-SCNC: 4.3 MMOL/L (ref 3.7–5.3)
PTH INTACT: 72.7 PG/ML (ref 15–65)
SODIUM BLD-SCNC: 142 MMOL/L (ref 135–144)
THYROXINE, FREE: 1.51 NG/DL (ref 0.93–1.7)
VOLUME: 4406 ML

## 2020-02-04 LAB — VITAMIN D 1,25-DIHYDROXY: 100 PG/ML (ref 19.9–79.3)

## 2020-02-05 ENCOUNTER — TELEPHONE (OUTPATIENT)
Dept: ENDOCRINOLOGY | Age: 50
End: 2020-02-05

## 2020-02-05 ENCOUNTER — OFFICE VISIT (OUTPATIENT)
Dept: FAMILY MEDICINE CLINIC | Age: 50
End: 2020-02-05
Payer: COMMERCIAL

## 2020-02-05 VITALS
DIASTOLIC BLOOD PRESSURE: 60 MMHG | RESPIRATION RATE: 16 BRPM | SYSTOLIC BLOOD PRESSURE: 100 MMHG | OXYGEN SATURATION: 98 % | BODY MASS INDEX: 31.43 KG/M2 | HEART RATE: 71 BPM | WEIGHT: 177.4 LBS

## 2020-02-05 PROBLEM — E21.3 HYPERPARATHYROIDISM (HCC): Status: ACTIVE | Noted: 2020-02-05

## 2020-02-05 PROCEDURE — 99214 OFFICE O/P EST MOD 30 MIN: CPT | Performed by: NURSE PRACTITIONER

## 2020-02-05 RX ORDER — FEXOFENADINE HCL 180 MG/1
180 TABLET ORAL DAILY
COMMUNITY
End: 2022-06-09 | Stop reason: SDUPTHER

## 2020-02-05 RX ORDER — NICOTINE POLACRILEX 4 MG/1
20 GUM, CHEWING ORAL DAILY
COMMUNITY
End: 2020-08-17

## 2020-02-05 ASSESSMENT — PATIENT HEALTH QUESTIONNAIRE - PHQ9
SUM OF ALL RESPONSES TO PHQ QUESTIONS 1-9: 2
2. FEELING DOWN, DEPRESSED OR HOPELESS: 1
1. LITTLE INTEREST OR PLEASURE IN DOING THINGS: 1
SUM OF ALL RESPONSES TO PHQ QUESTIONS 1-9: 2
SUM OF ALL RESPONSES TO PHQ9 QUESTIONS 1 & 2: 2

## 2020-02-05 ASSESSMENT — ENCOUNTER SYMPTOMS
SHORTNESS OF BREATH: 0
WHEEZING: 0

## 2020-02-05 NOTE — PATIENT INSTRUCTIONS
Armando Parikh MD   Endocrinology   Make an Appointment: [phone];  Fax: [phone] or Schedule Online   Board Certification: Endocrinology  Special Interests:   diabetes management, thyroid and adrenal gland disorders, osteoporosis  Details

## 2020-02-05 NOTE — PROGRESS NOTES
History of Present Illness:     Thad Gibbs is a 44 y.o. female who presents in office today with Self follow up on recent condition of intermittent high blood pressure, sob, dizziness, chest pressure/heaviness/pain. Onset 1/23/2020. Chest heaviness and acute chest pain and numbness down both arms. This is when she saw . Measured in that office. Unsure if related to any of her medications. Has Primary hyperparathyroidism, primarily. Vitamin D is high. Calcium is disturbed. No other concerns right now.  is the provider for endo. Also some memory issues, immediate memory call, also association with person placement. Muscle cramps, depression in January, Burning in throat, Son has hypothyroidism. No other concerns right now. HPI from family visit on 1/23/2020    Seen for same-day visit for chest pain and elevated blood pressure readings at home  Blood pressure had been very well controlled prior to the start of this year, was not on antihypertensives, she has been going through a lot of stressors at work with recent changes, she states there are a lot of work politics, she feels overworked as of late. 2 days ago developed chest pain, had EKG done 1/21 with Dr. Geo Yu that was normal, chest pain has been getting more intense, she currently has the pain at the left side of her chest, not exertional, feeling dizzy, left-sided arm pain and tingling, no nausea or vomiting, no syncope or presyncope    She spoke with a cardiologist who is a friend of the family who started her on ASA and losartan 50 mg, blood pressure continues to be elevated yesterday and today.     No past stress test    HPI    Patient Care Team:  Carlos Rodriguez MD as PCP - General (Internal Medicine)  Carlos Rodriguez MD as PCP - [facility] Empaneled Provider  Aaron Carlson MD as Consulting Physician (Gastroenterology)    Visit Information    Have you changed or started any medications since your last visit including any over-the-counter medicines, vitamins, or herbal medicines? yes - Med list updated   Are you having any side effects from any of your medications? -  yes - chest pressure, sob, dizziness  Have you stopped taking any of your medications? Is so, why? -  no  Have you seen any other physician or provider since your last visit? Yes - Records Obtained [address] [place] [address] ED 1/23/2020 Chest pain; Endocrinologist  Have you had any other diagnostic tests since your last visit? Yes - Records Obtained Blood work, Chest XR, Stress test, EKG  Have you been seen in the emergency room and/or had an admission to a hospital since we last saw you? Yes - Records Obtained 1/23/2020 Acute Chest Pain   Have you had your routine dental cleaning in the past 6 months? Yes  Have you activated your [employer] account? Yes  If activated, Do you have the mobile rom and comfortable using functions? No    Reviewed     [x] Past Medical, Family, and Social History was reviewed per writer and does contribute to the patient presenting condition.     [x] Laboratory Results, Vital signs, Imaging, Active Problems, Immunizations, Current/Recently Discontinued Medications, Health Maintenance Activities Due, Referral Notes (if available) were reviewed per writer     [x] Reviewed Depression screening if taken or valid today or any other valid screening tool (others seen below) Interpretation of Total Score DepressionSeverity: 1-4 = Minimal depression, 5-9 = Mild depression, 10-14 = Moderate depression, 15-19 = Moderately severe depression, 20-27 =Severe depression    PHQ Scores 2/5/2020 6/26/2019 12/5/2018 9/27/2017 2/22/2016 6/17/2015   PHQ2 Score 2 2 0 0 0 0   PHQ9 Score 2 4 0 0 0 0     Interpretation of Total Score Depression Severity: 1-4 = Minimal depression, 5-9 = Mild depression, 10-14 = Moderate depression, 15-19 = Moderately severe depression, 20-27 = Severe depression     Review of Systems (Subjective)     Review of Systems   Constitutional: Positive for fatigue. Negative for activity change, appetite change and fever. HENT: Positive for sore throat (burning in throat). Negative for congestion, ear pain, postnasal drip and rhinorrhea. Eyes: Negative for discharge, redness and itching. Respiratory: Positive for chest tightness. Negative for cough, shortness of breath and wheezing. Cardiovascular: Positive for chest pain. Negative for palpitations and leg swelling. Gastrointestinal: Negative for abdominal pain, constipation, diarrhea and nausea. Genitourinary: Negative for dysuria. Musculoskeletal: Positive for myalgias (muscle cramping). Negative for arthralgias. Skin: Negative for rash. Neurological: Positive for dizziness. Negative for syncope, light-headedness and headaches. Psychiatric/Behavioral: Positive for dysphoric mood (since January). Negative for sleep disturbance and suicidal ideas. The patient is not nervous/anxious. Physical Assessment (Objective)     /60 (Site: Left Upper Arm, Position: Sitting, Cuff Size: Medium Adult)   Pulse 71   Resp 16   Wt 177 lb 6.4 oz (80.5 kg)   LMP 04/23/2018   SpO2 98%   BMI 31.43 kg/m²      Physical Exam  Vitals signs reviewed. Constitutional:       General: She is awake. She is not in acute distress. Appearance: She is well-developed. HENT:      Head: Normocephalic and atraumatic. Right Ear: Tympanic membrane and external ear normal. No middle ear effusion. Left Ear: Tympanic membrane and external ear normal.  No middle ear effusion. Nose: Nose normal.      Mouth/Throat:      Pharynx: Uvula midline. No oropharyngeal exudate. Eyes:      General:         Right eye: No discharge. Left eye: No discharge. Pupils: Pupils are equal, round, and reactive to light. Cardiovascular:      Rate and Rhythm: Normal rate and regular rhythm. Pulses:           Carotid pulses are 2+ on the right side and 2+ on the left side.        Radial pulses complete or Patient to complete their Mychart Sign up and instructed on easiest way to contact writer. and Complete testing as ordered, information given on how and where to have this done    Encouraged healthy diet and exercise. Call office with any new or worsening symptoms or concerns. Return if symptoms worsen or fail to improve. \"There is beauty in all things if you choose to see it\"    Dewaine Cranker, MSN, APRN-CNP   [address] [facility]  [email or web address]   Office: [phone]   Cell: [phone]

## 2020-02-06 ENCOUNTER — HOSPITAL ENCOUNTER (OUTPATIENT)
Dept: MAMMOGRAPHY | Facility: CLINIC | Age: 50
Discharge: HOME | End: 2020-02-08
Payer: COMMERCIAL

## 2020-02-06 ENCOUNTER — TELEPHONE (OUTPATIENT)
Dept: PEDIATRICS CLINIC | Age: 50
End: 2020-02-06

## 2020-02-06 PROBLEM — M81.8 OTHER OSTEOPOROSIS WITHOUT CURRENT PATHOLOGICAL FRACTURE: Status: ACTIVE | Noted: 2017-09-27

## 2020-02-06 PROCEDURE — 77080 DXA BONE DENSITY AXIAL: CPT

## 2020-02-06 RX ORDER — OSELTAMIVIR PHOSPHATE 75 MG/1
75 CAPSULE ORAL 2 TIMES DAILY
Qty: 10 CAPSULE | Refills: 0 | Status: SHIPPED | OUTPATIENT
Start: 2020-02-06 | End: 2020-02-11

## 2020-02-06 ASSESSMENT — ENCOUNTER SYMPTOMS
SORE THROAT: 1
EYE REDNESS: 0
EYE ITCHING: 0
NAUSEA: 0
CONSTIPATION: 0
DIARRHEA: 0
RHINORRHEA: 0
CHEST TIGHTNESS: 1
COUGH: 0
ABDOMINAL PAIN: 0
EYE DISCHARGE: 0

## 2020-02-06 NOTE — TELEPHONE ENCOUNTER
MATHIEU.  The patient did have a high fever today. Temp was 102. She was swabbed and was positive for influenza B. I discussed with her that she needs to cancel her whole clinic tomorrow, she needs to go home, stay in bed, push fluids use ibuprofen Advil Aleve if needed. I will call in Tamiflu. I discussed with her to stay home 5 to 7 days. Until she feels better.

## 2020-02-07 ENCOUNTER — HOSPITAL ENCOUNTER (OUTPATIENT)
Dept: GENERAL RADIOLOGY | Age: 50
Discharge: HOME | End: 2020-02-09
Payer: COMMERCIAL

## 2020-02-07 ENCOUNTER — HOSPITAL ENCOUNTER (OUTPATIENT)
Age: 50
Discharge: HOME | End: 2020-02-07
Payer: COMMERCIAL

## 2020-02-07 ENCOUNTER — HOSPITAL ENCOUNTER (OUTPATIENT)
Age: 50
Discharge: HOME | End: 2020-02-09
Payer: COMMERCIAL

## 2020-02-07 PROCEDURE — 72072 X-RAY EXAM THORAC SPINE 3VWS: CPT

## 2020-02-07 PROCEDURE — 82384 ASSAY THREE CATECHOLAMINES: CPT

## 2020-02-07 PROCEDURE — 83835 ASSAY OF METANEPHRINES: CPT

## 2020-02-07 PROCEDURE — 36415 COLL VENOUS BLD VENIPUNCTURE: CPT

## 2020-02-10 LAB
CATECHOLAMINE INTERPRETATION, PLASMA: ABNORMAL
EPINEPHRINE PLASMA: <10 PG/ML (ref 10–200)
NOREPINEPHRINE: 399 PG/ML (ref 80–520)

## 2020-02-11 ENCOUNTER — HOSPITAL ENCOUNTER (OUTPATIENT)
Dept: NUCLEAR MEDICINE | Age: 50
Discharge: HOME | End: 2020-02-13
Payer: COMMERCIAL

## 2020-02-11 ENCOUNTER — HOSPITAL ENCOUNTER (OUTPATIENT)
Dept: ULTRASOUND IMAGING | Age: 50
Discharge: HOME | End: 2020-02-13
Payer: COMMERCIAL

## 2020-02-11 LAB
METANEPH/PLASMA INTERP: NORMAL
METANEPHRINE: 0.1 NMOL/L (ref 0–0.49)
NORMETANEPHRINE PLASMA: 0.44 NMOL/L (ref 0–0.89)

## 2020-02-11 PROCEDURE — 2580000003 HC RX 258: Performed by: INTERNAL MEDICINE

## 2020-02-11 PROCEDURE — 76536 US EXAM OF HEAD AND NECK: CPT

## 2020-02-11 PROCEDURE — 78072 PARATHYRD PLANAR W/SPECT&CT: CPT

## 2020-02-11 PROCEDURE — A9500 TC99M SESTAMIBI: HCPCS | Performed by: INTERNAL MEDICINE

## 2020-02-11 PROCEDURE — 76770 US EXAM ABDO BACK WALL COMP: CPT

## 2020-02-11 PROCEDURE — 3430000000 HC RX DIAGNOSTIC RADIOPHARMACEUTICAL: Performed by: INTERNAL MEDICINE

## 2020-02-11 RX ORDER — SODIUM CHLORIDE 0.9 % (FLUSH) 0.9 %
10 SYRINGE (ML) INJECTION PRN
Status: DISCONTINUED | OUTPATIENT
Start: 2020-02-11 | End: 2020-02-14 | Stop reason: HOSPADM

## 2020-02-11 RX ADMIN — SODIUM CHLORIDE, PRESERVATIVE FREE 10 ML: 5 INJECTION INTRAVENOUS at 09:45

## 2020-02-11 RX ADMIN — TETRAKIS(2-METHOXYISOBUTYLISOCYANIDE)COPPER(I) TETRAFLUOROBORATE 18 MILLICURIE: 1 INJECTION, POWDER, LYOPHILIZED, FOR SOLUTION INTRAVENOUS at 09:45

## 2020-02-13 ENCOUNTER — TELEPHONE (OUTPATIENT)
Dept: INTERNAL MEDICINE CLINIC | Age: 50
End: 2020-02-13

## 2020-02-13 NOTE — TELEPHONE ENCOUNTER
----- Message from Tatiana Wheeler MD sent at 2/11/2020  7:34 PM EST -----  TELL PATIENT ORDERED THIN SLICE CT SCAN OF NECK AS SUGGESTED BY RADIOLOGY

## 2020-02-20 ENCOUNTER — TELEPHONE (OUTPATIENT)
Dept: FAMILY MEDICINE CLINIC | Age: 50
End: 2020-02-20

## 2020-02-20 NOTE — TELEPHONE ENCOUNTER
I am not sure who does this type of surgery other than [facility]. I would have her call her insurance and ask. I can also send this to  to ask who does parathyroidectomy's, her friend is an endocrinologist, I would ask him his opinion as well. -Baron Freitas.  Dexter ROSARIO-CNP

## 2020-02-20 NOTE — TELEPHONE ENCOUNTER
Gurdeep Atkinson from Dr Molina Fuentes office states they are not in network with the patient's [facility]. She is requesting a referral for endocrin surgery elsewhere or a prior auth. Please call Gurdeep Atkinson to advise at [phone].

## 2020-02-21 ENCOUNTER — TELEPHONE (OUTPATIENT)
Dept: ENDOCRINOLOGY | Age: 50
End: 2020-02-21

## 2020-02-21 NOTE — TELEPHONE ENCOUNTER
I agree with SR recommendations and I would recommend evaluation with ENT - ENT physicians are trained in this surgery - Dr. Lenard Carlos group.

## 2020-03-04 ENCOUNTER — HOSPITAL ENCOUNTER (OUTPATIENT)
Dept: PREADMISSION TESTING | Age: 50
Discharge: HOME | End: 2020-03-08
Payer: COMMERCIAL

## 2020-03-04 VITALS
WEIGHT: 183.8 LBS | RESPIRATION RATE: 16 BRPM | OXYGEN SATURATION: 100 % | SYSTOLIC BLOOD PRESSURE: 117 MMHG | DIASTOLIC BLOOD PRESSURE: 74 MMHG | HEIGHT: 63 IN | HEART RATE: 67 BPM | BODY MASS INDEX: 32.57 KG/M2

## 2020-03-04 LAB
ANION GAP SERPL CALCULATED.3IONS-SCNC: 11 MMOL/L (ref 9–17)
BUN BLDV-MCNC: 10 MG/DL (ref 6–20)
BUN/CREAT BLD: 13 (ref 9–20)
CALCIUM IONIZED: 1.38 MMOL/L (ref 1.13–1.33)
CALCIUM SERPL-MCNC: 9.9 MG/DL (ref 8.6–10.4)
CHLORIDE BLD-SCNC: 105 MMOL/L (ref 98–107)
CO2: 22 MMOL/L (ref 20–31)
CREAT SERPL-MCNC: 0.75 MG/DL (ref 0.5–0.9)
GFR AFRICAN AMERICAN: >60 ML/MIN
GFR NON-AFRICAN AMERICAN: >60 ML/MIN
GFR SERPL CREATININE-BSD FRML MDRD: NORMAL ML/MIN/{1.73_M2}
GFR SERPL CREATININE-BSD FRML MDRD: NORMAL ML/MIN/{1.73_M2}
GLUCOSE BLD-MCNC: 99 MG/DL (ref 70–99)
HCT VFR BLD CALC: 38.2 % (ref 36.3–47.1)
HEMOGLOBIN: 12.4 G/DL (ref 11.9–15.1)
MCH RBC QN AUTO: 30.8 PG (ref 25.2–33.5)
MCHC RBC AUTO-ENTMCNC: 32.5 G/DL (ref 28.4–34.8)
MCV RBC AUTO: 94.8 FL (ref 82.6–102.9)
NRBC AUTOMATED: 0 PER 100 WBC
PDW BLD-RTO: 12.7 % (ref 11.8–14.4)
PLATELET # BLD: 224 K/UL (ref 138–453)
PMV BLD AUTO: 9.4 FL (ref 8.1–13.5)
POTASSIUM SERPL-SCNC: 4.3 MMOL/L (ref 3.7–5.3)
RBC # BLD: 4.03 M/UL (ref 3.95–5.11)
SODIUM BLD-SCNC: 138 MMOL/L (ref 135–144)
WBC # BLD: 6.2 K/UL (ref 3.5–11.3)

## 2020-03-04 PROCEDURE — 82330 ASSAY OF CALCIUM: CPT

## 2020-03-04 PROCEDURE — 85027 COMPLETE CBC AUTOMATED: CPT

## 2020-03-04 PROCEDURE — 36415 COLL VENOUS BLD VENIPUNCTURE: CPT

## 2020-03-04 PROCEDURE — 80048 BASIC METABOLIC PNL TOTAL CA: CPT

## 2020-03-04 RX ORDER — ASCORBIC ACID 500 MG
1000 TABLET ORAL DAILY
COMMUNITY
End: 2021-03-15

## 2020-03-04 RX ORDER — CHLORAL HYDRATE 500 MG
900 CAPSULE ORAL
COMMUNITY

## 2020-03-04 ASSESSMENT — PAIN SCALES - GENERAL: PAINLEVEL_OUTOF10: 0

## 2020-03-04 NOTE — H&P
History and Physical Service   [facility] - INPATIENT    HISTORY AND PHYSICAL EXAMINATION            Date of Evaluation: 3/4/2020  Patient name:  Dean Almanza  MRN:   [id number]  YOB: 1970  PCP:    García White MD    History Obtained From:     Patient, Medical records    History of Present Illness: This is Dean Almanza a 44 y.o. female who presents for a pre-admission testing appointment for an upcoming neck exploration and removal of parathyroid adenoma by Dr. Yoana Diamond scheduled on 03/27/2020 at 1200 due to hypercalcemia, hyperthyroidism. Calcium on 02/03/2020 was 10.6 mg/dL. The patient's chief complaints are a history of chest pressure, uncontrolled hypertension, palpitations, and depression 1 1/2 months ago. The chest pressure stopped after the pt started losartan in 01/2020. Pt denies dysphagia, tingling, excessive thirst, weakness, fatigue, headaches, and history of renal stones. History of hypothyroidism and osteoporosis. Pt went to the ED on 01/23/2020 with chest pain that radiated to her left arm, back, and neck. Pt states she also had dizziness, heart palpitations, and arm numbness. Two sets of cardiac markers were negative. See Epic for EKG from 01/23/2020. Stress test from 01/29/2020 was negative (See paper chart). Pt states she was recently evaluated by Dr. Seun Stephenson from cardiology.      Retroperitoneal ultrasound of the kidneys and urinary bladder: 02/11/2020  Impression   No evidence of obstructive uropathy.       Echogenic foci of both kidneys favor vascular calcifications but small   calculi are also considered.         Past Medical History:     Past Medical History:   Diagnosis Date    Allergic rhinitis     Asthma     Excessive bleeding     GERD (gastroesophageal reflux disease)     Halitosis     Hypertension     Hypothyroidism     SACHA (obstructive sleep apnea)     Screen for colon cancer     Urinary retention     Uterine fibroid Past Surgical History:     Past Surgical History:   Procedure Laterality Date     SECTION      UPPER GASTROINTESTINAL ENDOSCOPY N/A 2019    EGD BIOPSY performed by Yuridia Urbina MD at [address] [place] [address]        Medications Prior to Admission:     Prior to Admission medications    Medication Sig Start Date End Date Taking? Authorizing Provider   Omega-3 1000 MG CAPS Take 900 mg by mouth   Yes Historical Provider, MD   vitamin C (ASCORBIC ACID) 500 MG tablet Take 1,000 mg by mouth daily   Yes Historical Provider, MD   fexofenadine (ALLEGRA) 180 MG tablet Take 180 mg by mouth daily    Historical Provider, MD   omeprazole 20 MG EC tablet Take 20 mg by mouth daily    Historical Provider, MD   losartan (COZAAR) 50 MG tablet Take 2 tablets by mouth daily 20   Sania Garcia MD   levothyroxine (SYNTHROID) 112 MCG tablet Take 1 tablet by mouth daily 19   Dion Flor MD   albuterol sulfate HFA (PROAIR HFA) 108 (90 BASE) MCG/ACT inhaler Inhale 2 puffs into the lungs every 6 hours as needed for Wheezing 16   Dion Flor MD        Allergies:     Patient has no known allergies. Social History:     Tobacco:    reports that she has never smoked. She has never used smokeless tobacco.  Alcohol:      reports current alcohol use. Drug Use:  reports no history of drug use. Functional Capacity:   1) Pt is able to walk 2 city blocks on level ground without SOB. 2) Pt is able to climb 2 flights of stairs without SOB. 3) Pt is able to walk up a hill for 1-2 city blocks without SOB.     Family History:     Family History   Problem Relation Age of Onset    Heart Disease Mother 48    High Blood Pressure Mother 48    Diabetes Father 61    Heart Disease Father 48    High Blood Pressure Father 48    Diabetes Paternal Uncle     Breast Cancer Maternal Grandmother 79        breast     Diabetes Paternal Grandmother     Stroke Neg Hx     Colon Cancer Neg Hx     Uterine Cancer Neg HISTORY: ORDERING SYSTEM PROVIDED HISTORY: Hyperparathyroidism ([address] FINDINGS: Right thyroid lobe:  2.5 x 0.5 x 0.4 cm Left thyroid lobe:  2.3 x 0.9 x 0.6 cm Isthmus:  2 mm Thyroid Gland: The thyroid gland is small. Nodules: No thyroid nodules are present. Cervical lymphadenopathy: No abnormal lymph nodes in the imaged portions of the neck. The thyroid gland is markedly atrophic. No nodules are identified. No obvious parathyroid tissue is demonstrated. Us Renal Complete    Result Date: 2/11/2020  EXAMINATION: RETROPERITONEAL ULTRASOUND OF THE KIDNEYS AND URINARY BLADDER 2/11/2020 COMPARISON: None HISTORY: ORDERING SYSTEM PROVIDED HISTORY: Hyperparathyroidism [facility]) TECHNOLOGIST PROVIDED HISTORY: check kidneys due to new hyperparathyroidism FINDINGS: Kidneys: The right kidney measures 10.6 cm in length and the left kidney measures 9.9 cm in length. Increased renal cortical echogenicity can be seen the setting of medical renal disease. Nonshadowing or twinkling echogenic foci most likely vascular calcifications. Bladder: Prevoid bladder volume of 595 mL and postvoid residual of 16 mL. Jets not identified. No evidence of obstructive uropathy. Echogenic foci of both kidneys favor vascular calcifications but small calculi are also considered. Dexa Bone Density Axial Skeleton    Result Date: 2/6/2020  EXAMINATION: BONE DENSITOMETRY 2/6/2020 8:15 am TECHNIQUE: A bone density dual x-ray absorptiometry (DEXA) scan was performed of the lumbar spine and left hip  on a [employer] system. COMPARISON: None. HISTORY: ORDERING SYSTEM PROVIDED HISTORY: Hyperparathyroidism [facility]) TECHNOLOGIST PROVIDED HISTORY: hyperparathyroidism Is the patient pregnant?->No FINDINGS: LUMBAR SPINE: The bone mineral density in the lumbar spine including the L1-L4 levels is measured at 0.873 g/cm2, which corresponds to a T-score of -2.6 and a Z-score of -2.6. This is within the osteoporosis range by WHO criteria.  LEFT HIP: The abnormal radiotracer activity identified. INCIDENTAL CT FINDINGS: None     Small focus of radiotracer along the posterior inferior aspect of the right lobe of the thyroid on the immediate tomography not definitely confirmed on the washout images. No definite CT correlate. However, findings are suspicious for small parathyroid adenoma given laboratory results. RECOMMENDATIONS: Consider diagnostic thin slice CT scanning of the neck in this patient. Diagnosis:      1. Hypercalcemia, hyperthyroidism    Plans:     1.  Neck exploration and removal of parathyroid adenoma      ABRAHAM Roy CNP  3/4/2020  9:01 AM

## 2020-03-04 NOTE — PRE-PROCEDURE INSTRUCTIONS
ARRIVE AT [facility] [address] [facility] ON Friday, 3/27/2020 at 1000 AM    Once you enter the hospital lobby, take the elevators to the second floor. Check-In is at the surgery registration desk. Continue to take your home medications as you normally do up to and including the night before surgery with the exception of any blood thinning medications. Please stop any blood thinning medications as directed by your surgeon or prescribing physician. Failure to stop certain medications may interfere with your scheduled surgery. These may include:  Aspirin, Warfarin (Coumadin), Clopidogrel (Plavix), Ibuprofen (Motrin, Advil), Naproxen (Aleve), Meloxicam (Mobic), Celecoxib (Celebrex), Eliquis, Pradaxa, Xarelto, Effient, Fish Oil, Herbal supplements. If you are diabetic, do not take any of your diabetic medications by mouth the morning of surgery. If you are taking insulin contact the doctor that manages your diabetes for instructions about any changes to your insulin dosages the day before surgery. Do not inject insulin or other injectable diabetic medications the morning of surgery unless otherwise instructed by the doctor who manages your diabetes. Please take the following medication(s) the day of surgery with a small sip of water:  Synthroid, cozaar, omeprazole, albuterol inhaler    Please use your inhalers at home the day of surgery. PREPARING FOR YOUR SURGERY:     Before surgery, you can play an important role in your own health. Because skin is not sterile, we need to be sure that your skin is as free of germs as possible before surgery by carefully washing before surgery. Preparing or prepping skin before surgery can reduce the risk of a surgical site infection.   Do not shave the area of your body where your surgery will be performed unless you received specific permission from your physician.     You will need to shower at home the night before surgery and the morning of surgery with a special

## 2020-03-26 ENCOUNTER — ANESTHESIA EVENT (OUTPATIENT)
Dept: OPERATING ROOM | Age: 50
DRG: 627 | End: 2020-03-26
Payer: COMMERCIAL

## 2020-03-27 ENCOUNTER — HOSPITAL ENCOUNTER (INPATIENT)
Age: 50
LOS: 1 days | Discharge: HOME | DRG: 627 | End: 2020-03-28
Attending: SURGERY | Admitting: SURGERY
Payer: COMMERCIAL

## 2020-03-27 ENCOUNTER — ANESTHESIA (OUTPATIENT)
Dept: OPERATING ROOM | Age: 50
DRG: 627 | End: 2020-03-27
Payer: COMMERCIAL

## 2020-03-27 VITALS — DIASTOLIC BLOOD PRESSURE: 61 MMHG | OXYGEN SATURATION: 97 % | SYSTOLIC BLOOD PRESSURE: 97 MMHG

## 2020-03-27 PROBLEM — Z90.89 S/P PARATHYROIDECTOMY: Status: ACTIVE | Noted: 2020-03-27

## 2020-03-27 PROBLEM — E89.2 S/P PARATHYROIDECTOMY (HCC): Status: ACTIVE | Noted: 2020-03-27

## 2020-03-27 PROBLEM — Z98.890 S/P PARATHYROIDECTOMY: Status: ACTIVE | Noted: 2020-03-27

## 2020-03-27 LAB
CALCIUM IONIZED: 1.5 MMOL/L (ref 1.13–1.33)
CALCIUM SERPL-MCNC: 10.1 MG/DL (ref 8.6–10.4)
HCG, PREGNANCY URINE (POC): NEGATIVE

## 2020-03-27 PROCEDURE — 0GTN0ZZ RESECTION OF RIGHT INFERIOR PARATHYROID GLAND, OPEN APPROACH: ICD-10-PCS | Performed by: SURGERY

## 2020-03-27 PROCEDURE — 1200000000 HC SEMI PRIVATE

## 2020-03-27 PROCEDURE — 3700000001 HC ADD 15 MINUTES (ANESTHESIA): Performed by: SURGERY

## 2020-03-27 PROCEDURE — 2500000003 HC RX 250 WO HCPCS: Performed by: STUDENT IN AN ORGANIZED HEALTH CARE EDUCATION/TRAINING PROGRAM

## 2020-03-27 PROCEDURE — 6370000000 HC RX 637 (ALT 250 FOR IP): Performed by: STUDENT IN AN ORGANIZED HEALTH CARE EDUCATION/TRAINING PROGRAM

## 2020-03-27 PROCEDURE — 88331 PATH CONSLTJ SURG 1 BLK 1SPC: CPT

## 2020-03-27 PROCEDURE — 36415 COLL VENOUS BLD VENIPUNCTURE: CPT

## 2020-03-27 PROCEDURE — 82310 ASSAY OF CALCIUM: CPT

## 2020-03-27 PROCEDURE — 3600000012 HC SURGERY LEVEL 2 ADDTL 15MIN: Performed by: SURGERY

## 2020-03-27 PROCEDURE — 2580000003 HC RX 258: Performed by: ANESTHESIOLOGY

## 2020-03-27 PROCEDURE — G0378 HOSPITAL OBSERVATION PER HR: HCPCS

## 2020-03-27 PROCEDURE — 82330 ASSAY OF CALCIUM: CPT

## 2020-03-27 PROCEDURE — 6360000002 HC RX W HCPCS: Performed by: SURGERY

## 2020-03-27 PROCEDURE — 7100000000 HC PACU RECOVERY - FIRST 15 MIN: Performed by: SURGERY

## 2020-03-27 PROCEDURE — 81025 URINE PREGNANCY TEST: CPT

## 2020-03-27 PROCEDURE — 3700000000 HC ANESTHESIA ATTENDED CARE: Performed by: SURGERY

## 2020-03-27 PROCEDURE — 2500000003 HC RX 250 WO HCPCS: Performed by: NURSE ANESTHETIST, CERTIFIED REGISTERED

## 2020-03-27 PROCEDURE — 6360000002 HC RX W HCPCS: Performed by: NURSE ANESTHETIST, CERTIFIED REGISTERED

## 2020-03-27 PROCEDURE — 2709999900 HC NON-CHARGEABLE SUPPLY: Performed by: SURGERY

## 2020-03-27 PROCEDURE — 7100000001 HC PACU RECOVERY - ADDTL 15 MIN: Performed by: SURGERY

## 2020-03-27 PROCEDURE — 88305 TISSUE EXAM BY PATHOLOGIST: CPT

## 2020-03-27 PROCEDURE — 2720000010 HC SURG SUPPLY STERILE: Performed by: SURGERY

## 2020-03-27 PROCEDURE — 2580000003 HC RX 258: Performed by: SURGERY

## 2020-03-27 PROCEDURE — 3600000002 HC SURGERY LEVEL 2 BASE: Performed by: SURGERY

## 2020-03-27 RX ORDER — ONDANSETRON 2 MG/ML
INJECTION INTRAMUSCULAR; INTRAVENOUS PRN
Status: DISCONTINUED | OUTPATIENT
Start: 2020-03-27 | End: 2020-03-27 | Stop reason: SDUPTHER

## 2020-03-27 RX ORDER — SODIUM CHLORIDE 9 MG/ML
INJECTION, SOLUTION INTRAVENOUS CONTINUOUS
Status: DISCONTINUED | OUTPATIENT
Start: 2020-03-27 | End: 2020-03-27

## 2020-03-27 RX ORDER — PROPOFOL 10 MG/ML
INJECTION, EMULSION INTRAVENOUS PRN
Status: DISCONTINUED | OUTPATIENT
Start: 2020-03-27 | End: 2020-03-27 | Stop reason: SDUPTHER

## 2020-03-27 RX ORDER — SUCCINYLCHOLINE/SOD CL,ISO/PF 100 MG/5ML
SYRINGE (ML) INTRAVENOUS PRN
Status: DISCONTINUED | OUTPATIENT
Start: 2020-03-27 | End: 2020-03-27 | Stop reason: SDUPTHER

## 2020-03-27 RX ORDER — CETIRIZINE HYDROCHLORIDE 10 MG/1
10 TABLET ORAL DAILY
Status: DISCONTINUED | OUTPATIENT
Start: 2020-03-27 | End: 2020-03-28 | Stop reason: HOSPADM

## 2020-03-27 RX ORDER — SODIUM CHLORIDE, SODIUM LACTATE, POTASSIUM CHLORIDE, CALCIUM CHLORIDE 600; 310; 30; 20 MG/100ML; MG/100ML; MG/100ML; MG/100ML
INJECTION, SOLUTION INTRAVENOUS CONTINUOUS
Status: DISCONTINUED | OUTPATIENT
Start: 2020-03-27 | End: 2020-03-27

## 2020-03-27 RX ORDER — SODIUM CHLORIDE 0.9 % (FLUSH) 0.9 %
10 SYRINGE (ML) INJECTION EVERY 12 HOURS SCHEDULED
Status: DISCONTINUED | OUTPATIENT
Start: 2020-03-27 | End: 2020-03-27 | Stop reason: HOSPADM

## 2020-03-27 RX ORDER — MIDAZOLAM HYDROCHLORIDE 1 MG/ML
INJECTION INTRAMUSCULAR; INTRAVENOUS PRN
Status: DISCONTINUED | OUTPATIENT
Start: 2020-03-27 | End: 2020-03-27 | Stop reason: SDUPTHER

## 2020-03-27 RX ORDER — MONTELUKAST SODIUM 10 MG/1
10 TABLET ORAL DAILY
Status: DISCONTINUED | OUTPATIENT
Start: 2020-03-28 | End: 2020-03-28 | Stop reason: HOSPADM

## 2020-03-27 RX ORDER — FENTANYL CITRATE 50 UG/ML
INJECTION, SOLUTION INTRAMUSCULAR; INTRAVENOUS PRN
Status: DISCONTINUED | OUTPATIENT
Start: 2020-03-27 | End: 2020-03-27 | Stop reason: SDUPTHER

## 2020-03-27 RX ORDER — SODIUM CHLORIDE 0.9 % (FLUSH) 0.9 %
10 SYRINGE (ML) INJECTION PRN
Status: DISCONTINUED | OUTPATIENT
Start: 2020-03-27 | End: 2020-03-28 | Stop reason: HOSPADM

## 2020-03-27 RX ORDER — HYDROCODONE BITARTRATE AND ACETAMINOPHEN 5; 325 MG/1; MG/1
1 TABLET ORAL EVERY 6 HOURS PRN
Qty: 12 TABLET | Refills: 0 | Status: SHIPPED | OUTPATIENT
Start: 2020-03-27 | End: 2020-03-30

## 2020-03-27 RX ORDER — DEXTROSE, SODIUM CHLORIDE, AND POTASSIUM CHLORIDE 5; .45; .15 G/100ML; G/100ML; G/100ML
INJECTION INTRAVENOUS CONTINUOUS
Status: DISCONTINUED | OUTPATIENT
Start: 2020-03-27 | End: 2020-03-28 | Stop reason: HOSPADM

## 2020-03-27 RX ORDER — OXYCODONE HYDROCHLORIDE AND ACETAMINOPHEN 5; 325 MG/1; MG/1
1 TABLET ORAL
Status: DISCONTINUED | OUTPATIENT
Start: 2020-03-27 | End: 2020-03-27 | Stop reason: HOSPADM

## 2020-03-27 RX ORDER — MORPHINE SULFATE 2 MG/ML
2 INJECTION, SOLUTION INTRAMUSCULAR; INTRAVENOUS EVERY 4 HOURS PRN
Status: DISCONTINUED | OUTPATIENT
Start: 2020-03-27 | End: 2020-03-28 | Stop reason: HOSPADM

## 2020-03-27 RX ORDER — SODIUM CHLORIDE 0.9 % (FLUSH) 0.9 %
10 SYRINGE (ML) INJECTION EVERY 12 HOURS SCHEDULED
Status: DISCONTINUED | OUTPATIENT
Start: 2020-03-27 | End: 2020-03-28 | Stop reason: HOSPADM

## 2020-03-27 RX ORDER — MONTELUKAST SODIUM 10 MG/1
10 TABLET ORAL ONCE
COMMUNITY
End: 2020-08-17

## 2020-03-27 RX ORDER — FENTANYL CITRATE 50 UG/ML
50 INJECTION, SOLUTION INTRAMUSCULAR; INTRAVENOUS EVERY 5 MIN PRN
Status: DISCONTINUED | OUTPATIENT
Start: 2020-03-27 | End: 2020-03-27 | Stop reason: HOSPADM

## 2020-03-27 RX ORDER — LIDOCAINE HYDROCHLORIDE 20 MG/ML
INJECTION, SOLUTION EPIDURAL; INFILTRATION; INTRACAUDAL; PERINEURAL PRN
Status: DISCONTINUED | OUTPATIENT
Start: 2020-03-27 | End: 2020-03-27 | Stop reason: SDUPTHER

## 2020-03-27 RX ORDER — SODIUM CHLORIDE 0.9 % (FLUSH) 0.9 %
10 SYRINGE (ML) INJECTION PRN
Status: DISCONTINUED | OUTPATIENT
Start: 2020-03-27 | End: 2020-03-27 | Stop reason: HOSPADM

## 2020-03-27 RX ORDER — LEVOTHYROXINE SODIUM 112 UG/1
112 TABLET ORAL DAILY
Status: DISCONTINUED | OUTPATIENT
Start: 2020-03-28 | End: 2020-03-28 | Stop reason: HOSPADM

## 2020-03-27 RX ORDER — IPRATROPIUM BROMIDE AND ALBUTEROL SULFATE 2.5; .5 MG/3ML; MG/3ML
1 SOLUTION RESPIRATORY (INHALATION) EVERY 4 HOURS PRN
Status: DISCONTINUED | OUTPATIENT
Start: 2020-03-27 | End: 2020-03-28 | Stop reason: HOSPADM

## 2020-03-27 RX ORDER — EPHEDRINE SULFATE/0.9% NACL/PF 50 MG/5 ML
SYRINGE (ML) INTRAVENOUS PRN
Status: DISCONTINUED | OUTPATIENT
Start: 2020-03-27 | End: 2020-03-27 | Stop reason: SDUPTHER

## 2020-03-27 RX ORDER — ONDANSETRON 2 MG/ML
4 INJECTION INTRAMUSCULAR; INTRAVENOUS
Status: DISCONTINUED | OUTPATIENT
Start: 2020-03-27 | End: 2020-03-27 | Stop reason: HOSPADM

## 2020-03-27 RX ORDER — KETOROLAC TROMETHAMINE 15 MG/ML
15 INJECTION, SOLUTION INTRAMUSCULAR; INTRAVENOUS EVERY 6 HOURS
Status: DISCONTINUED | OUTPATIENT
Start: 2020-03-27 | End: 2020-03-28 | Stop reason: HOSPADM

## 2020-03-27 RX ORDER — HYDROCODONE BITARTRATE AND ACETAMINOPHEN 5; 325 MG/1; MG/1
1 TABLET ORAL EVERY 6 HOURS PRN
Status: DISCONTINUED | OUTPATIENT
Start: 2020-03-27 | End: 2020-03-28 | Stop reason: HOSPADM

## 2020-03-27 RX ORDER — DEXAMETHASONE SODIUM PHOSPHATE 10 MG/ML
INJECTION INTRAMUSCULAR; INTRAVENOUS PRN
Status: DISCONTINUED | OUTPATIENT
Start: 2020-03-27 | End: 2020-03-27 | Stop reason: SDUPTHER

## 2020-03-27 RX ORDER — LOSARTAN POTASSIUM 100 MG/1
100 TABLET ORAL DAILY
Status: DISCONTINUED | OUTPATIENT
Start: 2020-03-28 | End: 2020-03-28 | Stop reason: HOSPADM

## 2020-03-27 RX ORDER — ASCORBIC ACID 500 MG
1000 TABLET ORAL DAILY
Status: DISCONTINUED | OUTPATIENT
Start: 2020-03-28 | End: 2020-03-28 | Stop reason: HOSPADM

## 2020-03-27 RX ORDER — PANTOPRAZOLE SODIUM 40 MG/1
40 TABLET, DELAYED RELEASE ORAL
Status: DISCONTINUED | OUTPATIENT
Start: 2020-03-28 | End: 2020-03-28 | Stop reason: HOSPADM

## 2020-03-27 RX ORDER — ONDANSETRON 4 MG/1
4 TABLET, FILM COATED ORAL EVERY 8 HOURS PRN
Qty: 20 TABLET | Refills: 0 | Status: SHIPPED | OUTPATIENT
Start: 2020-03-27 | End: 2020-08-17

## 2020-03-27 RX ORDER — PHENYLEPHRINE HCL IN 0.9% NACL 1 MG/10 ML
SYRINGE (ML) INTRAVENOUS PRN
Status: DISCONTINUED | OUTPATIENT
Start: 2020-03-27 | End: 2020-03-27 | Stop reason: SDUPTHER

## 2020-03-27 RX ORDER — ROCURONIUM BROMIDE 10 MG/ML
INJECTION, SOLUTION INTRAVENOUS PRN
Status: DISCONTINUED | OUTPATIENT
Start: 2020-03-27 | End: 2020-03-27

## 2020-03-27 RX ORDER — FENTANYL CITRATE 50 UG/ML
25 INJECTION, SOLUTION INTRAMUSCULAR; INTRAVENOUS EVERY 5 MIN PRN
Status: DISCONTINUED | OUTPATIENT
Start: 2020-03-27 | End: 2020-03-27 | Stop reason: HOSPADM

## 2020-03-27 RX ORDER — LIDOCAINE HYDROCHLORIDE 10 MG/ML
1 INJECTION, SOLUTION EPIDURAL; INFILTRATION; INTRACAUDAL; PERINEURAL
Status: DISCONTINUED | OUTPATIENT
Start: 2020-03-27 | End: 2020-03-27 | Stop reason: HOSPADM

## 2020-03-27 RX ORDER — ONDANSETRON 2 MG/ML
4 INJECTION INTRAMUSCULAR; INTRAVENOUS EVERY 6 HOURS PRN
Status: DISCONTINUED | OUTPATIENT
Start: 2020-03-27 | End: 2020-03-28 | Stop reason: HOSPADM

## 2020-03-27 RX ADMIN — SODIUM CHLORIDE, POTASSIUM CHLORIDE, SODIUM LACTATE AND CALCIUM CHLORIDE: 600; 310; 30; 20 INJECTION, SOLUTION INTRAVENOUS at 10:09

## 2020-03-27 RX ADMIN — LIDOCAINE HYDROCHLORIDE 100 MG: 20 INJECTION, SOLUTION EPIDURAL; INFILTRATION; INTRACAUDAL; PERINEURAL at 11:07

## 2020-03-27 RX ADMIN — Medication 50 MCG: at 11:32

## 2020-03-27 RX ADMIN — Medication 10 MG: at 11:20

## 2020-03-27 RX ADMIN — CEFAZOLIN SODIUM 2 G: 10 INJECTION, POWDER, FOR SOLUTION INTRAVENOUS at 11:20

## 2020-03-27 RX ADMIN — Medication 10 MG: at 11:26

## 2020-03-27 RX ADMIN — Medication 100 MCG: at 11:14

## 2020-03-27 RX ADMIN — SODIUM CHLORIDE, POTASSIUM CHLORIDE, SODIUM LACTATE AND CALCIUM CHLORIDE: 600; 310; 30; 20 INJECTION, SOLUTION INTRAVENOUS at 12:10

## 2020-03-27 RX ADMIN — Medication 10 MG: at 12:10

## 2020-03-27 RX ADMIN — MIDAZOLAM 2 MG: 1 INJECTION INTRAMUSCULAR; INTRAVENOUS at 11:00

## 2020-03-27 RX ADMIN — PROPOFOL 200 MG: 10 INJECTION, EMULSION INTRAVENOUS at 11:07

## 2020-03-27 RX ADMIN — Medication 100 MCG: at 11:07

## 2020-03-27 RX ADMIN — Medication 100 MG: at 11:07

## 2020-03-27 RX ADMIN — Medication 10 MG: at 11:56

## 2020-03-27 RX ADMIN — PROPOFOL 40 MG: 10 INJECTION, EMULSION INTRAVENOUS at 11:32

## 2020-03-27 RX ADMIN — DEXAMETHASONE SODIUM PHOSPHATE 10 MG: 10 INJECTION INTRAMUSCULAR; INTRAVENOUS at 11:20

## 2020-03-27 RX ADMIN — SODIUM CHLORIDE, POTASSIUM CHLORIDE, SODIUM LACTATE AND CALCIUM CHLORIDE: 600; 310; 30; 20 INJECTION, SOLUTION INTRAVENOUS at 11:00

## 2020-03-27 RX ADMIN — Medication 100 MCG: at 11:13

## 2020-03-27 RX ADMIN — Medication 5 MG: at 11:41

## 2020-03-27 RX ADMIN — Medication 5 MG: at 11:49

## 2020-03-27 RX ADMIN — HYDROCODONE BITARTRATE AND ACETAMINOPHEN 1 TABLET: 5; 325 TABLET ORAL at 19:57

## 2020-03-27 RX ADMIN — POTASSIUM CHLORIDE, DEXTROSE MONOHYDRATE AND SODIUM CHLORIDE: 150; 5; 450 INJECTION, SOLUTION INTRAVENOUS at 15:34

## 2020-03-27 RX ADMIN — ONDANSETRON 4 MG: 2 INJECTION, SOLUTION INTRAMUSCULAR; INTRAVENOUS at 12:13

## 2020-03-27 ASSESSMENT — PULMONARY FUNCTION TESTS
PIF_VALUE: 1
PIF_VALUE: 19
PIF_VALUE: 2
PIF_VALUE: 1
PIF_VALUE: 24
PIF_VALUE: 20
PIF_VALUE: 23
PIF_VALUE: 22
PIF_VALUE: 20
PIF_VALUE: 38
PIF_VALUE: 23
PIF_VALUE: 1
PIF_VALUE: 22
PIF_VALUE: 22
PIF_VALUE: 0
PIF_VALUE: 17
PIF_VALUE: 22
PIF_VALUE: 23
PIF_VALUE: 22
PIF_VALUE: 23
PIF_VALUE: 17
PIF_VALUE: 20
PIF_VALUE: 19
PIF_VALUE: 23
PIF_VALUE: 15
PIF_VALUE: 20
PIF_VALUE: 1
PIF_VALUE: 23
PIF_VALUE: 20
PIF_VALUE: 5
PIF_VALUE: 3
PIF_VALUE: 22
PIF_VALUE: 17
PIF_VALUE: 22
PIF_VALUE: 23
PIF_VALUE: 20
PIF_VALUE: 23
PIF_VALUE: 21
PIF_VALUE: 22
PIF_VALUE: 22
PIF_VALUE: 21
PIF_VALUE: 20
PIF_VALUE: 23
PIF_VALUE: 1
PIF_VALUE: 19
PIF_VALUE: 18
PIF_VALUE: 22
PIF_VALUE: 20
PIF_VALUE: 23
PIF_VALUE: 22
PIF_VALUE: 23
PIF_VALUE: 22
PIF_VALUE: 20
PIF_VALUE: 23
PIF_VALUE: 17
PIF_VALUE: 22
PIF_VALUE: 19
PIF_VALUE: 20
PIF_VALUE: 22
PIF_VALUE: 20
PIF_VALUE: 23
PIF_VALUE: 22
PIF_VALUE: 23
PIF_VALUE: 22
PIF_VALUE: 23
PIF_VALUE: 39
PIF_VALUE: 20
PIF_VALUE: 20
PIF_VALUE: 22
PIF_VALUE: 25
PIF_VALUE: 18
PIF_VALUE: 23
PIF_VALUE: 2
PIF_VALUE: 20
PIF_VALUE: 2
PIF_VALUE: 23
PIF_VALUE: 1
PIF_VALUE: 22
PIF_VALUE: 17
PIF_VALUE: 22
PIF_VALUE: 0
PIF_VALUE: 22
PIF_VALUE: 20
PIF_VALUE: 17
PIF_VALUE: 1
PIF_VALUE: 22
PIF_VALUE: 22
PIF_VALUE: 17
PIF_VALUE: 3
PIF_VALUE: 22
PIF_VALUE: 20
PIF_VALUE: 19
PIF_VALUE: 21
PIF_VALUE: 18
PIF_VALUE: 31
PIF_VALUE: 23
PIF_VALUE: 22

## 2020-03-27 ASSESSMENT — ENCOUNTER SYMPTOMS: SHORTNESS OF BREATH: 0

## 2020-03-27 ASSESSMENT — PAIN DESCRIPTION - LOCATION
LOCATION: NECK
LOCATION: NECK

## 2020-03-27 ASSESSMENT — PAIN DESCRIPTION - PAIN TYPE
TYPE: SURGICAL PAIN
TYPE: SURGICAL PAIN

## 2020-03-27 ASSESSMENT — PAIN DESCRIPTION - ONSET
ONSET: ON-GOING
ONSET: ON-GOING

## 2020-03-27 ASSESSMENT — PAIN SCALES - GENERAL
PAINLEVEL_OUTOF10: 1
PAINLEVEL_OUTOF10: 4
PAINLEVEL_OUTOF10: 3

## 2020-03-27 ASSESSMENT — PAIN DESCRIPTION - DESCRIPTORS
DESCRIPTORS: DISCOMFORT
DESCRIPTORS: DISCOMFORT

## 2020-03-27 ASSESSMENT — PAIN DESCRIPTION - FREQUENCY
FREQUENCY: INTERMITTENT
FREQUENCY: INTERMITTENT

## 2020-03-27 ASSESSMENT — PAIN - FUNCTIONAL ASSESSMENT
PAIN_FUNCTIONAL_ASSESSMENT: 0-10
PAIN_FUNCTIONAL_ASSESSMENT: ACTIVITIES ARE NOT PREVENTED
PAIN_FUNCTIONAL_ASSESSMENT: ACTIVITIES ARE NOT PREVENTED

## 2020-03-27 NOTE — CARE COORDINATION
Case Management Initial Discharge Plan  Lawyer Gerard,         [place] Risk              Risk of Unplanned Readmission:        6             Met with:patient to discuss discharge plans. Information verified: address, contacts, phone number, , insurance Yes  PCP: Janeen Neal MD  Date of last visit:     Insurance Provider: MMEVELIA    Discharge Planning  Current Residence:     Living Arrangements:  ANDREW Dennis has 2 stories/20 stairs to climb  Support Systems:  Family Members  Current Services PTA:    Supplier: none  Patient able to perform ADL's:Independent  DME used to aid ambulation prior to admission: none/during admissionnone    Potential Assistance Needed:  N/A    Pharmacy: [facility]   Potential Assistance Purchasing Medications:  No  Does patient want to participate in local refill/ meds to beds program?  Yes    Patient agreeable to home care: No  [place] of choice provided:  n/a      Type of Home Care Services:  None  Patient expects to be discharged to:  home    Prior SNF/Rehab Placement and Facility: no  Agreeable to SNF/Rehab: No  [place] of choice provided: n/a   Evaluation: no    Expected Discharge date:  20  Follow Up Appointment: Best Day/ Time:      Transportation provider: Family  Transportation arrangements needed for discharge: No    Discharge Plan: Met with patient. She lives alone. She states that her adult children come and go. Her plan is to return home at [place]. She anticipates no needs.  [address]        Electronically signed by SKYLA Gillis on 3/27/20 at 4:27 PM EDT

## 2020-03-27 NOTE — H&P
irregular heartbeat. GASTROINTESTINAL: Halitosis. GERD. Negative for nausea, vomiting, diarrhea, constipation, change in bowel habits, and abdominal pain. GENITOURINARY: Negative for difficulty of urination, burning with urination, and frequency. INTEGUMENT: Negative for rash, skin lesions, and easy bruising. HEMATOLOGIC/LYMPHATIC: Negative for swelling/edema. ALLERGIC/IMMUNOLOGIC: Negative for urticaria and itching. ENDOCRINE: Negative for increase in thirst, increase in urination, and heat or cold intolerance. MUSCULOSKELETAL: Negative joint pains, muscle aches, and swelling of joints. NEUROLOGICAL: One episode of dizziness and arm numbness in 01/2020. Negative for headaches, current dizziness, lightheadedness, current numbness, and tingling extremities. BEHAVIOR/PSYCH: Depression for a week in 01/2020. Negative for current depression and anxiety. Physical Exam:   /74   Pulse 67   Resp 16   Ht 5' 3\" (1.6 m)   Wt 183 lb 12.8 oz (83.4 kg)   LMP 02/17/2020   SpO2 100%   BMI 32.56 kg/m²   Patient's last menstrual period was 02/17/2020. No results for input(s): POCGLU in the last 72 hours. General Appearance:  Alert, well appearing, and in no acute distress. Obese. Mental status: Oriented to person, place, and time. Head: Normocephalic and atraumatic. Eye: No icterus, redness, pupils equal and reactive, extraocular eye movements intact, and conjunctiva clear. Ear: Hearing grossly intact. Nose: No drainage noted. Mouth: Mucous membranes moist.  Neck: Supple and no carotid bruits noted. Lungs: Bilateral equal air entry, clear to auscultation, no wheezing, rales or rhonchi, and normal effort. Cardiovascular: Normal rate, regular rhythm, no murmur, gallop, or rub. Genitourinary: No CVA tenderness. Abdomen: Soft, non-tender, non-distended, and active bowel sounds. Neurologic: Normal speech and cranial nerves II through XII grossly intact. Strength 5/5 bilaterally.   Skin: No gross lesions, rashes, bruising, or bleeding on exposed skin area. Extremities: Posterior tibial pulses 2+ bilaterally. No pedal edema. No calf tenderness with palpation. Psych: Normal affect. Investigations:       Laboratory Testing:  Recent Results   No results found for this or any previous visit (from the past 24 hour(s)). Recent Labs     02/03/20  0915      K 4.3      CO2 23   BUN 9   CREATININE 0.64   GLUCOSE 82         Imaging/Diagnostics:     Xr Thoracic Spine (3 Views)     Result Date: 2/7/2020  EXAMINATION: THREE XRAY VIEWS OF THE THORACIC SPINE 2/7/2020 2:38 pm COMPARISON: None. HISTORY: ORDERING SYSTEM PROVIDED HISTORY: Mid back pain TECHNOLOGIST PROVIDED HISTORY: MID BACK PAIN. OSTEOPOROSIS R/O COMPRESSION FRACTURE Reason for Exam: Pt eval for mid back pain Acuity: Unknown Type of Exam: Unknown FINDINGS: Lateral view demonstrates grossly normal vertebral body alignment. Moderate spurring at the mid and lower thoracic levels. No acute compression deformity identified. Study limited by mild bony demineralization. Moderate degenerative changes are noted about the thoracic spine. Bony demineralization limits the sensitivity of the evaluation for subtle compression fracture but no acute compression deformity is noted. If symptoms persist, consider MRI for further evaluation. Us Head Neck Soft Tissue Thyroid     Result Date: 2/11/2020  EXAMINATION: THYROID ULTRASOUND 2/11/2020 COMPARISON: None HISTORY: ORDERING SYSTEM PROVIDED HISTORY: Hyperparathyroidism ([address] FINDINGS: Right thyroid lobe:  2.5 x 0.5 x 0.4 cm Left thyroid lobe:  2.3 x 0.9 x 0.6 cm Isthmus:  2 mm Thyroid Gland: The thyroid gland is small. Nodules: No thyroid nodules are present. Cervical lymphadenopathy: No abnormal lymph nodes in the imaged portions of the neck. The thyroid gland is markedly atrophic. No nodules are identified. No obvious parathyroid tissue is demonstrated.       Us Renal Complete     Result Date: 2/11/2020  EXAMINATION: RETROPERITONEAL ULTRASOUND OF THE KIDNEYS AND URINARY BLADDER 2/11/2020 COMPARISON: None HISTORY: ORDERING SYSTEM PROVIDED HISTORY: Hyperparathyroidism [facility]) TECHNOLOGIST PROVIDED HISTORY: check kidneys due to new hyperparathyroidism FINDINGS: Kidneys: The right kidney measures 10.6 cm in length and the left kidney measures 9.9 cm in length. Increased renal cortical echogenicity can be seen the setting of medical renal disease. Nonshadowing or twinkling echogenic foci most likely vascular calcifications. Bladder: Prevoid bladder volume of 595 mL and postvoid residual of 16 mL. Jets not identified. No evidence of obstructive uropathy. Echogenic foci of both kidneys favor vascular calcifications but small calculi are also considered. Dexa Bone Density Axial Skeleton     Result Date: 2/6/2020  EXAMINATION: BONE DENSITOMETRY 2/6/2020 8:15 am TECHNIQUE: A bone density dual x-ray absorptiometry (DEXA) scan was performed of the lumbar spine and left hip  on a [employer] system. COMPARISON: None. HISTORY: ORDERING SYSTEM PROVIDED HISTORY: Hyperparathyroidism [facility]) TECHNOLOGIST PROVIDED HISTORY: hyperparathyroidism Is the patient pregnant?->No FINDINGS: LUMBAR SPINE: The bone mineral density in the lumbar spine including the L1-L4 levels is measured at 0.873 g/cm2, which corresponds to a T-score of -2.6 and a Z-score of -2.6. This is within the osteoporosis range by WHO criteria. LEFT HIP: The bone mineral density in the total hip is measured at 0.854 g/cm2 corresponding to a T-score of -1.2 and a Z-score of -1.1. This is within the osteopenia range by WHO criteria. The bone mineral density of the femoral neck is measured at 0.824 g/cm2 corresponding to a T-score of -1.5 and a Z-score of -1.1. This is within the osteopenia range by WHO criteria. Osteoporosis by WHO criteria. Patient is at high risk of fractures. Treatment should be considered. Nm Parathyroid W Spect/ct     Result Date: 2/12/2020  EXAMINATION: NUCLEAR MEDICINE PARATHYROID SCINTIGRAPHY WITH SPECT/CT. 2/11/2020 TECHNIQUE: After 18 Tc99m sestamibi was administered intravenously, immediate images of the neck were obtained in the anterior, NUÑEZ and CRUZITO projections. A survey image from the nose to the superior cardiac margin was also acquired. Delayed images of the neck were obtained in the same positions at 90 mins. SPECT/CT images of the neck were obtained following the immediate images. CT images were obtained for attenuation and localization purposes only. Multiplanar reformatted fused images were used for interpretation. COMPARISON: None. HISTORY: ORDERING SYSTEM PROVIDED HISTORY: Hyperparathyroidism ([address] TECHNOLOGIST PROVIDED HISTORY: Is the patient pregnant?->No Reason for Exam: Hyperparathyroidism, Pth Intact = 72.70  (15.0 - 65.0 pg/mL), Elevated serum calcium and elevated vitamin-D levels. FINDINGS: Immediate images: Homogeneous uptake within the thyroid gland is noted. No focal areas of increased radiotracer uptake are discernible on the planar images. SPECT images demonstrate a questionable small focus of asymmetric activity along the posterior inferior right thyroid lobe. This is demonstrated on the color images. Delayed images: Washout of activity from the thyroid gland is suboptimal.  No discrete asymmetric foci are noted. Physiologic activity is present in the salivary glands and heart. SPECT/CT: No focal areas of abnormal radiotracer activity identified. INCIDENTAL CT FINDINGS: None      Small focus of radiotracer along the posterior inferior aspect of the right lobe of the thyroid on the immediate tomography not definitely confirmed on the washout images. No definite CT correlate. However, findings are suspicious for small parathyroid adenoma given laboratory results. RECOMMENDATIONS: Consider diagnostic thin slice CT scanning of the neck in this patient.

## 2020-03-27 NOTE — OP NOTE
[id number] [facility] [address]                [address] [place] [address]                                OPERATIVE REPORT    PATIENT NAME: Alyson Pride                    :        1970  MED REC NO:   [id number]                             ROOM:       2008  ACCOUNT NO:   [de-identified]                           ADMIT DATE: 2020  PROVIDER:     Monster Chan    DATE OF PROCEDURE:  2020    PREOPERATIVE DIAGNOSES:  Hypercalcemia, hypoparathyroidism. POSTOPERATIVE DIAGNOSES:  Hypercalcemia, hypoparathyroidism, right  inferior parathyroid adenoma. PROCEDURE PERFORMED:  Neck exploration and removal of the right inferior  parathyroid adenoma. SURGEON:  Dr. Marcus Ariza. ASSISTANT:  Dr. John Vicente. ANESTHESIA:  General endotracheal.    ESTIMATED BLOOD LOSS:  5 mL. DRAINS:  None. INDICATIONS:  This is a 57-year-old female with the diagnoses of  hypercalcemia and elevated PTH level and workup has suggested the lesion  in question to be in the right neck. The patient comes in for neck  exploration. OPERATIVE PROCEDURE:  The patient was brought to the operating room and  placed in supine position. After induction of general anesthesia, the  patient's NIM electrodes were placed on the chest.  Neck was prepped and  draped in the usual sterile fashion. The patient's body was placed in a  reverse Trendelenburg position. A transverse incision site was marked  in the transverse manner in the lower neck. The incision was made and  the incision was taken down subcutaneously down to the platysma, which  was cut. Superior and inferior cervical flaps were developed. Cervical  fascia was next cut vertically and attention was devoted to the  patient's right neck. A strap muscle was dissected from the thyroid  gland so that we could dissect a space just lateral to the thyroid  gland. Examination revealed a prominent right inferior parathyroid  adenoma.   This

## 2020-03-27 NOTE — ANESTHESIA PRE PROCEDURE
Intravenous Once Cheryl Castillo MD           Allergies:  No Known Allergies    Problem List:    Patient Active Problem List   Diagnosis Code    Osteopenia M85.80    Mild intermittent asthma without complication Q72.75    Seasonal allergies J30.2    Gastroesophageal reflux disease K21.9    Vitamin D deficiency E55.9    SACHA (obstructive sleep apnea) G47.33    Other osteoporosis without current pathological fracture M81.8    Hypothyroidism due to Hashimoto's thyroiditis E03.8, E06.3    History of  section Z98.891    Fibroid uterus D25.9    Pelvic pressure in female R10.2    Urinary retention R33.9    Halitosis R19.6    Hyperparathyroidism ([address] E21.3       Past Medical History:        Diagnosis Date    Allergic rhinitis     Asthma     Excessive bleeding     Pt denies    GERD (gastroesophageal reflux disease)     Halitosis     Hypertension     Hypothyroidism     SACHA (obstructive sleep apnea)     Osteoporosis     Screen for colon cancer     Urinary retention     Uterine fibroid        Past Surgical History:        Procedure Laterality Date     SECTION      SINUS SURGERY  2018    UPPER GASTROINTESTINAL ENDOSCOPY N/A 2019    EGD BIOPSY performed by Sean Wang MD at [address] [place] [address] History:    Social History     Tobacco Use    Smoking status: Never Smoker    Smokeless tobacco: Never Used   Substance Use Topics    Alcohol use:  Yes     Alcohol/week: 0.0 standard drinks     Comment: social                                Counseling given: Not Answered      Vital Signs (Current):   Vitals:    20 0932 20 0933   BP: 120/70    Pulse: 57    Resp: 16    Temp: 97.9 °F (36.6 °C)    SpO2: 100%    Weight:  183 lb (83 kg)   Height:  5' 3\" (1.6 m)                                              BP Readings from Last 3 Encounters:   20 120/70   20 117/74   20 100/60       NPO Status: Time of last liquid consumption: 2300                        Time of last solid consumption: 2300                        Date of last liquid consumption: 03/26/20                        Date of last solid food consumption: 03/26/20    BMI:   Wt Readings from Last 3 Encounters:   03/27/20 183 lb (83 kg)   03/04/20 183 lb 12.8 oz (83.4 kg)   02/05/20 177 lb 6.4 oz (80.5 kg)     Body mass index is 32.42 kg/m². CBC:   Lab Results   Component Value Date    WBC 6.2 03/04/2020    RBC 4.03 03/04/2020    HGB 12.4 03/04/2020    HCT 38.2 03/04/2020    MCV 94.8 03/04/2020    RDW 12.7 03/04/2020     03/04/2020       CMP:   Lab Results   Component Value Date     03/04/2020    K 4.3 03/04/2020     03/04/2020    CO2 22 03/04/2020    BUN 10 03/04/2020    CREATININE 0.75 03/04/2020    GFRAA >60 03/04/2020    LABGLOM >60 03/04/2020    GLUCOSE 99 03/04/2020    PROT 8.2 01/21/2020    CALCIUM 9.9 03/04/2020    BILITOT 0.51 01/21/2020    ALKPHOS 89 01/21/2020    AST 20 01/21/2020    ALT 20 01/21/2020       POC Tests: No results for input(s): POCGLU, POCNA, POCK, POCCL, POCBUN, POCHEMO, POCHCT in the last 72 hours.     Coags:   Lab Results   Component Value Date    PROTIME 10.3 01/23/2020    INR 1.0 01/23/2020    APTT 26.2 01/23/2020       HCG (If Applicable):   Lab Results   Component Value Date    HCG NEGATIVE 08/01/2019        ABGs: No results found for: PHART, PO2ART, UGN2TXS, APP5RSI, BEART, H3JMJRSY     Type & Screen (If Applicable):  No results found for: LABABO, LABRH    Anesthesia Evaluation    Airway: Mallampati: I  TM distance: >3 FB   Neck ROM: full  Mouth opening: > = 3 FB Dental:          Pulmonary:   (+) sleep apnea:  asthma:     (-) shortness of breath                           Cardiovascular:        (-)  angina and murmur                Neuro/Psych:               GI/Hepatic/Renal:             Endo/Other:                     Abdominal:           Vascular:                                        Anesthesia Plan      general     ASA 2             Anesthetic plan and risks discussed with patient.                       Anderson Hwang MD   3/27/2020

## 2020-03-28 VITALS
OXYGEN SATURATION: 98 % | HEART RATE: 73 BPM | HEIGHT: 63 IN | WEIGHT: 183 LBS | DIASTOLIC BLOOD PRESSURE: 71 MMHG | SYSTOLIC BLOOD PRESSURE: 119 MMHG | TEMPERATURE: 97.9 F | BODY MASS INDEX: 32.43 KG/M2 | RESPIRATION RATE: 18 BRPM

## 2020-03-28 LAB
CALCIUM IONIZED: 1.28 MMOL/L (ref 1.13–1.33)
CALCIUM SERPL-MCNC: 9.4 MG/DL (ref 8.6–10.4)

## 2020-03-28 PROCEDURE — 82330 ASSAY OF CALCIUM: CPT

## 2020-03-28 PROCEDURE — 2500000003 HC RX 250 WO HCPCS: Performed by: STUDENT IN AN ORGANIZED HEALTH CARE EDUCATION/TRAINING PROGRAM

## 2020-03-28 PROCEDURE — 82310 ASSAY OF CALCIUM: CPT

## 2020-03-28 PROCEDURE — 96376 TX/PRO/DX INJ SAME DRUG ADON: CPT

## 2020-03-28 PROCEDURE — 2580000003 HC RX 258: Performed by: STUDENT IN AN ORGANIZED HEALTH CARE EDUCATION/TRAINING PROGRAM

## 2020-03-28 PROCEDURE — 96374 THER/PROPH/DIAG INJ IV PUSH: CPT

## 2020-03-28 PROCEDURE — G0378 HOSPITAL OBSERVATION PER HR: HCPCS

## 2020-03-28 PROCEDURE — 36415 COLL VENOUS BLD VENIPUNCTURE: CPT

## 2020-03-28 PROCEDURE — 6360000002 HC RX W HCPCS: Performed by: STUDENT IN AN ORGANIZED HEALTH CARE EDUCATION/TRAINING PROGRAM

## 2020-03-28 PROCEDURE — 6370000000 HC RX 637 (ALT 250 FOR IP): Performed by: STUDENT IN AN ORGANIZED HEALTH CARE EDUCATION/TRAINING PROGRAM

## 2020-03-28 RX ADMIN — KETOROLAC TROMETHAMINE 15 MG: 15 INJECTION, SOLUTION INTRAMUSCULAR; INTRAVENOUS at 04:27

## 2020-03-28 RX ADMIN — LEVOTHYROXINE SODIUM 112 MCG: 112 TABLET ORAL at 08:34

## 2020-03-28 RX ADMIN — MONTELUKAST 10 MG: 10 TABLET, FILM COATED ORAL at 08:34

## 2020-03-28 RX ADMIN — POTASSIUM CHLORIDE, DEXTROSE MONOHYDRATE AND SODIUM CHLORIDE: 150; 5; 450 INJECTION, SOLUTION INTRAVENOUS at 01:17

## 2020-03-28 RX ADMIN — LOSARTAN POTASSIUM 100 MG: 100 TABLET, FILM COATED ORAL at 08:34

## 2020-03-28 RX ADMIN — Medication 10 ML: at 08:40

## 2020-03-28 RX ADMIN — CETIRIZINE HYDROCHLORIDE 10 MG: 10 TABLET, FILM COATED ORAL at 08:34

## 2020-03-28 RX ADMIN — OXYCODONE HYDROCHLORIDE AND ACETAMINOPHEN 1000 MG: 500 TABLET ORAL at 08:34

## 2020-03-28 RX ADMIN — PANTOPRAZOLE SODIUM 40 MG: 40 TABLET, DELAYED RELEASE ORAL at 05:45

## 2020-03-28 RX ADMIN — KETOROLAC TROMETHAMINE 15 MG: 15 INJECTION, SOLUTION INTRAMUSCULAR; INTRAVENOUS at 08:34

## 2020-03-28 ASSESSMENT — PAIN SCALES - GENERAL
PAINLEVEL_OUTOF10: 3
PAINLEVEL_OUTOF10: 3

## 2020-03-28 NOTE — PROGRESS NOTES
Pt discharged to home in stable condition with [facility]s  Discharge instructions given  Discharge checklist reviewed and completed with pt   Pt denies having any further questions at this time  personal items given to patient at discharge  Patient/family state they have everything they were admitted with.

## 2020-03-28 NOTE — PROGRESS NOTES
CLINICAL PHARMACY NOTE: MEDS TO [address] Select Patient?: Yes  Total # of Prescriptions Filled: 2   The following medications were delivered to the patient:  · norco   · zofran   Total # of Interventions Completed: 0  Time Spent (min): 30    Additional Documentation:  PATIENT PICKED UP AT PHARMACY DUE TO NO M2B ON [address]

## 2020-03-29 NOTE — DISCHARGE SUMMARY
1000 MG CAPS  Take 900 mg by mouth             omeprazole 20 MG EC tablet  Take 20 mg by mouth daily             ondansetron (ZOFRAN) 4 MG tablet  Take 1 tablet by mouth every 8 hours as needed for Nausea or Vomiting             vitamin C (ASCORBIC ACID) 500 MG tablet  Take 1,000 mg by mouth daily                  HPI and Hospital Course: The patient was monitored post op following neck exploration and parathyroidectomy. All labs and images were reviewed. The patient recovered well as expected. She had no signs or symptoms of hypocalcemia. At time of discharge, patient was tolerating pain with PO analgesics, she was taking in a regular diet, ambulatory, and voiding. Patient is stable for discharge.        Clifton Flattery, DO

## 2020-03-30 ENCOUNTER — CARE COORDINATION (OUTPATIENT)
Dept: CASE MANAGEMENT | Age: 50
End: 2020-03-30

## 2020-03-30 LAB — SURGICAL PATHOLOGY REPORT: NORMAL

## 2020-03-31 ENCOUNTER — CARE COORDINATION (OUTPATIENT)
Dept: OTHER | Facility: CLINIC | Age: 50
End: 2020-03-31

## 2020-04-09 ENCOUNTER — CARE COORDINATION (OUTPATIENT)
Dept: OTHER | Facility: CLINIC | Age: 50
End: 2020-04-09

## 2020-08-17 ENCOUNTER — OFFICE VISIT (OUTPATIENT)
Dept: FAMILY MEDICINE CLINIC | Age: 50
End: 2020-08-17
Payer: COMMERCIAL

## 2020-08-17 VITALS
BODY MASS INDEX: 33.38 KG/M2 | OXYGEN SATURATION: 100 % | WEIGHT: 188.4 LBS | DIASTOLIC BLOOD PRESSURE: 90 MMHG | HEART RATE: 74 BPM | TEMPERATURE: 97.7 F | HEIGHT: 63 IN | SYSTOLIC BLOOD PRESSURE: 130 MMHG

## 2020-08-17 PROBLEM — N95.1 PERIMENOPAUSAL: Status: ACTIVE | Noted: 2020-08-17

## 2020-08-17 PROBLEM — R33.9 URINARY RETENTION: Status: RESOLVED | Noted: 2019-07-08 | Resolved: 2020-08-17

## 2020-08-17 PROBLEM — E21.3 HYPERPARATHYROIDISM (HCC): Status: RESOLVED | Noted: 2020-02-05 | Resolved: 2020-08-17

## 2020-08-17 PROBLEM — R10.2 PELVIC PRESSURE IN FEMALE: Status: RESOLVED | Noted: 2019-07-08 | Resolved: 2020-08-17

## 2020-08-17 PROBLEM — E83.52 HYPERCALCEMIA: Status: RESOLVED | Noted: 2020-08-17 | Resolved: 2020-08-17

## 2020-08-17 PROBLEM — E83.52 HYPERCALCEMIA: Status: ACTIVE | Noted: 2020-08-17

## 2020-08-17 PROBLEM — Z00.00 ANNUAL PHYSICAL EXAM: Status: ACTIVE | Noted: 2020-08-17

## 2020-08-17 PROBLEM — M70.51 PES ANSERINUS BURSITIS OF RIGHT KNEE: Status: ACTIVE | Noted: 2020-08-17

## 2020-08-17 PROBLEM — E88.89 HOFFA'S DISEASE (HCC): Status: RESOLVED | Noted: 2020-08-17 | Resolved: 2020-08-17

## 2020-08-17 PROBLEM — I10 ESSENTIAL HYPERTENSION: Status: ACTIVE | Noted: 2020-08-17

## 2020-08-17 PROBLEM — E03.9 ACQUIRED HYPOTHYROIDISM: Status: ACTIVE | Noted: 2020-08-17

## 2020-08-17 PROBLEM — E88.89 HOFFA'S DISEASE (HCC): Status: ACTIVE | Noted: 2020-08-17

## 2020-08-17 PROCEDURE — 99396 PREV VISIT EST AGE 40-64: CPT | Performed by: FAMILY MEDICINE

## 2020-08-17 RX ORDER — LOSARTAN POTASSIUM 50 MG/1
50 TABLET ORAL DAILY
Qty: 90 TABLET | Refills: 1 | Status: SHIPPED | OUTPATIENT
Start: 2020-08-17 | End: 2020-08-17 | Stop reason: CLARIF

## 2020-08-17 RX ORDER — BISOPROLOL FUMARATE 5 MG/1
5 TABLET ORAL DAILY
Qty: 30 TABLET | Refills: 1 | Status: SHIPPED | OUTPATIENT
Start: 2020-08-17 | End: 2020-09-16 | Stop reason: SDUPTHER

## 2020-08-17 RX ORDER — LOSARTAN POTASSIUM 100 MG/1
100 TABLET ORAL DAILY
Qty: 90 TABLET | Refills: 1 | Status: SHIPPED | OUTPATIENT
Start: 2020-08-17 | End: 2020-09-16 | Stop reason: SINTOL

## 2020-08-17 ASSESSMENT — ENCOUNTER SYMPTOMS
COLOR CHANGE: 0
SINUS PRESSURE: 0
VOMITING: 0
EYE PAIN: 0
TROUBLE SWALLOWING: 0
VOICE CHANGE: 0
BACK PAIN: 0
ANAL BLEEDING: 0
RECTAL PAIN: 0
CONSTIPATION: 0
NAUSEA: 0
CHEST TIGHTNESS: 0
ABDOMINAL PAIN: 0
COUGH: 0
ABDOMINAL DISTENTION: 0
EYE DISCHARGE: 0
DIARRHEA: 0
BLOOD IN STOOL: 0
EYE REDNESS: 0
SHORTNESS OF BREATH: 0

## 2020-08-17 ASSESSMENT — PATIENT HEALTH QUESTIONNAIRE - PHQ9
1. LITTLE INTEREST OR PLEASURE IN DOING THINGS: 0
SUM OF ALL RESPONSES TO PHQ QUESTIONS 1-9: 0
2. FEELING DOWN, DEPRESSED OR HOPELESS: 0
SUM OF ALL RESPONSES TO PHQ9 QUESTIONS 1 & 2: 0
SUM OF ALL RESPONSES TO PHQ QUESTIONS 1-9: 0

## 2020-08-17 NOTE — PROGRESS NOTES
Subjective:      Patient ID: Alvin Tristan is a 44 y.o. female. HPI States has had Cardiology work up for HTN as well as Chest pains.  has been taking losartan on and off for HTN, recently BP has been up despite the 100 mg daily. States increased stress. Has H/O Sleep study in the past. No SACHA. Had a study that was not conclusive. But does has freq awakenings. States tried CPAP for a few days and have diff with mask so stopped using. States after losing weight with diet and exercise sleep was better. States stopped taking weight loss pills that she had  been taking - was working for her weight loss. Stopped exercising regularly , states gained weight back. Had cologuard 2 years ago- was neg. EGD was done for halitosis and GERD- was WNL. Review of Systems   Constitutional: Negative for activity change, appetite change and fatigue. HENT: Negative for dental problem, ear pain, hearing loss, postnasal drip, sinus pressure, sneezing, tinnitus, trouble swallowing and voice change. Eyes: Negative for pain, discharge, redness and visual disturbance. Respiratory: Negative for cough, chest tightness and shortness of breath. Cardiovascular: Negative for chest pain, palpitations and leg swelling. Gastrointestinal: Negative for abdominal distention, abdominal pain, anal bleeding, blood in stool, constipation, diarrhea, nausea, rectal pain and vomiting. Endocrine: Negative for cold intolerance, heat intolerance, polydipsia, polyphagia and polyuria. Genitourinary: Negative for decreased urine volume, difficulty urinating, dyspareunia, dysuria, enuresis, flank pain, frequency, genital sores, hematuria, menstrual problem, pelvic pain, urgency, vaginal bleeding and vaginal discharge. Musculoskeletal: Negative for arthralgias, back pain, gait problem, joint swelling, myalgias, neck pain and neck stiffness. Skin: Negative for color change, pallor and rash.    Allergic/Immunologic: Negative for environmental allergies, food allergies and immunocompromised state. Neurological: Negative for dizziness, tremors, seizures, syncope, facial asymmetry, speech difficulty, weakness, light-headedness, numbness and headaches. Hematological: Negative for adenopathy. Does not bruise/bleed easily. Psychiatric/Behavioral: Negative for agitation, behavioral problems, confusion, decreased concentration, sleep disturbance and suicidal ideas. The patient is not nervous/anxious. Objective:   Physical Exam  Constitutional:       General: She is not in acute distress. HENT:      Head: Normocephalic and atraumatic. Right Ear: External ear normal.      Left Ear: External ear normal.   Eyes:      Conjunctiva/sclera: Conjunctivae normal.      Pupils: Pupils are equal, round, and reactive to light. Neck:      Musculoskeletal: Normal range of motion. Thyroid: No thyromegaly. Trachea: No tracheal deviation. Cardiovascular:      Rate and Rhythm: Normal rate and regular rhythm. Heart sounds: No murmur. No friction rub. No gallop. Pulmonary:      Effort: No respiratory distress. Breath sounds: No stridor. No wheezing or rales. Chest:      Chest wall: No tenderness. Abdominal:      General: Bowel sounds are normal. There is no distension. Palpations: Abdomen is soft. Tenderness: There is no abdominal tenderness. There is no rebound. Musculoskeletal: Normal range of motion. Lymphadenopathy:      Cervical: No cervical adenopathy. Skin:     General: Skin is warm. Coloration: Skin is not pale. Findings: No erythema or rash. Neurological:      Mental Status: She is alert and oriented to person, place, and time. Cranial Nerves: No cranial nerve deficit. Motor: No abnormal muscle tone. Deep Tendon Reflexes: Reflexes normal.         Assessment:       Diagnosis Orders   1.  Essential hypertension  bisoprolol (ZEBETA) 5 MG tablet    losartan (COZAAR) 100 MG tablet    DISCONTINUED: losartan (COZAAR) 50 MG tablet   2. Screening, lipid  Lipid Panel   3. History of parathyroidectomy  PTH, Intact   4. Hypervitaminosis D  Vitamin D 25 Hydroxy   5. Obesity, unspecified classification, unspecified obesity type, unspecified whether serious comorbidity present  Insulin, Free   6. Vitamin B deficiency  Vitamin B12    Folate    CBC   7. Perimenopausal     8. Uterine leiomyoma, unspecified location     9. S/P parathyroidectomy     10. Acquired hypothyroidism  T3, Free    T4, Free    TSH without Reflex   11.  Annual physical exam           Plan:      Orders Placed This Encounter   Procedures    PTH, Intact    Vitamin D 25 Hydroxy    Insulin, Free    Lipid Panel    Vitamin B12    Folate    CBC    T3, Free    T4, Free    TSH without Reflex       Outpatient Encounter Medications as of 8/17/2020   Medication Sig Dispense Refill    bisoprolol (ZEBETA) 5 MG tablet Take 1 tablet by mouth daily 30 tablet 1    losartan (COZAAR) 100 MG tablet Take 1 tablet by mouth daily 90 tablet 1    Omega-3 1000 MG CAPS Take 900 mg by mouth      vitamin C (ASCORBIC ACID) 500 MG tablet Take 1,000 mg by mouth daily      fexofenadine (ALLEGRA) 180 MG tablet Take 180 mg by mouth daily      levothyroxine (SYNTHROID) 112 MCG tablet Take 1 tablet by mouth daily 30 tablet 5    albuterol sulfate HFA (PROAIR HFA) 108 (90 BASE) MCG/ACT inhaler Inhale 2 puffs into the lungs every 6 hours as needed for Wheezing 3 Inhaler 3    [DISCONTINUED] losartan (COZAAR) 50 MG tablet Take 1 tablet by mouth daily 90 tablet 1    [DISCONTINUED] montelukast (SINGULAIR) 10 MG tablet Take 10 mg by mouth once      [DISCONTINUED] ondansetron (ZOFRAN) 4 MG tablet Take 1 tablet by mouth every 8 hours as needed for Nausea or Vomiting (Patient not taking: Reported on 8/17/2020) 20 tablet 0    [DISCONTINUED] omeprazole 20 MG EC tablet Take 20 mg by mouth daily      [DISCONTINUED] losartan (COZAAR) 50 MG tablet Take 2 tablets by mouth daily (Patient not taking: Reported on 8/17/2020) 60 tablet 0     No facility-administered encounter medications on file as of 8/17/2020.             Angie De León MD

## 2020-08-18 ENCOUNTER — HOSPITAL ENCOUNTER (OUTPATIENT)
Age: 50
Setting detail: SPECIMEN
Discharge: HOME | End: 2020-08-18
Payer: COMMERCIAL

## 2020-08-18 LAB
CHOLESTEROL/HDL RATIO: 3
CHOLESTEROL: 168 MG/DL
FOLATE: >20 NG/ML
HCT VFR BLD CALC: 41.6 % (ref 36.3–47.1)
HDLC SERPL-MCNC: 56 MG/DL
HEMOGLOBIN: 12.8 G/DL (ref 11.9–15.1)
LDL CHOLESTEROL: 95 MG/DL (ref 0–130)
MCH RBC QN AUTO: 29 PG (ref 25.2–33.5)
MCHC RBC AUTO-ENTMCNC: 30.8 G/DL (ref 28.4–34.8)
MCV RBC AUTO: 94.1 FL (ref 82.6–102.9)
NRBC AUTOMATED: 0 PER 100 WBC
PDW BLD-RTO: 12.1 % (ref 11.8–14.4)
PLATELET # BLD: 287 K/UL (ref 138–453)
PMV BLD AUTO: 10.3 FL (ref 8.1–13.5)
PTH INTACT: 32.74 PG/ML (ref 15–65)
RBC # BLD: 4.42 M/UL (ref 3.95–5.11)
T3 FREE: 2.34 PG/ML (ref 2.02–4.43)
THYROXINE, FREE: 1.31 NG/DL (ref 0.93–1.7)
TRIGL SERPL-MCNC: 87 MG/DL
TSH SERPL DL<=0.05 MIU/L-ACNC: 2.03 MIU/L (ref 0.3–5)
VITAMIN B-12: 434 PG/ML (ref 232–1245)
VITAMIN D 25-HYDROXY: 41.9 NG/ML (ref 30–100)
VLDLC SERPL CALC-MCNC: NORMAL MG/DL (ref 1–30)
WBC # BLD: 6.9 K/UL (ref 3.5–11.3)

## 2020-08-21 LAB
INSULIN FREE: 12 UIU/ML (ref 3–19)
INSULIN: 16 UIU/ML (ref 3–19)

## 2020-09-16 ENCOUNTER — OFFICE VISIT (OUTPATIENT)
Dept: FAMILY MEDICINE CLINIC | Age: 50
End: 2020-09-16
Payer: COMMERCIAL

## 2020-09-16 VITALS
BODY MASS INDEX: 34.02 KG/M2 | HEIGHT: 63 IN | DIASTOLIC BLOOD PRESSURE: 77 MMHG | SYSTOLIC BLOOD PRESSURE: 110 MMHG | WEIGHT: 192 LBS | TEMPERATURE: 98 F | OXYGEN SATURATION: 98 % | HEART RATE: 54 BPM

## 2020-09-16 PROBLEM — E66.9 OBESITY: Status: ACTIVE | Noted: 2020-09-16

## 2020-09-16 PROBLEM — Z00.00 ANNUAL PHYSICAL EXAM: Status: RESOLVED | Noted: 2020-08-17 | Resolved: 2020-09-16

## 2020-09-16 LAB
AVERAGE GLUCOSE: NORMAL
HBA1C MFR BLD: 6 %

## 2020-09-16 PROCEDURE — 36415 COLL VENOUS BLD VENIPUNCTURE: CPT | Performed by: FAMILY MEDICINE

## 2020-09-16 PROCEDURE — 99214 OFFICE O/P EST MOD 30 MIN: CPT | Performed by: FAMILY MEDICINE

## 2020-09-16 RX ORDER — IBANDRONATE SODIUM 150 MG/1
150 TABLET, FILM COATED ORAL
Qty: 3 TABLET | Refills: 3 | Status: SHIPPED | OUTPATIENT
Start: 2020-09-16 | End: 2020-12-16 | Stop reason: ALTCHOICE

## 2020-09-16 RX ORDER — IBANDRONATE SODIUM 150 MG/1
150 TABLET, FILM COATED ORAL
Qty: 30 TABLET | Refills: 3
Start: 2020-09-16 | End: 2020-12-16

## 2020-09-16 RX ORDER — BISOPROLOL FUMARATE 5 MG/1
5 TABLET ORAL DAILY
Qty: 90 TABLET | Refills: 0 | Status: SHIPPED | OUTPATIENT
Start: 2020-09-16 | End: 2020-12-16 | Stop reason: SDUPTHER

## 2020-09-16 ASSESSMENT — PATIENT HEALTH QUESTIONNAIRE - PHQ9
SUM OF ALL RESPONSES TO PHQ QUESTIONS 1-9: 0
1. LITTLE INTEREST OR PLEASURE IN DOING THINGS: 0
2. FEELING DOWN, DEPRESSED OR HOPELESS: 0
SUM OF ALL RESPONSES TO PHQ9 QUESTIONS 1 & 2: 0
SUM OF ALL RESPONSES TO PHQ QUESTIONS 1-9: 0

## 2020-09-16 ASSESSMENT — ENCOUNTER SYMPTOMS
TROUBLE SWALLOWING: 0
VOMITING: 0
ANAL BLEEDING: 0
EYE DISCHARGE: 0
EYE REDNESS: 0
DIARRHEA: 0
COUGH: 0
ABDOMINAL PAIN: 0
CONSTIPATION: 0
NAUSEA: 0
EYE PAIN: 0
SINUS PRESSURE: 0
ABDOMINAL DISTENTION: 0
RECTAL PAIN: 0
SHORTNESS OF BREATH: 0
BACK PAIN: 0
COLOR CHANGE: 0
VOICE CHANGE: 0
CHEST TIGHTNESS: 0
BLOOD IN STOOL: 0

## 2020-09-16 NOTE — PROGRESS NOTES
Subjective:      Patient ID: China Potts is a 44 y.o. female. HPI  Her for follow up of HTN. States has been doing well. BP runs higher at home but here her BP was WNL only on Bisoprolol 5 mg daily. She states BP ranges from high to low within a few minutes of each other when she is checking BP at home and is concerned about this. Concerns about pheochromocytoma. When I questioned her about stress and anxiety she denied this stating she is not stressed at all. I recommended an anti anxiety med for a short time to see if that would indeed even out her BP readings and she is not interested at this time.  stopped Losartan as she was having side effects of Palpitations , restlessness and sweats when she took it one last day. She had been started on Boniva for osteopenia in the past but as her calcium was up she had held this, now that her calcium levels are back to [facility] after parathyroid surgery, will restart. GERD sx are under control at this time , if she cannot tolerate boniva , will try Prolia. States no problems with Asthma at this time. Review of Systems   Constitutional: Negative for activity change, appetite change and fatigue. HENT: Negative for dental problem, ear pain, hearing loss, postnasal drip, sinus pressure, sneezing, tinnitus, trouble swallowing and voice change. Eyes: Negative for pain, discharge, redness and visual disturbance. Respiratory: Negative for cough, chest tightness and shortness of breath. Cardiovascular: Negative for chest pain, palpitations and leg swelling. Gastrointestinal: Negative for abdominal distention, abdominal pain, anal bleeding, blood in stool, constipation, diarrhea, nausea, rectal pain and vomiting. Endocrine: Negative for cold intolerance, heat intolerance, polydipsia, polyphagia and polyuria.    Genitourinary: Negative for decreased urine volume, difficulty urinating, dyspareunia, dysuria, enuresis, flank pain, frequency, genital sores, hematuria, menstrual problem, pelvic pain, urgency, vaginal bleeding and vaginal discharge. Musculoskeletal: Negative for arthralgias, back pain, gait problem, joint swelling, myalgias, neck pain and neck stiffness. Skin: Negative for color change, pallor and rash. Allergic/Immunologic: Negative for environmental allergies, food allergies and immunocompromised state. Neurological: Negative for dizziness, tremors, seizures, syncope, facial asymmetry, speech difficulty, weakness, light-headedness, numbness and headaches. Hematological: Negative for adenopathy. Does not bruise/bleed easily. Psychiatric/Behavioral: Negative for agitation, behavioral problems, confusion, decreased concentration, sleep disturbance and suicidal ideas. The patient is not nervous/anxious. Objective:   Physical Exam  Constitutional:       General: She is not in acute distress. HENT:      Head: Normocephalic and atraumatic. Right Ear: External ear normal.      Left Ear: External ear normal.   Eyes:      Conjunctiva/sclera: Conjunctivae normal.      Pupils: Pupils are equal, round, and reactive to light. Neck:      Musculoskeletal: Normal range of motion. Thyroid: No thyromegaly. Trachea: No tracheal deviation. Cardiovascular:      Rate and Rhythm: Normal rate and regular rhythm. Heart sounds: No murmur. No friction rub. No gallop. Pulmonary:      Effort: No respiratory distress. Breath sounds: No stridor. No wheezing or rales. Chest:      Chest wall: No tenderness. Abdominal:      General: Bowel sounds are normal. There is no distension. Palpations: Abdomen is soft. Tenderness: There is no abdominal tenderness. There is no rebound. Musculoskeletal: Normal range of motion. Lymphadenopathy:      Cervical: No cervical adenopathy. Skin:     General: Skin is warm. Coloration: Skin is not pale. Findings: No erythema or rash.    Neurological:      Mental Status: She is alert and oriented to person, place, and time. Cranial Nerves: No cranial nerve deficit. Motor: No abnormal muscle tone. Deep Tendon Reflexes: Reflexes normal.         Assessment:       Diagnosis Orders   1. Hyperglycemia  Hemoglobin A1C   2. Need for influenza vaccination  INFLUENZA, QUADV, 3 YRS AND OLDER, IM PF, PREFILL SYR OR SDV, 0.5ML (AFLURIA QUADV, PF)   3. Osteopenia of multiple sites     4. Mild intermittent asthma without complication     5. Seasonal allergies     6. Gastroesophageal reflux disease, esophagitis presence not specified     7. Vitamin D deficiency     8. SACHA (obstructive sleep apnea)     9. Hypothyroidism due to Hashimoto's thyroiditis     10. S/P parathyroidectomy     11. Essential hypertension  bisoprolol (ZEBETA) 5 MG tablet   12.  Obesity, unspecified classification, unspecified obesity type, unspecified whether serious comorbidity present           Plan:      Orders Placed This Encounter   Procedures    INFLUENZA, QUADV, 3 YRS AND OLDER, IM PF, PREFILL SYR OR SDV, 0.5ML (AFLURIA QUADV, PF)    Hemoglobin A1C       Outpatient Encounter Medications as of 9/16/2020   Medication Sig Dispense Refill    bisoprolol (ZEBETA) 5 MG tablet Take 1 tablet by mouth daily 90 tablet 0    Omega-3 1000 MG CAPS Take 900 mg by mouth      vitamin C (ASCORBIC ACID) 500 MG tablet Take 1,000 mg by mouth daily      fexofenadine (ALLEGRA) 180 MG tablet Take 180 mg by mouth daily      levothyroxine (SYNTHROID) 112 MCG tablet Take 1 tablet by mouth daily 30 tablet 5    albuterol sulfate HFA (PROAIR HFA) 108 (90 BASE) MCG/ACT inhaler Inhale 2 puffs into the lungs every 6 hours as needed for Wheezing 3 Inhaler 3    [DISCONTINUED] bisoprolol (ZEBETA) 5 MG tablet Take 1 tablet by mouth daily 30 tablet 1    [DISCONTINUED] losartan (COZAAR) 100 MG tablet Take 1 tablet by mouth daily (Patient not taking: Reported on 9/16/2020) 90 tablet 1     No facility-administered encounter medications on file as of 9/16/2020.             Reji Shelton MD

## 2020-09-17 RX ORDER — PAROXETINE 10 MG/1
10 TABLET, FILM COATED ORAL EVERY MORNING
Qty: 90 TABLET | Refills: 0 | Status: SHIPPED | OUTPATIENT
Start: 2020-09-17 | End: 2020-12-16 | Stop reason: SDUPTHER

## 2020-09-28 ENCOUNTER — HOSPITAL ENCOUNTER (OUTPATIENT)
Dept: PHYSICAL THERAPY | Facility: CLINIC | Age: 50
Setting detail: THERAPIES SERIES
Discharge: HOME | End: 2020-09-28
Payer: COMMERCIAL

## 2020-09-28 ENCOUNTER — OFFICE VISIT (OUTPATIENT)
Dept: ORTHOPEDIC SURGERY | Age: 50
End: 2020-09-28
Payer: COMMERCIAL

## 2020-09-28 VITALS
TEMPERATURE: 97.2 F | WEIGHT: 192 LBS | HEART RATE: 48 BPM | SYSTOLIC BLOOD PRESSURE: 102 MMHG | HEIGHT: 63 IN | DIASTOLIC BLOOD PRESSURE: 70 MMHG | BODY MASS INDEX: 34.02 KG/M2

## 2020-09-28 PROCEDURE — 99203 OFFICE O/P NEW LOW 30 MIN: CPT | Performed by: ORTHOPAEDIC SURGERY

## 2020-09-28 PROCEDURE — 97116 GAIT TRAINING THERAPY: CPT

## 2020-09-28 PROCEDURE — 97161 PT EVAL LOW COMPLEX 20 MIN: CPT

## 2020-09-28 ASSESSMENT — ENCOUNTER SYMPTOMS
SHORTNESS OF BREATH: 0
COLOR CHANGE: 0
EYE DISCHARGE: 0
NAUSEA: 0
COUGH: 0
APNEA: 0
VOMITING: 0
ABDOMINAL DISTENTION: 0
CHEST TIGHTNESS: 0
CONSTIPATION: 0
SORE THROAT: 0
DIARRHEA: 0
FACIAL SWELLING: 0
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
## 2020-09-28 NOTE — PROGRESS NOTES
[address] [facility]  [address] [place] [id number]  Dept: [phone]  Dept Fax: [phone]          Left Knee - New Patient     Subjective:     Chief Complaint   Patient presents with    Knee Injury     left knee pain DOI- 9/27/20     HPI:     Alex Yao presents today for left knee pain. She comes to the office today using a wheel chair which is in good repair. The pain has been present for 1 day. The patient recalls a specific injury where the knee popped when she fell out of a boat [place]. She felt the pop on the lateral aspect of her knee. Her pain is mainly within the knee, and she notices it with range of motion. The instructor instructed her to keep her ankle anchored into the boat so she does not fall out but she fell out of the boat with her ankle anchored, which resulted in a pop and pain in the knee. The patient was unable to bear weight since the injury, with a painful drive home. The patient has tried Aleve, rest, ice, compression and elevation with mild improvement. The pain is now described as Stabbing  and Sharp . There is  pain on weight bearing. The knee has swelled. There is  painful popping and clicking. The knee has not caught or locked up. The knee has not given out. It is  stiff upon arising from sitting. It is  painful to go up and down stairs and sit for a prolonged time. The patient has not had a cortisone injection. The patient has not tried a lubrication injection. The patient has not tried physical therapy. The patient has not had surgery. ROS:   Review of Systems   Constitutional: Negative for activity change and fatigue. HENT: Negative for facial swelling and sore throat. Eyes: Negative for discharge. Respiratory: Negative for apnea, cough, chest tightness and shortness of breath.     Cardiovascular: Negative for chest pain, palpitations and leg swelling. Gastrointestinal: Negative for abdominal distention, constipation, diarrhea, nausea and vomiting. Musculoskeletal: Positive for arthralgias, gait problem, joint swelling and myalgias. Skin: Negative for color change and rash. Past Medical History:    Past Medical History:   Diagnosis Date    Allergic rhinitis     Asthma     Excessive bleeding     Pt denies    GERD (gastroesophageal reflux disease)     Halitosis     Hypertension     Hypothyroidism     Mild intermittent asthma without complication 3/56/6267    Obesity 2020    SACHA (obstructive sleep apnea)     Osteoporosis     Screen for colon cancer     Urinary retention     Uterine fibroid        Past Surgical History:    Past Surgical History:   Procedure Laterality Date     SECTION      PARATHYROID GLAND SURGERY Right 3/27/2020    NECK EXPLORATION AND REMOVAL OF PARATHYROID ADENOMA performed by Moe Gaona MD at [facility] 9  2018    UPPER GASTROINTESTINAL ENDOSCOPY N/A 2019    EGD BIOPSY performed by Lamonte Fitzgerald MD at [address] [place] [address]       CurrentMedications:   Current Outpatient Medications   Medication Sig Dispense Refill    PARoxetine (PAXIL) 10 MG tablet Take 1 tablet by mouth every morning 90 tablet 0    bisoprolol (ZEBETA) 5 MG tablet Take 1 tablet by mouth daily 90 tablet 0    ibandronate (BONIVA) 150 MG tablet Take 1 tablet by mouth every 30 days Take one (1) tablet once per month in the morning with a full glass of water, on an empty stomach, and do not take anything else by mouth or lie down for the next 30 minutes.  30 tablet 3    ibandronate (BONIVA) 150 MG tablet Take 1 tablet by mouth every 30 days 3 tablet 3    Omega-3 1000 MG CAPS Take 900 mg by mouth      vitamin C (ASCORBIC ACID) 500 MG tablet Take 1,000 mg by mouth daily      fexofenadine (ALLEGRA) 180 MG tablet Take 180 mg by mouth daily      levothyroxine (SYNTHROID) 112 MCG tablet Take 1 tablet by mouth daily 30 tablet 5    albuterol sulfate HFA (PROAIR HFA) 108 (90 BASE) MCG/ACT inhaler Inhale 2 puffs into the lungs every 6 hours as needed for Wheezing 3 Inhaler 3     No current facility-administered medications for this visit. Allergies:    Losartan    Social History:   Social History     Socioeconomic History    Marital status:      Spouse name: None    Number of children: None    Years of education: None    Highest education level: None   Occupational History    None   Social Needs    Financial resource strain: None    Food insecurity     Worry: None     Inability: None    Transportation needs     Medical: None     Non-medical: None   Tobacco Use    Smoking status: Never Smoker    Smokeless tobacco: Never Used   Substance and Sexual Activity    Alcohol use:  Yes     Alcohol/week: 0.0 standard drinks     Comment: social    Drug use: No    Sexual activity: Not Currently   Lifestyle    Physical activity     Days per week: None     Minutes per session: None    Stress: None   Relationships    Social connections     Talks on phone: None     Gets together: None     Attends Gnosticist service: None     Active member of club or organization: None     Attends meetings of clubs or organizations: None     Relationship status: None    Intimate partner violence     Fear of current or ex partner: None     Emotionally abused: None     Physically abused: None     Forced sexual activity: None   Other Topics Concern    None   Social History Narrative    None       Family History:  Family History   Problem Relation Age of Onset    Heart Disease Mother 48    High Blood Pressure Mother 48    Diabetes Father 61    Heart Disease Father 48    High Blood Pressure Father 48    Diabetes Paternal Uncle     Breast Cancer Maternal Grandmother 79        breast     Diabetes Paternal Grandmother     Stroke Neg Hx     Colon Cancer Neg Hx     Uterine Cancer Neg Hx     Ovarian Cancer Neg Hx        Vitals:   BP periosteal reaction. No soft tissue masses. Impression: Medial plateau fracture of the left knee. Plan:   Treatment : I reviewed the X-ray with the patient and I informed them that the patient has a medial tibial plateau fracture, although a fracture of the tibial spine cannot be ruled out. We discussed the etiologies and natural histories of medial tibial plateau fractures and tibial spine fractures. We discussed the various treatment alternatives including anti-inflammatory medications, physical therapy, injections, further imaging studies and as a last result surgery. During today's visit, I explained that tibial plateau fractures can be treated nonoperatively or operatively with good outcomes depending on the severity of the fracture. Her fracture is minimally displaced, and at this time she should do well with nonoperative therapy. Nonoperative therapy in this case will consist of a brace locked in extension (she can unlock the brace and bend 90 degrees when sitting or using the commode), norco pain medication, physical therapy for gait training, non-weight bearing to the left lower extremity, ice, elevation and compression, and finally a CT scan to assess the extent of damage. The patient has opted for all of these options with the exception of narcotic pain medication, as she has medication left over from her parathyroid surgery and has no interest in taking additional pain medication. A physical therapy prescription was given. A Rx of was not given. Patient should return to the clinic in 2 weeks to follow up with Gwyn Bernheim D.O. The patient will call the office immediately with any problems. No orders of the defined types were placed in this encounter. Orders Placed This Encounter   Procedures    CT KNEE LEFT WO CONTRAST     Tibial plateau with 3 D reconstruction     Standing Status:   Future     Standing Expiration Date:   9/28/2021     Scheduling Instructions:       With [facility]     Referral Priority:   Routine     Referral Type:   Eval and Treat     Referral Reason:   Specialty Services Required     Requested Specialty:   Physical Therapy     Number of Visits Requested:   1       I, Evin Rebolledo MD, am scribing for and in the presence of Terris Kussmaul D.O.. 9/28/2020  8:26 AM      I, Terris Kussmaul DO, have personally seen this patient and I have reviewed the CC, PMH, FHX and Social History as provided by other clinical staff. I reassessed the HPI and ROS as scribed by Josafat Esqueda MD in my presence and it is both accurate and complete. Thereafter, I personally performed the PE, reviewed the imaging and established the DX and POC. I agree with the documentation provided by the Resident Physician. I have reviewed all documentation in its entirety prior to providing my signature indicating agreement. Any areas of disagreement are noted on the chart.     Electronically signed by Sinai Daniels DO on 10/4/2020 at 8:33 PM        Electronically signed by Evin Rebolledo MD, on 9/28/2020 at 8:26 AM

## 2020-10-01 ENCOUNTER — HOSPITAL ENCOUNTER (OUTPATIENT)
Dept: CT IMAGING | Facility: CLINIC | Age: 50
Discharge: HOME | End: 2020-10-03
Payer: COMMERCIAL

## 2020-10-01 PROCEDURE — 76376 3D RENDER W/INTRP POSTPROCES: CPT

## 2020-10-01 PROCEDURE — 73700 CT LOWER EXTREMITY W/O DYE: CPT

## 2020-10-05 ENCOUNTER — TELEPHONE (OUTPATIENT)
Dept: ORTHOPEDIC SURGERY | Age: 50
End: 2020-10-05

## 2020-10-05 NOTE — TELEPHONE ENCOUNTER
----- Message from Jean-Paul Chowdary DO sent at 10/4/2020  8:01 PM EDT -----  Call with results, minimally displaced fracture not requiring surgery. Follow up xrays 2 weeks post injury.   ----- Message -----  From: Caitie Valencia Incoming Radiant Results From [place]  Sent: 10/1/2020   4:19 PM EDT  To: Jean-Paul Chowdary DO

## 2020-10-05 NOTE — TELEPHONE ENCOUNTER
Dr. Gerard Lowery would like to discuss the results of her CT Scan. She is concerned regarding the measurement given by the report,  She may be reached at her mobile number.

## 2020-10-12 ENCOUNTER — HOSPITAL ENCOUNTER (OUTPATIENT)
Age: 50
Setting detail: SPECIMEN
Discharge: HOME | End: 2020-10-12
Payer: COMMERCIAL

## 2020-10-12 ENCOUNTER — OFFICE VISIT (OUTPATIENT)
Dept: ORTHOPEDIC SURGERY | Age: 50
End: 2020-10-12
Payer: COMMERCIAL

## 2020-10-12 VITALS
HEART RATE: 49 BPM | WEIGHT: 192 LBS | HEIGHT: 63 IN | DIASTOLIC BLOOD PRESSURE: 79 MMHG | BODY MASS INDEX: 34.02 KG/M2 | SYSTOLIC BLOOD PRESSURE: 129 MMHG

## 2020-10-12 LAB
ALBUMIN SERPL-MCNC: 4.1 G/DL (ref 3.5–5.2)
ALBUMIN/GLOBULIN RATIO: 1.2 (ref 1–2.5)
ALP BLD-CCNC: 92 U/L (ref 35–104)
ALT SERPL-CCNC: 14 U/L (ref 5–33)
ANION GAP SERPL CALCULATED.3IONS-SCNC: 12 MMOL/L (ref 9–17)
AST SERPL-CCNC: 15 U/L
BILIRUB SERPL-MCNC: 0.82 MG/DL (ref 0.3–1.2)
BUN BLDV-MCNC: 8 MG/DL (ref 6–20)
BUN/CREAT BLD: NORMAL (ref 9–20)
CALCIUM SERPL-MCNC: 9.1 MG/DL (ref 8.6–10.4)
CALCIUM TIMED UR: NORMAL MG/X H
CALCIUM URINE: 3.2 MG/DL
CALCIUM, URINE: 135 MG/24 H (ref 20–275)
CHLORIDE BLD-SCNC: 107 MMOL/L (ref 98–107)
CO2: 21 MMOL/L (ref 20–31)
CREAT SERPL-MCNC: 0.73 MG/DL (ref 0.5–0.9)
CREAT SERPL-MCNC: 0.73 MG/DL (ref 0.5–0.9)
CREATININE CLEARANCE: 110.9 ML/MIN/BSA (ref 71–151)
CREATININE URINE: 30.5 MG/DL (ref 28–217)
GFR AFRICAN AMERICAN: >60 ML/MIN
GFR NON-AFRICAN AMERICAN: >60 ML/MIN
GFR SERPL CREATININE-BSD FRML MDRD: NORMAL ML/MIN/{1.73_M2}
GFR SERPL CREATININE-BSD FRML MDRD: NORMAL ML/MIN/{1.73_M2}
GLUCOSE BLD-MCNC: 88 MG/DL (ref 70–99)
HOURS COLLECTED: 24 H
LENGTH OF COLLECTION: 24 H
PATIENT HEIGHT: 163 CM
PATIENT WEIGHT: 85 KG
POTASSIUM SERPL-SCNC: 3.8 MMOL/L (ref 3.7–5.3)
PTH INTACT: 56.54 PG/ML (ref 15–65)
SODIUM BLD-SCNC: 140 MMOL/L (ref 135–144)
TOTAL PROTEIN: 7.5 G/DL (ref 6.4–8.3)
VITAMIN D 25-HYDROXY: 41.2 NG/ML (ref 30–100)
VOLUME: 4211 ML
VOLUME: 4211 ML

## 2020-10-12 PROCEDURE — 99213 OFFICE O/P EST LOW 20 MIN: CPT | Performed by: ORTHOPAEDIC SURGERY

## 2020-10-12 ASSESSMENT — ENCOUNTER SYMPTOMS
CONSTIPATION: 0
ABDOMINAL PAIN: 0
VOMITING: 0
SHORTNESS OF BREATH: 0
NAUSEA: 0
ABDOMINAL DISTENTION: 0
APNEA: 0
COUGH: 0
CHEST TIGHTNESS: 0
COLOR CHANGE: 0
DIARRHEA: 0

## 2020-10-12 NOTE — PATIENT INSTRUCTIONS
Patient Education        Knee: Exercises  Introduction  Here are some examples of exercises for you to try. The exercises may be suggested for a condition or for rehabilitation. Start each exercise slowly. Ease off the exercises if you start to have pain. You will be told when to start these exercises and which ones will work best for you. How to do the exercises  Quad sets   1. Sit with your leg straight and supported on the floor or a firm bed. (If you feel discomfort in the front or back of your knee, place a small towel roll under your knee.)  2. Tighten the muscles on top of your thigh by pressing the back of your knee flat down to the floor. (If you feel discomfort under your kneecap, place a small towel roll under your knee.)  3. Hold for about 6 seconds, then rest for up to 10 seconds. 4. Do 8 to 12 repetitions several times a day. Straight-leg raises to the front   1. Lie on your back with your good knee bent so that your foot rests flat on the floor. Your injured leg should be straight. Make sure that your low back has a normal curve. You should be able to slip your flat hand in between the floor and the small of your back, with your palm touching the floor and your back touching the back of your hand. 2. Tighten the thigh muscles in the injured leg by pressing the back of your knee flat down to the floor. Hold your knee straight. 3. Keeping the thigh muscles tight, lift your injured leg up so that your heel is about 12 inches off the floor. Hold for about 6 seconds and then lower slowly. 4. Do 8 to 12 repetitions, 3 times a day. Straight-leg raises to the outside   1. Lie on your side, with your injured leg on top. 2. Tighten the front thigh muscles of your injured leg to keep your knee straight. 3. Keep your hip and your leg straight in line with the rest of your body, and keep your knee pointing forward. Do not drop your hip back.   4. Lift your injured leg straight up toward the ceiling, want to add a cuff weight to your ankle (not more than 5 pounds). With weight, you do not have to lift your leg more than 12 inches to get a hamstring workout. Shallow standing knee bends   Do this exercise only if you have very little pain; if you have no clicking, locking, or giving way if you have an injured knee; and if it does not hurt while you are doing 8 to 12 repetitions. 1. Stand with your hands lightly resting on a counter or chair in front of you. Put your feet shoulder-width apart. 2. Slowly bend your knees so that you squat down like you are going to sit in a chair. Make sure your knees do not go in front of your toes. 3. Lower yourself about 6 inches. Your heels should remain on the floor at all times. 4. Rise slowly to a standing position. Heel raises   1. Stand with your feet a few inches apart, with your hands lightly resting on a counter or chair in front of you. 2. Slowly raise your heels off the floor while keeping your knees straight. 3. Hold for about 6 seconds, then slowly lower your heels to the floor. 4. Do 8 to 12 repetitions several times during the day. Follow-up care is a key part of your treatment and safety. Be sure to make and go to all appointments, and call your doctor if you are having problems. It's also a good idea to know your test results and keep a list of the medicines you take. Where can you learn more? Go to [email or web address] org and sign in to your [employer] account. Enter D979 in the [facility] box to learn more about \"Knee: Exercises. \"     If you do not have an account, please click on the \"Sign Up Now\" link. Current as of: March 2, 2020               Content Version: 12.6  © 4900-2880 [employer]. Care instructions adapted under license by [facility] ([facility]).  If you have questions about a medical condition or this instruction, always ask your healthcare professional. [address] any warranty or liability for your use of this information.

## 2020-10-12 NOTE — PROGRESS NOTES
[facility]  [address] [place] [id number]  Dept: [phone]  Dept Fax: [phone]                                                                                       Left Knee - Follow Up     Subjective:     Chief Complaint   Patient presents with    Knee Pain     Left knee pain, CT review DOI- 9/27/20     HPI:     Lara Parker presents today for left knee pain. She comes to the office today using a wheel chair which is in good repair. The injury and pain occurred on 09/27/2020. Therefore, we are 2 weeks post injury. The patient recalls a specific injury where the knee popped when she fell out of a boat while [place]. She felt the pop on the lateral aspect of her knee. Her pain is mainly within the knee, and she notices it with range of motion. The instructor instructed her to keep her ankle anchored into the boat so she does not fall out but she fell out of the boat with her ankle anchored, which resulted in a pop and pain in the knee. The patient was unable to bear weight since the injury, with a painful drive home. The patient has tried Aleve, rest, ice, hinged knee brace, crutches, wheelchair, and compression and elevation with mild improvement. The pain is now described as Achy and Dull. There is pain with weight bearing. The knee has swelled. There is no painful popping and clicking. The knee has not caught or locked up. The knee has not given out. It is stiff upon arising from sitting. It is painful to go up and down stairs and sit for a prolonged time. The patient has not had a cortisone injection. The patient has not tried a lubrication injection. The patient has not tried physical therapy. The patient has not had surgery. The patient is here to review her CT scan of her left knee. ROS:   Review of Systems   Constitutional: Positive for activity change.  Negative for appetite mouth or lie down for the next 30 minutes. 30 tablet 3    ibandronate (BONIVA) 150 MG tablet Take 1 tablet by mouth every 30 days 3 tablet 3    Omega-3 1000 MG CAPS Take 900 mg by mouth      vitamin C (ASCORBIC ACID) 500 MG tablet Take 1,000 mg by mouth daily      fexofenadine (ALLEGRA) 180 MG tablet Take 180 mg by mouth daily      levothyroxine (SYNTHROID) 112 MCG tablet Take 1 tablet by mouth daily 30 tablet 5    albuterol sulfate HFA (PROAIR HFA) 108 (90 BASE) MCG/ACT inhaler Inhale 2 puffs into the lungs every 6 hours as needed for Wheezing 3 Inhaler 3     No current facility-administered medications for this visit. Allergies:    Losartan    Social History:   Social History     Socioeconomic History    Marital status:      Spouse name: None    Number of children: None    Years of education: None    Highest education level: None   Occupational History    None   Social Needs    Financial resource strain: None    Food insecurity     Worry: None     Inability: None    Transportation needs     Medical: None     Non-medical: None   Tobacco Use    Smoking status: Never Smoker    Smokeless tobacco: Never Used   Substance and Sexual Activity    Alcohol use:  Yes     Alcohol/week: 0.0 standard drinks     Comment: social    Drug use: No    Sexual activity: Not Currently   Lifestyle    Physical activity     Days per week: None     Minutes per session: None    Stress: None   Relationships    Social connections     Talks on phone: None     Gets together: None     Attends [employer] service: None     Active member of club or organization: None     Attends meetings of clubs or organizations: None     Relationship status: None    Intimate partner violence     Fear of current or ex partner: None     Emotionally abused: None     Physically abused: None     Forced sexual activity: None   Other Topics Concern    None   Social History Narrative    None       Family History:  Family History Problem Relation Age of Onset    Heart Disease Mother 48    High Blood Pressure Mother 48    Diabetes Father 61    Heart Disease Father 48    High Blood Pressure Father 48    Diabetes Paternal Uncle     Breast Cancer Maternal Grandmother 79        breast     Diabetes Paternal Grandmother     Stroke Neg Hx     Colon Cancer Neg Hx     Uterine Cancer Neg Hx     Ovarian Cancer Neg Hx        Vitals:   /79   Pulse (!) 49   Ht 5' 3\" (1.6 m)   Wt 192 lb (87.1 kg)   LMP 02/17/2020   BMI 34.01 kg/m²  Body mass index is 34.01 kg/m². Physical Examination:     Orthopedics:    GENERAL: Alert and oriented X3 in no acute distress. SKIN: Intact without lesions or ulcerations. NEURO: Intact to sensory and motor testing. VASC: Capillary refill is less than 3 seconds. KNEE EXAM    LOCATION: Left Knee  GEN: Alert and oriented X 3, in no acute distress. GAIT: The patient's gait was observed while entering the exam room and was noted to be antalgic. The extremity is in anatomic alignment. SKIN: Intact without rashes, lesions, or ulcerations. No obvious deformity or swelling. NEURO: The patient responds to light touch throughout left LE. Patellar and Achilles reflexes are 2/4. VASC: The left LE is neurovascularly intact with 2/4 DP and 2/4 PT pulses. Brisk capillary refill. ROM: 0/116 degrees. There is mild effusion. MUSC: Decreased quad tone. LIGAMENT: Lachman's test is Negative with Good endpoint. Anterior drawer Negative. Posterior drawer Negative. There is No varus instability at 0 degrees and No varus instability at 30 degrees. There is No valgus instability at 0 degrees and No valgus instability at 30 degrees. Pain with varus stress testing. SPECIAL: Shelley test is negative with no clunks, no crepitation, and no pain. PALP: There is medial joint line pain. Medial tibial plateau pain to palpation. Assessment:     1.  Closed fracture of lateral portion of left tibial plateau with routine healing, subsequent encounter    2. Closed fracture of medial portion of left tibial plateau with routine healing, subsequent encounter      Procedures:    Procedure: no  Radiology:   Previous films were reviewed with the patient. X-rays taken on 09/28/2020. CT scan taken on 10/01/2020. Narrative    EXAMINATION:    CT OF THE LEFT KNEE WITHOUT CONTRAST; 3D RECONSTRUCTIONS 10/1/2020 11:02 am         TECHNIQUE:    CT of the left knee was performed without the administration of intravenous    contrast.  Multiplanar reformatted images are provided for review. Dose    modulation, iterative reconstruction, and/or weight based adjustment of the    mA/kV was utilized to reduce the radiation dose to as low as reasonably    achievable. ; 3D reconstructions were performed on a separate workstation.     Dose modulation, iterative reconstruction, and/or weight based adjustment of    the mA/kV was utilized to reduce the radiation dose to as low as reasonably    achievable.         COMPARISON:    Left knee plain radiographs from 09/28/2020         HISTORY    ORDERING SYSTEM PROVIDED HISTORY: Closed fracture of medial portion of left    tibial plateau, initial encounter    TECHNOLOGIST PROVIDED HISTORY:    Tibial plateau with 3 D reconstruction    Is the patient pregnant?->No    Reason for Exam: Fracture medial portion of left tibial plateau s/p white    water rafting    Acuity: Acute    Type of Exam: Initial         55-year-old female with fracture of medial portion of the left tibial plateau    after white water rafting         FINDINGS:    Bones: Acute minimally depressed fracture involving the posterior aspect of    the lateral tibial plateau without significant articular offset.  This is    best seen on image 51, series 301 and image 45, series 300, this accounts for    a 2.7 cm transverse segment by a 1.5 cm AP dimension segment best seen on    image 54, series 2 and image 45, series 300, image 51 and, series 301.      Proximal fibula appears intact.         Distal femur and patella appear intact.         Remaining visualized osseous structures appear intact.  Osseous alignment is    normal.         Probable chondroid matrix geographic lesion in the distal femoral metaphysis    measuring 9 mm likely a small enchondroma best seen on image 37, series 300    image 42, series 301 as well as image 36, series 2.         Lucency at the medial tibial plateau which could represent a small nutrient    foramen with subcortical cystic change at the weight-bearing surfaces of the    medial femoral condyle and medial tibial plateau.         Soft Tissue:  Mild edema in the subcutaneous fat primarily along the anterior    aspect of the knee.  Trace Ochoa's cyst.         Joint:  Mild tricompartmental osteophyte spurring.         Joint spaces are relatively well maintained.         Small suprapatellar joint effusion.              Impression    1. Acute minimally depressed fracture involving the posterior aspect of the    lateral tibial plateau without significant articular offset measuring 2.7 x    1.5 cm in greatest transverse and AP dimensions. 2. Mild tricompartmental osteophyte spurring. 3. Small suprapatellar joint effusion. 4. 9 mm distal femoral metaphyseal enchondroma. 5. Mild edema in the subcutaneous fat along the anterior aspect of the knee. Trace Ochoa's cyst.        Plan:   Treatment : I reviewed the X-ray and CT Scan with the patient and I informed the patient that I would argue that she not only has a tibial plateau fracture on the lateral side but the medial side as well. We discussed the etiologies and natural histories of a medial and lateral tibial plateau fracture of the left knee. We discussed the various treatment alternatives including anti-inflammatory medications, physical therapy, injections, further imaging studies and as a last result surgery.  During today's visit, I explained to the patient that she can start putting whatever percentage down with her left leg while ambulating with the crutches. If that percentage is only 20% then she should put 20% pressure down while using 80% upper body. She should advance her weight bearing as tolerated with two crutches. I explained to the patient that in 2 weeks if she hasn't already she can transition to one crutch if she is pain free. I told her she does not need to sleep in her brace or wear the brace when sitting. She can unlock the brace when doing her home therapy exercises that were given to her today on her AVS. However, I explained to her that she should keep her brace locked when she is up walking around with the crutches. I informed the patient that she can continue to take Tylenol for the pain. She can take anti-inflammatories as well, but if she doesn't feel like she needs them then she doesn't have to take them. I told her we will see how she is doing in 2 weeks. She can go back to work as early as 2 weeks depending on how she feels and what she is ambulating with, or she could go back as late as 6 weeks. Simple knee exercises were printed on the patient's AVS. The patient has opted for ambulating with crutches as tolerated, wearing the brace locked in full extension when ambulating and unlocking the brace for exercises, and seeing us back in a couple of weeks with standing PCXR. A physical therapy prescription was not given. Patient should return to the clinic in 2 weeks with standing PCXR of the left knee to follow up with Milly Root D.O. . The patient will call the office immediately with any problems. No orders of the defined types were placed in this encounter. No orders of the defined types were placed in this encounter. Stephan Farris MS, AT, ATC, am scribing for and in the presence of Milly CRUZ. 10/12/2020  3:09 PM        Electronically signed by Lyn Peraza ATC, on 10/12/2020 at 3:09 PM             I, Milly Root DO, have personally seen this patient and I have reviewed the CC, PMH, FHX and Social History as provided by other clinical staff. I reassessed the HPI and ROS as scribed by Braden Alaniz ATC in my presence and it is both accurate and complete. Thereafter, I personally performed the PE, reviewed the imaging and established the DX and POC. I agree with the documentation provided by the . I have reviewed all documentation in its entirety prior to providing my signature indicating agreement. Any areas of disagreement are noted on the chart.     Electronically signed by Lenin Rodriguez DO on 10/17/2020 at 10:30 AM

## 2020-10-14 LAB — VITAMIN D 1,25-DIHYDROXY: 104 PG/ML (ref 19.9–79.3)

## 2020-10-26 ENCOUNTER — OFFICE VISIT (OUTPATIENT)
Dept: ORTHOPEDIC SURGERY | Age: 50
End: 2020-10-26
Payer: COMMERCIAL

## 2020-10-26 VITALS
HEART RATE: 54 BPM | DIASTOLIC BLOOD PRESSURE: 74 MMHG | SYSTOLIC BLOOD PRESSURE: 120 MMHG | TEMPERATURE: 97.3 F | BODY MASS INDEX: 34.02 KG/M2 | WEIGHT: 192 LBS | HEIGHT: 63 IN

## 2020-10-26 PROCEDURE — 99213 OFFICE O/P EST LOW 20 MIN: CPT | Performed by: ORTHOPAEDIC SURGERY

## 2020-10-26 ASSESSMENT — ENCOUNTER SYMPTOMS
EYES NEGATIVE: 1
GASTROINTESTINAL NEGATIVE: 1
ALLERGIC/IMMUNOLOGIC NEGATIVE: 1
RESPIRATORY NEGATIVE: 1

## 2020-10-26 NOTE — PROGRESS NOTES
[address] [facility]  [address] [place] [address]  Dept: [phone]  Dept Fax: [phone]          Left Knee - Follow Up     Subjective:     Chief Complaint   Patient presents with    Knee Injury     Left knee injury DOI- 9/27/20     HPI:     Santa Falk presents today for her minimally displaced left lateral posterior tibial plateau fracture. DOI was on 9/27/20 when she injured it white river rafting. She has been using crutches and instructed to be progressively weightbearing as tolerated with the use of the crutches. She has been using a brace, and minimal use of anti-inflammatories. 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 She denies numbness, tingling, fever, chills. She has not been in physical therapy. She states that she is better now than she was the last time she was in for her office visit. She is happy with her progression but still minimally places weight on her left leg. ROS:   Review of Systems   Constitutional: Negative. HENT: Negative. Eyes: Negative. Respiratory: Negative. Cardiovascular: Negative. Gastrointestinal: Negative. Endocrine: Negative. Genitourinary: Negative. Musculoskeletal: Positive for arthralgias, gait problem, joint swelling and myalgias. Skin: Negative. Allergic/Immunologic: Negative. Neurological: Negative for numbness. Hematological: Negative. Psychiatric/Behavioral: Negative.         Past Medical History:    Past Medical History:   Diagnosis Date    Allergic rhinitis     Asthma     Excessive bleeding     Pt denies    GERD (gastroesophageal reflux disease)     Halitosis     Hypertension     Hypothyroidism     Mild intermittent asthma without complication 4/08/7043    Obesity 9/16/2020    SACHA (obstructive sleep apnea)     Osteoporosis     Screen for colon cancer     Urinary retention     Uterine fibroid        Past Surgical History:    Past Surgical History:   Procedure Laterality Date     SECTION      PARATHYROID GLAND SURGERY Right 3/27/2020    NECK EXPLORATION AND REMOVAL OF PARATHYROID ADENOMA performed by Marisol Easton MD at [address] [place] [address]  2018    UPPER GASTROINTESTINAL ENDOSCOPY N/A 2019    EGD BIOPSY performed by Alex Kaba MD at [facility]       CurrentMedications:   Current Outpatient Medications   Medication Sig Dispense Refill    PARoxetine (PAXIL) 10 MG tablet Take 1 tablet by mouth every morning 90 tablet 0    bisoprolol (ZEBETA) 5 MG tablet Take 1 tablet by mouth daily 90 tablet 0    ibandronate (BONIVA) 150 MG tablet Take 1 tablet by mouth every 30 days Take one (1) tablet once per month in the morning with a full glass of water, on an empty stomach, and do not take anything else by mouth or lie down for the next 30 minutes. 30 tablet 3    ibandronate (BONIVA) 150 MG tablet Take 1 tablet by mouth every 30 days 3 tablet 3    Omega-3 1000 MG CAPS Take 900 mg by mouth      vitamin C (ASCORBIC ACID) 500 MG tablet Take 1,000 mg by mouth daily      fexofenadine (ALLEGRA) 180 MG tablet Take 180 mg by mouth daily      levothyroxine (SYNTHROID) 112 MCG tablet Take 1 tablet by mouth daily 30 tablet 5    albuterol sulfate HFA (PROAIR HFA) 108 (90 BASE) MCG/ACT inhaler Inhale 2 puffs into the lungs every 6 hours as needed for Wheezing 3 Inhaler 3     No current facility-administered medications for this visit.         Allergies:    Losartan    Social History:   Social History     Socioeconomic History    Marital status:      Spouse name: None    Number of children: None    Years of education: None    Highest education level: None   Occupational History    None   Social Needs    Financial resource strain: None    Food insecurity     Worry: None     Inability: None    Transportation needs     Medical: None     Non-medical: None   Tobacco Use    Smoking status: Never Smoker    Smokeless tobacco: Never Used   Substance and Sexual Activity    Alcohol use: Yes     Alcohol/week: 0.0 standard drinks     Comment: social    Drug use: No    Sexual activity: Not Currently   Lifestyle    Physical activity     Days per week: None     Minutes per session: None    Stress: None   Relationships    Social connections     Talks on phone: None     Gets together: None     Attends [employer] service: None     Active member of club or organization: None     Attends meetings of clubs or organizations: None     Relationship status: None    Intimate partner violence     Fear of current or ex partner: None     Emotionally abused: None     Physically abused: None     Forced sexual activity: None   Other Topics Concern    None   Social History Narrative    None       Family History:  Family History   Problem Relation Age of Onset    Heart Disease Mother 48    High Blood Pressure Mother 48    Diabetes Father 61    Heart Disease Father 48    High Blood Pressure Father 48    Diabetes Paternal Uncle     Breast Cancer Maternal Grandmother 79        breast     Diabetes Paternal Grandmother     Stroke Neg Hx     Colon Cancer Neg Hx     Uterine Cancer Neg Hx     Ovarian Cancer Neg Hx        Vitals:   /74   Pulse 54   Temp 97.3 °F (36.3 °C)   Ht 5' 3\" (1.6 m)   Wt 192 lb (87.1 kg)   LMP 02/17/2020   BMI 34.01 kg/m²  Body mass index is 34.01 kg/m². Physical Examination:     Orthopedics:    GENERAL: Alert and oriented X3 in no acute distress. SKIN: Intact without lesions or ulcerations. NEURO: Intact to sensory and motor testing. VASC: Capillary refill is less than 3 seconds. KNEE EXAM    LOCATION: Left Knee  GEN: Alert and oriented X 3, in no acute distress. GAIT: The patient's arrived in a wheel chair, despite using crutches at home. The extremity is in anatomic alignment. SKIN: Intact without rashes, lesions, or ulcerations. No obvious deformity.   NEURO: The patient responds to light touch throughout left lower extremity. Patellar and Achilles reflexes are 2/4. VASC: The left lower extremity is neurovascularly intact with 2+ DP and 2+ PT pulses. Brisk capillary refill is noted. ROM: 0-90 degrees flexion/extension. 0 degree extensor lag/flexion contracture. There is no effusion. MUSC: slightly decreased quad tone  LIGAMENT: Lachman's test is negative with good endpoint. Anterior drawer negative. Posterior drawer negative. There is No varus instability at 0 degrees and No varus instability at 30 degrees. There is No valgus instability at 0 degrees and No valgus instability at 30 degrees. SPECIAL: Shelley test is mildly positive with no clunks, mild crepitation, and positive pain. PALP: There is minimal lateral condyle and joint line pain. Assessment:     1. Closed fracture of lateral portion of left tibial plateau with routine healing, subsequent encounter      Procedures:    Procedure: no  Radiology:   LEFT KNEE X-RAY    4 views of the left knee including AP, bilateral tunnel, and lateral in the upright position, and skyline views reveal good alignment with progressive interval healing of posterior lateral tibial plateau fracture. Kellgren grade 2 changes of osteoarthritis (joint space narrowing, osteophyte, subchondral sclerosis, bony deformity/cyst) of the medial, lateral, and patellofemoral compartment(s). No osseous loose bodies. No bony erosion or periosteal reaction. No soft tissue masses. Impression: Progressive interval healing of the left lateral/posterior tibial plateau. Plan:   I reviewed the X-ray with the patient and I informed them that she is progressing quite well clinically and based on current radiographs. We discussed the etiologies and natural histories of posterior lateral tibial plateau fracture.  We discussed the various treatment alternatives including anti-inflammatory medications, physical therapy, injections, further imaging studies

## 2020-11-18 ENCOUNTER — TELEPHONE (OUTPATIENT)
Dept: ORTHOPEDIC SURGERY | Age: 50
End: 2020-11-18

## 2020-11-18 NOTE — TELEPHONE ENCOUNTER
Patient states employee health never got original fax on 10/27/20.  I re faxed clearance form today to [phone]

## 2020-11-18 NOTE — TELEPHONE ENCOUNTER
Patient called asking for her RTW be sent to her office as her  has not received that yet. RTW was sent to [employer] via fax on 10/27/2020. My manager states that her  should go through [employer] for that. I will contact patient back to explain that process to them.

## 2020-12-16 ENCOUNTER — OFFICE VISIT (OUTPATIENT)
Dept: FAMILY MEDICINE CLINIC | Age: 50
End: 2020-12-16
Payer: COMMERCIAL

## 2020-12-16 VITALS
DIASTOLIC BLOOD PRESSURE: 86 MMHG | SYSTOLIC BLOOD PRESSURE: 140 MMHG | HEART RATE: 70 BPM | BODY MASS INDEX: 34.9 KG/M2 | WEIGHT: 197 LBS | OXYGEN SATURATION: 98 % | TEMPERATURE: 97.3 F

## 2020-12-16 PROBLEM — M81.8 OTHER OSTEOPOROSIS WITHOUT CURRENT PATHOLOGICAL FRACTURE: Status: RESOLVED | Noted: 2017-09-27 | Resolved: 2020-12-16

## 2020-12-16 PROBLEM — S82.141D CLOSED FRACTURE OF RIGHT TIBIAL PLATEAU WITH ROUTINE HEALING: Status: ACTIVE | Noted: 2020-12-16

## 2020-12-16 PROCEDURE — 99214 OFFICE O/P EST MOD 30 MIN: CPT | Performed by: FAMILY MEDICINE

## 2020-12-16 RX ORDER — ALENDRONATE SODIUM 35 MG/1
35 TABLET ORAL
Qty: 12 TABLET | Refills: 5 | Status: SHIPPED | OUTPATIENT
Start: 2020-12-16 | End: 2021-11-03 | Stop reason: SDUPTHER

## 2020-12-16 RX ORDER — IBANDRONATE SODIUM 150 MG/1
150 TABLET, FILM COATED ORAL
Qty: 30 TABLET | Refills: 3 | Status: CANCELLED | OUTPATIENT
Start: 2020-12-16

## 2020-12-16 RX ORDER — BISOPROLOL FUMARATE 5 MG/1
5 TABLET ORAL DAILY
Qty: 90 TABLET | Refills: 0 | Status: SHIPPED | OUTPATIENT
Start: 2020-12-16 | End: 2021-03-15 | Stop reason: SDUPTHER

## 2020-12-16 RX ORDER — PAROXETINE 10 MG/1
10 TABLET, FILM COATED ORAL EVERY MORNING
Qty: 90 TABLET | Refills: 0 | Status: SHIPPED | OUTPATIENT
Start: 2020-12-16 | End: 2021-03-15 | Stop reason: SDUPTHER

## 2020-12-16 ASSESSMENT — PATIENT HEALTH QUESTIONNAIRE - PHQ9
SUM OF ALL RESPONSES TO PHQ QUESTIONS 1-9: 0
SUM OF ALL RESPONSES TO PHQ QUESTIONS 1-9: 0
1. LITTLE INTEREST OR PLEASURE IN DOING THINGS: 0
SUM OF ALL RESPONSES TO PHQ9 QUESTIONS 1 & 2: 0
2. FEELING DOWN, DEPRESSED OR HOPELESS: 0
SUM OF ALL RESPONSES TO PHQ QUESTIONS 1-9: 0

## 2020-12-16 ASSESSMENT — ENCOUNTER SYMPTOMS
ABDOMINAL DISTENTION: 0
RECTAL PAIN: 0
EYE REDNESS: 0
BLOOD IN STOOL: 0
SINUS PRESSURE: 0
SHORTNESS OF BREATH: 0
ANAL BLEEDING: 0
COUGH: 0
EYE DISCHARGE: 0
ABDOMINAL PAIN: 0
DIARRHEA: 0
CHEST TIGHTNESS: 0
CONSTIPATION: 0
EYE PAIN: 0
TROUBLE SWALLOWING: 0
BACK PAIN: 0
VOMITING: 0
NAUSEA: 0
COLOR CHANGE: 0
VOICE CHANGE: 0

## 2020-12-16 NOTE — PROGRESS NOTES
Subjective:      Patient ID: Eligio Gary is a 44 y.o. female. HPI   States would like to come off BP med and may be the paxil Does not feel she has anxiety but states will agree to    to try to continue same dose for both  but not go up . I wanted to try paxil 20 mg as she is perimenopausal and also due to her BP readings fluctuating a lot from looking at her BP log- throughout the day despite the Theta Jose. But she does not want to try this. Her night sweats she was experiencing with the losartan has resolved after switching to B Blocker and she thinks may be starting the paxil. If she is menopausal SSRI are known to  help. States sees Carlita Acuna- [facility] does not remember last visit- encouraged to get appt and do PAP/Pelvic  Q 3 years . States not currently sexually active. LMP was 6-7 months ago. Will check FSH and LH. Mood, sleep appetite are ok. Bowels regular. H/O Sleep disorder but CPAP was not helping - problem was due to low REM sleep- states televisit with Dr Darien Aburto a while ago, as CPAP did not help 2-3 years ago- stopped using it after a couple of days. Recent Tibial Plateau fracture but states fels she is doing a lot better- ambulates without brace /crutches and able to do all activities without limitation. Has not started exercising yet - [facility] plans to start in Jan.  Had seen Dr Darrian Shannon- telephone visit a while ago - [facility] was advised to switch from [facility] [place] to Fosamax x 5 years s/p menopause. - so will change that today. Has been keeping BP log and states it goes up and down significantly even on the zebeta. She was off zebeta x 2 days to see if it would make a diff- HR is normal but BP is up and I recommended that she restart. She may need to see Dr Estuardo Leon for a follow up ( which she promised she will) to see why she has these significant variations on BP through out the day. 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 Review of Systems   Constitutional: Negative for activity change, appetite change and fatigue. HENT: Negative for dental problem, ear pain, hearing loss, postnasal drip, sinus pressure, sneezing, tinnitus, trouble swallowing and voice change. Eyes: Negative for pain, discharge, redness and visual disturbance. Respiratory: Negative for cough, chest tightness and shortness of breath. Cardiovascular: Negative for chest pain, palpitations and leg swelling. Gastrointestinal: Negative for abdominal distention, abdominal pain, anal bleeding, blood in stool, constipation, diarrhea, nausea, rectal pain and vomiting. Endocrine: Negative for cold intolerance, heat intolerance, polydipsia, polyphagia and polyuria. Genitourinary: Positive for menstrual problem. Negative for decreased urine volume, difficulty urinating, dyspareunia, dysuria, enuresis, flank pain, frequency, genital sores, hematuria, pelvic pain, urgency, vaginal bleeding and vaginal discharge. No periods x 6-7 months   Musculoskeletal: Negative for arthralgias, back pain, gait problem, joint swelling, myalgias, neck pain and neck stiffness. Skin: Negative for color change, pallor and rash. Allergic/Immunologic: Negative for environmental allergies, food allergies and immunocompromised state. Neurological: Negative for dizziness, tremors, seizures, syncope, facial asymmetry, speech difficulty, weakness, light-headedness, numbness and headaches. Hematological: Negative for adenopathy. Does not bruise/bleed easily. Psychiatric/Behavioral: Negative for agitation, behavioral problems, confusion, decreased concentration, sleep disturbance and suicidal ideas. The patient is not nervous/anxious. Objective:   Physical Exam  Constitutional:       General: She is not in acute distress. HENT:      Head: Normocephalic and atraumatic.       Right Ear: External ear normal.      Left Ear: External ear normal.   Eyes: Conjunctiva/sclera: Conjunctivae normal.      Pupils: Pupils are equal, round, and reactive to light. Neck:      Musculoskeletal: Normal range of motion. Thyroid: No thyromegaly. Trachea: No tracheal deviation. Cardiovascular:      Rate and Rhythm: Normal rate and regular rhythm. Heart sounds: No murmur. No friction rub. No gallop. Pulmonary:      Effort: No respiratory distress. Breath sounds: No stridor. No wheezing or rales. Chest:      Chest wall: No tenderness. Abdominal:      General: Bowel sounds are normal. There is no distension. Palpations: Abdomen is soft. Tenderness: There is no abdominal tenderness. There is no rebound. Musculoskeletal: Normal range of motion. Lymphadenopathy:      Cervical: No cervical adenopathy. Skin:     General: Skin is warm. Coloration: Skin is not pale. Findings: No erythema or rash. Neurological:      Mental Status: She is alert and oriented to person, place, and time. Cranial Nerves: No cranial nerve deficit. Motor: No abnormal muscle tone. Deep Tendon Reflexes: Reflexes normal.         Assessment:       Diagnosis Orders   1. At increased risk of exposure to COVID-19 virus  Covid-19, Antibody, Total   2. ALEJANDRA (generalized anxiety disorder)  PARoxetine (PAXIL) 10 MG tablet   3. Essential hypertension  bisoprolol (ZEBETA) 5 MG tablet   4. Osteopenia of multiple sites  alendronate (FOSAMAX) 35 MG tablet   5. History of excessive sweating     6. Perimenopausal  Follicle Stimulating Hormone    Luteinizing Hormone   7. Screening mammogram, encounter for  HAROON DIGITAL SCREEN W OR WO CAD BILATERAL   8. Gastroesophageal reflux disease, unspecified whether esophagitis present     9.  Hypothyroidism due to Hashimoto's thyroiditis           Plan:      Orders Placed This Encounter   Procedures    HAROON DIGITAL SCREEN W OR WO CAD BILATERAL    Covid-19, Antibody, Total    Follicle Stimulating Hormone  Luteinizing Hormone       Outpatient Encounter Medications as of 12/16/2020   Medication Sig Dispense Refill    PARoxetine (PAXIL) 10 MG tablet Take 1 tablet by mouth every morning 90 tablet 0    bisoprolol (ZEBETA) 5 MG tablet Take 1 tablet by mouth daily 90 tablet 0    alendronate (FOSAMAX) 35 MG tablet Take 1 tablet by mouth every 7 days 12 tablet 5    ibandronate (BONIVA) 150 MG tablet Take 1 tablet by mouth every 30 days Take one (1) tablet once per month in the morning with a full glass of water, on an empty stomach, and do not take anything else by mouth or lie down for the next 30 minutes. 30 tablet 3    ibandronate (BONIVA) 150 MG tablet Take 1 tablet by mouth every 30 days 3 tablet 3    Omega-3 1000 MG CAPS Take 900 mg by mouth      fexofenadine (ALLEGRA) 180 MG tablet Take 180 mg by mouth daily      levothyroxine (SYNTHROID) 112 MCG tablet Take 1 tablet by mouth daily 30 tablet 5    albuterol sulfate HFA (PROAIR HFA) 108 (90 BASE) MCG/ACT inhaler Inhale 2 puffs into the lungs every 6 hours as needed for Wheezing 3 Inhaler 3    [DISCONTINUED] PARoxetine (PAXIL) 10 MG tablet Take 1 tablet by mouth every morning 90 tablet 0    [DISCONTINUED] bisoprolol (ZEBETA) 5 MG tablet Take 1 tablet by mouth daily 90 tablet 0    vitamin C (ASCORBIC ACID) 500 MG tablet Take 1,000 mg by mouth daily       No facility-administered encounter medications on file as of 12/16/2020.             Maryann Randolph MD

## 2020-12-17 ENCOUNTER — HOSPITAL ENCOUNTER (OUTPATIENT)
Age: 50
Setting detail: SPECIMEN
Discharge: HOME | End: 2020-12-17
Payer: COMMERCIAL

## 2020-12-17 LAB
FOLLICLE STIMULATING HORMONE: 60.3 U/L (ref 1.7–21.5)
LH: 49.9 U/L (ref 1–95.6)
SARS-COV-2 ANTIBODY, TOTAL: NEGATIVE

## 2021-01-18 ENCOUNTER — OFFICE VISIT (OUTPATIENT)
Dept: ORTHOPEDIC SURGERY | Age: 51
End: 2021-01-18
Payer: COMMERCIAL

## 2021-01-18 VITALS
HEART RATE: 54 BPM | BODY MASS INDEX: 34.91 KG/M2 | SYSTOLIC BLOOD PRESSURE: 127 MMHG | TEMPERATURE: 96.9 F | HEIGHT: 63 IN | WEIGHT: 197 LBS | DIASTOLIC BLOOD PRESSURE: 76 MMHG

## 2021-01-18 DIAGNOSIS — M25.561 RIGHT KNEE PAIN, UNSPECIFIED CHRONICITY: Primary | ICD-10-CM

## 2021-01-18 DIAGNOSIS — S82.122D CLOSED FRACTURE OF LATERAL PORTION OF LEFT TIBIAL PLATEAU WITH ROUTINE HEALING, SUBSEQUENT ENCOUNTER: ICD-10-CM

## 2021-01-18 DIAGNOSIS — M17.32 POST-TRAUMATIC OSTEOARTHRITIS OF LEFT KNEE: Primary | ICD-10-CM

## 2021-01-18 DIAGNOSIS — M81.0 OSTEOPOROSIS, UNSPECIFIED OSTEOPOROSIS TYPE, UNSPECIFIED PATHOLOGICAL FRACTURE PRESENCE: ICD-10-CM

## 2021-01-18 DIAGNOSIS — M25.561 RIGHT KNEE PAIN, UNSPECIFIED CHRONICITY: ICD-10-CM

## 2021-01-18 PROCEDURE — 99213 OFFICE O/P EST LOW 20 MIN: CPT | Performed by: ORTHOPAEDIC SURGERY

## 2021-01-18 ASSESSMENT — ENCOUNTER SYMPTOMS
VOMITING: 0
CHEST TIGHTNESS: 0
NAUSEA: 0
DIARRHEA: 0
CONSTIPATION: 0
COUGH: 0
APNEA: 0
COLOR CHANGE: 0
ABDOMINAL DISTENTION: 0
SHORTNESS OF BREATH: 0
ABDOMINAL PAIN: 0

## 2021-01-18 NOTE — PROGRESS NOTES
[facility]  [address] [place] [id number]  Dept: [phone]  Dept Fax: [phone]          Bilateral Knee - Follow Up     Subjective:     Chief Complaint   Patient presents with    Knee Pain     Bilateral knee pain, Left knee injury DOI- 9/27/20     HPI:     Kwesi Ojeda presents today for Bilateral knee pain. The pain has been present for 3-4 months. The patient recalls a no specific injury that caused this pain but did have a left tibial plateau fracture on 6/95/7355. The patient has tried nothing with no improvement. The pain is now described as Achy and Dull . There is not pain on weight bearing. The knee has not swelled. There is not painful popping and clicking. The knee has not caught or locked up. The knee has not given out. It is  stiff upon arising from sitting. It is  painful to go up and down stairs and sit for a prolonged time. The patient has not had a cortisone injection. The patient has not tried a lubrication injection. The patient has not tried physical therapy. The patient has not had surgery. Patient did have a recent left knee fracture on 9/27/20. Pain is worse with stairs, moreseo going down. ROS:   Review of Systems   Constitutional: Positive for activity change. Negative for appetite change, fatigue and fever. Respiratory: Negative for apnea, cough, chest tightness and shortness of breath. Cardiovascular: Negative for chest pain, palpitations and leg swelling. Gastrointestinal: Negative for abdominal distention, abdominal pain, constipation, diarrhea, nausea and vomiting. Genitourinary: Negative for difficulty urinating, dysuria and hematuria. Musculoskeletal: Positive for gait problem and myalgias. Negative for arthralgias and joint swelling. Skin: Negative for color change and rash. Neurological: Negative for dizziness, weakness, numbness and headaches. Psychiatric/Behavioral: Negative for sleep disturbance. Past Medical History:    Past Medical History:   Diagnosis Date    Allergic rhinitis     Asthma     Excessive bleeding     Pt denies    GERD (gastroesophageal reflux disease)     Halitosis     Hypertension     Hypothyroidism     Mild intermittent asthma without complication     Obesity 2020    SACHA (obstructive sleep apnea)     Osteoporosis     Other osteoporosis without current pathological fracture 2017    Screen for colon cancer     Urinary retention     Uterine fibroid        Past Surgical History:    Past Surgical History:   Procedure Laterality Date     SECTION      PARATHYROID GLAND SURGERY Right 3/27/2020    NECK EXPLORATION AND REMOVAL OF PARATHYROID ADENOMA performed by Juliet Zee MD at [address]  2018    UPPER GASTROINTESTINAL ENDOSCOPY N/A 2019    EGD BIOPSY performed by Usama Murrieta MD at [facility]       CurrentMedications:   Current Outpatient Medications   Medication Sig Dispense Refill    PARoxetine (PAXIL) 10 MG tablet Take 1 tablet by mouth every morning 90 tablet 0    bisoprolol (ZEBETA) 5 MG tablet Take 1 tablet by mouth daily 90 tablet 0    alendronate (FOSAMAX) 35 MG tablet Take 1 tablet by mouth every 7 days 12 tablet 5    Omega-3 1000 MG CAPS Take 900 mg by mouth      vitamin C (ASCORBIC ACID) 500 MG tablet Take 1,000 mg by mouth daily      fexofenadine (ALLEGRA) 180 MG tablet Take 180 mg by mouth daily      levothyroxine (SYNTHROID) 112 MCG tablet Take 1 tablet by mouth daily 30 tablet 5    albuterol sulfate HFA (PROAIR HFA) 108 (90 BASE) MCG/ACT inhaler Inhale 2 puffs into the lungs every 6 hours as needed for Wheezing 3 Inhaler 3     No current facility-administered medications for this visit.         Allergies:    Losartan    Social History:   Social History     Socioeconomic History    Marital status:      Spouse name: None  Number of children: None    Years of education: None    Highest education level: None   Occupational History    None   Social Needs    Financial resource strain: None    Food insecurity     Worry: None     Inability: None    Transportation needs     Medical: None     Non-medical: None   Tobacco Use    Smoking status: Never Smoker    Smokeless tobacco: Never Used   Substance and Sexual Activity    Alcohol use: Yes     Alcohol/week: 0.0 standard drinks     Comment: social    Drug use: No    Sexual activity: Not Currently   Lifestyle    Physical activity     Days per week: None     Minutes per session: None    Stress: None   Relationships    Social connections     Talks on phone: None     Gets together: None     Attends Yazidi service: None     Active member of club or organization: None     Attends meetings of clubs or organizations: None     Relationship status: None    Intimate partner violence     Fear of current or ex partner: None     Emotionally abused: None     Physically abused: None     Forced sexual activity: None   Other Topics Concern    None   Social History Narrative    None       Family History:  Family History   Problem Relation Age of Onset    Heart Disease Mother 48    High Blood Pressure Mother 48    Diabetes Father 61    Heart Disease Father 48    High Blood Pressure Father 48    Diabetes Paternal Uncle     Breast Cancer Maternal Grandmother 79        breast     Diabetes Paternal Grandmother     Stroke Neg Hx     Colon Cancer Neg Hx     Uterine Cancer Neg Hx     Ovarian Cancer Neg Hx        Vitals:   /76   Pulse 54   Temp 96.9 °F (36.1 °C)   Ht 5' 3\" (1.6 m)   Wt 197 lb (89.4 kg)   LMP 02/17/2020   BMI 34.90 kg/m²  Body mass index is 34.9 kg/m². Physical Examination:     Orthopedics:    GENERAL: Alert and oriented X3 in no acute distress. SKIN: Intact without lesions or ulcerations. NEURO: Intact to sensory and motor testing. VASC: Capillary refill is less than 3 seconds. KNEE EXAM    LOCATION: Bilateral Knee  GEN: Alert and oriented X 3, in no acute distress. GAIT: The patient's gait was observed while entering the exam room and was noted to be non antalgic. The extremity is in anatomic alignment. SKIN: Intact without rashes, lesions, or ulcerations. No obvious deformity or swelling. NEURO: The patient responds to light touch throughout bilateral LE. Patellar and Achilles reflexes are 2/4. VASC: The bilateral LE is neurovascularly intact with 2/4 DP and 2/4 PT pulses. Brisk capillary refill. ROM: 0/116 degrees bilateral. There is no effusion on right. There is mild effusion on the left  MUSC: slightly decreased quad tone  LIGAMENT: Lachman's test is Negative with Good endpoint on right. 1+ on the left. Anterior drawer Negative. Posterior drawer Negative. There is No varus instability at 0 degrees and No varus instability at 30 degrees. There is No valgus instability at 0 degrees and No valgus instability at 30 degrees. SPECIAL: Shelley test is negative with no clunks, no crepitation, and no pain on the right. There is crepitation on the left  PALP: There is no joint line pain. Medial fat pad and crepitation on the left  Assessment:     1. Post-traumatic osteoarthritis of left knee    2. Closed fracture of lateral portion of left tibial plateau with routine healing, subsequent encounter    3. Right knee pain, unspecified chronicity    4.  Osteoporosis, unspecified osteoporosis type, unspecified pathological fracture presence      Procedures:    Procedure: no  Radiology:   KNEE X-RAY 4 views of the right knee including AP, bilateral tunnel, and lateral in the upright position, and skyline views reveal anatomic alignment with no fracture or dislocation. Kellgren grade 2 changes of osteoarthritis (joint space narrowing, osteophyte, subchondral sclerosis, bony deformity/cyst) of the tri compartment(s). No osseous loose bodies. No bony erosion or periosteal reaction. No soft tissue masses. Impression: mild degenerative changes of the right knee. Plan:   Treatment : I reviewed the X-ray with the patient. We discussed the etiologies and natural histories of post traumatic osteoarthritis of the left knee. We discussed the various treatment alternatives including anti-inflammatory medications, physical therapy, injections, further imaging studies and as a last result surgery. During today's visit, I do suggest that she can try doing NSAIDs at a prescription dose for a short period of time for about 2 weeks. Patient also did talk with me about recent weight gain that she attributes to her injuries. I think physical therapy would help her get back moving again as she has given up on some of her normal exercise activities due to pain. Physical therapy would also be beneficial to help with her pain, strength and ROM. I also suggested we can do a cortisone injection and that would help reduce her pain and swelling. We can also in the future look into doing lubrication injections, but typically insurance companies want to see evidence of cortisone injections and physical therapy before that would be approved. The patient has opted for trying NSAIDs for a short period of time, physical therapy. A physical therapy prescription was given. Patient should return to the clinic in 4- 6 weeks to follow up with Justyn Rose D.O. . The patient will call the office immediately with any problems. No orders of the defined types were placed in this encounter.       Orders Placed This Encounter   Procedures [address] [place] [address]     Referral Priority:   Routine     Referral Type:   Eval and Treat     Referral Reason:   Specialty Services Required     Requested Specialty:   Physical Therapy     Number of Visits Requested:   1       I, Kristyn Larson MS, AT, [facility], am scribing for and in the presence of Jossy CRUZ. 1/19/2021  10:50 AM    Electronically signed by Carolina Hughes DO, on 1/19/2021 at 10:50 AM       I, Jossy Pyle DO, have personally seen this patient and I have reviewed the CC, PMH, FHX and Social History as provided by other clinical staff. I reassessed the HPI and ROS as scribed by Kristyn Larson MS AT [facility] in my presence and it is both accurate and complete. Thereafter, I personally performed the PE, reviewed the imaging and established the DX and POC. I agree with the documentation provided by the . I have reviewed all documentation in its entirety prior to providing my signature indicating agreement. Any areas of disagreement are noted on the chart.     Electronically signed by Carolina Hughes DO on 1/19/2021 at 10:50 AM

## 2021-02-05 ENCOUNTER — HOSPITAL ENCOUNTER (OUTPATIENT)
Age: 51
Setting detail: SPECIMEN
Discharge: HOME | End: 2021-02-05
Payer: COMMERCIAL

## 2021-02-05 DIAGNOSIS — Z11.52 ENCOUNTER FOR SCREENING FOR COVID-19: ICD-10-CM

## 2021-02-05 DIAGNOSIS — Z11.52 ENCOUNTER FOR SCREENING FOR COVID-19: Primary | ICD-10-CM

## 2021-02-06 LAB
SARS-COV-2, RAPID: NORMAL
SARS-COV-2: NORMAL
SARS-COV-2: NOT DETECTED
SOURCE: NORMAL

## 2021-03-15 ENCOUNTER — OFFICE VISIT (OUTPATIENT)
Dept: FAMILY MEDICINE CLINIC | Age: 51
End: 2021-03-15
Payer: COMMERCIAL

## 2021-03-15 VITALS
TEMPERATURE: 97.3 F | SYSTOLIC BLOOD PRESSURE: 103 MMHG | WEIGHT: 199.8 LBS | HEIGHT: 63 IN | DIASTOLIC BLOOD PRESSURE: 69 MMHG | BODY MASS INDEX: 35.4 KG/M2

## 2021-03-15 DIAGNOSIS — E03.9 ACQUIRED HYPOTHYROIDISM: ICD-10-CM

## 2021-03-15 DIAGNOSIS — E05.90 HYPERTHYROIDISM: ICD-10-CM

## 2021-03-15 DIAGNOSIS — F41.1 GAD (GENERALIZED ANXIETY DISORDER): ICD-10-CM

## 2021-03-15 DIAGNOSIS — J45.20 MILD INTERMITTENT ASTHMA WITHOUT COMPLICATION: ICD-10-CM

## 2021-03-15 DIAGNOSIS — M85.89 OSTEOPENIA OF MULTIPLE SITES: ICD-10-CM

## 2021-03-15 DIAGNOSIS — I10 ESSENTIAL HYPERTENSION: ICD-10-CM

## 2021-03-15 DIAGNOSIS — Z11.59 ENCOUNTER FOR HEPATITIS C SCREENING TEST FOR LOW RISK PATIENT: ICD-10-CM

## 2021-03-15 DIAGNOSIS — R93.429 ABNORMAL RENAL ULTRASOUND: Primary | ICD-10-CM

## 2021-03-15 PROCEDURE — 99213 OFFICE O/P EST LOW 20 MIN: CPT | Performed by: FAMILY MEDICINE

## 2021-03-15 RX ORDER — PAROXETINE 10 MG/1
10 TABLET, FILM COATED ORAL DAILY
Qty: 90 TABLET | Refills: 3 | Status: SHIPPED | OUTPATIENT
Start: 2021-03-15 | End: 2022-03-09

## 2021-03-15 RX ORDER — BISOPROLOL FUMARATE 5 MG/1
5 TABLET ORAL DAILY
Qty: 30 TABLET | Refills: 3 | Status: SHIPPED | OUTPATIENT
Start: 2021-03-15 | End: 2021-06-11

## 2021-03-15 SDOH — ECONOMIC STABILITY: TRANSPORTATION INSECURITY
IN THE PAST 12 MONTHS, HAS THE LACK OF TRANSPORTATION KEPT YOU FROM MEDICAL APPOINTMENTS OR FROM GETTING MEDICATIONS?: NOT ASKED

## 2021-03-15 SDOH — ECONOMIC STABILITY: INCOME INSECURITY: HOW HARD IS IT FOR YOU TO PAY FOR THE VERY BASICS LIKE FOOD, HOUSING, MEDICAL CARE, AND HEATING?: NOT HARD AT ALL

## 2021-03-15 ASSESSMENT — ENCOUNTER SYMPTOMS
VOICE CHANGE: 0
BACK PAIN: 0
TROUBLE SWALLOWING: 0
SHORTNESS OF BREATH: 0
SINUS PRESSURE: 0
COLOR CHANGE: 0
DIARRHEA: 0
ABDOMINAL DISTENTION: 0
ANAL BLEEDING: 0
ABDOMINAL PAIN: 0
NAUSEA: 0
CONSTIPATION: 0
BLOOD IN STOOL: 0
COUGH: 0
RECTAL PAIN: 0
EYE REDNESS: 0
CHEST TIGHTNESS: 0
EYE DISCHARGE: 0
VOMITING: 0
EYE PAIN: 0

## 2021-03-15 ASSESSMENT — PATIENT HEALTH QUESTIONNAIRE - PHQ9
1. LITTLE INTEREST OR PLEASURE IN DOING THINGS: 0
2. FEELING DOWN, DEPRESSED OR HOPELESS: 0
2. FEELING DOWN, DEPRESSED OR HOPELESS: 0
SUM OF ALL RESPONSES TO PHQ QUESTIONS 1-9: 0
SUM OF ALL RESPONSES TO PHQ9 QUESTIONS 1 & 2: 0
SUM OF ALL RESPONSES TO PHQ QUESTIONS 1-9: 0
SUM OF ALL RESPONSES TO PHQ QUESTIONS 1-9: 0

## 2021-03-15 NOTE — PROGRESS NOTES
Subjective:      Patient ID: Vilma Lea is a 44 y.o. female. HPI States tried to cit down dose of paxil and zebeta to half but stated getting dizzy spells ,   so went back on the daily dose that was prescribed and currently doing well. Would like to cont both at same doses. States a year ago a US was done that showed some abnormality, was recommended that   she have a renal consult bet never done-due to her varying BP diff times of the day and the   US findings - nephrology appt will be set up. Mood, sleep, appetite ok.  was unable to tolerate CPAP- and returned it after using for a day. Thyroid has been stable on current dose.  has been using advair daily-  has SOB on exertion when she is not taking this. Taking fosamax and tolerating well, no GERD sx. Review of Systems   Constitutional: Negative for activity change, appetite change and fatigue. HENT: Negative for dental problem, ear pain, hearing loss, postnasal drip, sinus pressure, sneezing, tinnitus, trouble swallowing and voice change. Eyes: Negative for pain, discharge, redness and visual disturbance. Respiratory: Negative for cough, chest tightness and shortness of breath. Cardiovascular: Negative for chest pain, palpitations and leg swelling. Gastrointestinal: Negative for abdominal distention, abdominal pain, anal bleeding, blood in stool, constipation, diarrhea, nausea, rectal pain and vomiting. Endocrine: Negative for cold intolerance, heat intolerance, polydipsia, polyphagia and polyuria. Genitourinary: Negative for decreased urine volume, difficulty urinating, dyspareunia, dysuria, enuresis, flank pain, frequency, genital sores, hematuria, menstrual problem, pelvic pain, urgency, vaginal bleeding and vaginal discharge. Musculoskeletal: Negative for arthralgias, back pain, gait problem, joint swelling, myalgias, neck pain and neck stiffness. Skin: Negative for color change, pallor and rash. Allergic/Immunologic: Negative for environmental allergies, food allergies and immunocompromised state. Neurological: Negative for dizziness, tremors, seizures, syncope, facial asymmetry, speech difficulty, weakness, light-headedness, numbness and headaches. Hematological: Negative for adenopathy. Does not bruise/bleed easily. Psychiatric/Behavioral: Negative for agitation, behavioral problems, confusion, decreased concentration, sleep disturbance and suicidal ideas. The patient is not nervous/anxious. Objective:   Physical Exam  Constitutional:       General: She is not in acute distress. HENT:      Head: Normocephalic and atraumatic. Right Ear: External ear normal.      Left Ear: External ear normal.   Eyes:      Conjunctiva/sclera: Conjunctivae normal.      Pupils: Pupils are equal, round, and reactive to light. Neck:      Musculoskeletal: Normal range of motion. Thyroid: No thyromegaly. Trachea: No tracheal deviation. Cardiovascular:      Rate and Rhythm: Normal rate and regular rhythm. Heart sounds: No murmur. No friction rub. No gallop. Pulmonary:      Effort: No respiratory distress. Breath sounds: No stridor. No wheezing or rales. Chest:      Chest wall: No tenderness. Abdominal:      General: Bowel sounds are normal. There is no distension. Palpations: Abdomen is soft. Tenderness: There is no abdominal tenderness. There is no rebound. Musculoskeletal: Normal range of motion. Lymphadenopathy:      Cervical: No cervical adenopathy. Skin:     General: Skin is warm. Coloration: Skin is not pale. Findings: No erythema or rash. Neurological:      Mental Status: She is alert and oriented to person, place, and time. Cranial Nerves: No cranial nerve deficit. Motor: No abnormal muscle tone. Deep Tendon Reflexes: Reflexes normal.         Assessment:       Diagnosis Orders   1.  Abnormal renal ultrasound  [facility]) - Gerardo Banks MD, Nephrology, [facility]   2. Essential hypertension  [facility]) - Gerardo Banks MD, Nephrology, [place]    bisoprolol (ZEBETA) 5 MG tablet   3. ALEJANDRA (generalized anxiety disorder)  PARoxetine (PAXIL) 10 MG tablet   4. Encounter for hepatitis C screening test for low risk patient  Hepatitis C Antibody   5. Hyperthyroidism  T3, Free    T4, Free    TSH without Reflex   6. Mild intermittent asthma without complication     7. Osteopenia of multiple sites     8. Acquired hypothyroidism           Plan:      Orders Placed This Encounter   Procedures    T3, Free    T4, Free    TSH without Reflex    Hepatitis C Antibody    [facility]) - Gerardo Banks MD, Nephrology, [facility]       Outpatient Encounter Medications as of 3/15/2021   Medication Sig Dispense Refill    fluticasone-salmeterol (ADVAIR HFA) 115-21 MCG/ACT inhaler Inhale 2 puffs into the lungs 2 times daily      PARoxetine (PAXIL) 10 MG tablet Take 1 tablet by mouth daily 90 tablet 3    bisoprolol (ZEBETA) 5 MG tablet Take 1 tablet by mouth daily 30 tablet 3    alendronate (FOSAMAX) 35 MG tablet Take 1 tablet by mouth every 7 days 12 tablet 5    Omega-3 1000 MG CAPS Take 900 mg by mouth      fexofenadine (ALLEGRA) 180 MG tablet Take 180 mg by mouth daily      levothyroxine (SYNTHROID) 112 MCG tablet Take 1 tablet by mouth daily 30 tablet 5    albuterol sulfate HFA (PROAIR HFA) 108 (90 BASE) MCG/ACT inhaler Inhale 2 puffs into the lungs every 6 hours as needed for Wheezing 3 Inhaler 3    [DISCONTINUED] PARoxetine (PAXIL) 10 MG tablet Take 1 tablet by mouth every morning 90 tablet 0    [DISCONTINUED] bisoprolol (ZEBETA) 5 MG tablet Take 1 tablet by mouth daily 90 tablet 0    [DISCONTINUED] vitamin C (ASCORBIC ACID) 500 MG tablet Take 1,000 mg by mouth daily       No facility-administered encounter medications on file as of 3/15/2021.             Lurene Essex, MD

## 2021-04-08 ENCOUNTER — HOSPITAL ENCOUNTER (EMERGENCY)
Facility: CLINIC | Age: 51
Discharge: HOME | End: 2021-04-08
Attending: EMERGENCY MEDICINE
Payer: COMMERCIAL

## 2021-04-08 VITALS
TEMPERATURE: 98.7 F | BODY MASS INDEX: 34.2 KG/M2 | HEART RATE: 79 BPM | SYSTOLIC BLOOD PRESSURE: 142 MMHG | RESPIRATION RATE: 15 BRPM | OXYGEN SATURATION: 97 % | DIASTOLIC BLOOD PRESSURE: 77 MMHG | WEIGHT: 193 LBS | HEIGHT: 63 IN

## 2021-04-08 DIAGNOSIS — S61.211A LACERATION OF LEFT INDEX FINGER WITHOUT FOREIGN BODY WITHOUT DAMAGE TO NAIL, INITIAL ENCOUNTER: Primary | ICD-10-CM

## 2021-04-08 PROCEDURE — 12001 RPR S/N/AX/GEN/TRNK 2.5CM/<: CPT

## 2021-04-08 PROCEDURE — 99284 EMERGENCY DEPT VISIT MOD MDM: CPT

## 2021-04-09 NOTE — ED PROVIDER NOTES
[address] ED  EMERGENCY DEPARTMENT ENCOUNTER      Pt Name: Luly Delgado  MRN: [id number]  [place] 1970  Date of evaluation: 2021  Provider: Cesar Dumont MD    [address] [place] [address]     Chief Complaint   Patient presents with    Laceration     left index finger         HISTORY OF PRESENT ILLNESS   (Location/Symptom, Timing/Onset, Context/Setting,Quality, Duration, Modifying Factors, Severity)  Note limiting factors. Luly Delgado is a 44 y.o. female who presents to the emergency department for evaluation of a finger laceration. Patient was slicing fish at home and accidentally cut herself over the dorsum of the left index finger. This occurred just prior to arrival.  She is up-to-date with her tetanus immune status. The history is provided by the patient. Nursing Notes werereviewed. REVIEW OF SYSTEMS    (2-9 systems for level 4, 10 or more for level 5)     Review of Systems   All other systems reviewed and are negative. Except as noted above the remainder of the review of systems was reviewed and negative.        PAST MEDICAL HISTORY     Past Medical History:   Diagnosis Date    Allergic rhinitis     Asthma     Excessive bleeding     Pt denies    GERD (gastroesophageal reflux disease)     Halitosis     Hypertension     Hypothyroidism     Mild intermittent asthma without complication     Obesity 2020    SACHA (obstructive sleep apnea)     Osteoporosis     Other osteoporosis without current pathological fracture 2017    Screen for colon cancer     Urinary retention     Uterine fibroid          SURGICALHISTORY       Past Surgical History:   Procedure Laterality Date     SECTION      PARATHYROID GLAND SURGERY Right 3/27/2020    NECK EXPLORATION AND REMOVAL OF PARATHYROID ADENOMA performed by Channing Mclean MD at [address]  2018    UPPER GASTROINTESTINAL ENDOSCOPY N/A 2019    EGD BIOPSY performed by Anjana Patrick MD at [facility] ENDO         CURRENT MEDICATIONS       Discharge Medication List as of 4/8/2021  8:57 PM      CONTINUE these medications which have NOT CHANGED    Details   fluticasone-salmeterol (ADVAIR HFA) 115-21 MCG/ACT inhaler Inhale 2 puffs into the lungs 2 times dailyHistorical Med      PARoxetine (PAXIL) 10 MG tablet Take 1 tablet by mouth daily, Disp-90 tablet, R-3Normal      bisoprolol (ZEBETA) 5 MG tablet Take 1 tablet by mouth daily, Disp-30 tablet, R-3Normal      alendronate (FOSAMAX) 35 MG tablet Take 1 tablet by mouth every 7 days, Disp-12 tablet, R-5Normal      Omega-3 1000 MG CAPS Take 900 mg by mouthHistorical Med      fexofenadine (ALLEGRA) 180 MG tablet Take 180 mg by mouth dailyHistorical Med      levothyroxine (SYNTHROID) 112 MCG tablet Take 1 tablet by mouth daily, Disp-30 tablet, R-5Normal      albuterol sulfate HFA (PROAIR HFA) 108 (90 BASE) MCG/ACT inhaler Inhale 2 puffs into the lungs every 6 hours as needed for Wheezing, Disp-3 Inhaler, R-3             ALLERGIES     Losartan    FAMILY HISTORY       Family History   Problem Relation Age of Onset    Heart Disease Mother 48    High Blood Pressure Mother 48    Diabetes Father 61    Heart Disease Father 48    High Blood Pressure Father 48    Diabetes Paternal Uncle     Breast Cancer Maternal Grandmother 79        breast     Diabetes Paternal Grandmother     Stroke Neg Hx     Colon Cancer Neg Hx     Uterine Cancer Neg Hx     Ovarian Cancer Neg Hx           SOCIAL HISTORY       Social History     Socioeconomic History    Marital status:      Spouse name: None    Number of children: None    Years of education: None    Highest education level: None   Occupational History    None   Social Needs    Financial resource strain: Not hard at all   [employer] insecurity     Worry: Never true     Inability: Never true   [employer] needs     Medical: None     Non-medical: None   Tobacco Use    Smoking status: Never Smoker    Smokeless tobacco: Never Used   Substance and Sexual Activity    Alcohol use: Yes     Alcohol/week: 0.0 standard drinks     Comment: social    Drug use: No    Sexual activity: Not Currently   Lifestyle    Physical activity     Days per week: None     Minutes per session: None    Stress: None   Relationships    Social connections     Talks on phone: None     Gets together: None     Attends Caodaism service: None     Active member of club or organization: None     Attends meetings of clubs or organizations: None     Relationship status: None    Intimate partner violence     Fear of current or ex partner: None     Emotionally abused: None     Physically abused: None     Forced sexual activity: None   Other Topics Concern    None   Social History Narrative    None       SCREENINGS             PHYSICAL EXAM    (up to 7 for level 4, 8 or more for level 5)     ED Triage Vitals [04/08/21 1958]   BP Temp Temp src Pulse Resp SpO2 Height Weight   (!) 142/77 98.7 °F (37.1 °C) -- 79 15 97 % 5' 3\" (1.6 m) 193 lb (87.5 kg)       Physical Exam  Vitals signs reviewed. Constitutional:       General: She is not in acute distress. Skin:     General: Skin is warm and dry. Comments: Patient has a flap-like V-shaped laceration based distally that is located on the dorsum of the left index finger overlying the DIP joint crease. It does not involve the nail. The total length of this laceration is a little more than 1 cm. Neurological:      Mental Status: She is alert.          DIAGNOSTIC RESULTS     EKG: All EKG's are interpreted by the Emergency Department Physician who either signs orCo-signs this chart in the absence of a cardiologist.    RADIOLOGY:   Non-plain film images such as CT, Ultrasound and MRI are read by the radiologist. Plain radiographic images are visualized and preliminarily interpreted by the emergency physician with the below findings:    Interpretation per the Radiologist below, ifavailable at the time of this note:    No orders to display         ED BEDSIDE ULTRASOUND:   Performed by ED Physician - none    LABS:  Labs Reviewed - No data to display    All other labs were within normal range ornot returned as of this dictation. EMERGENCY DEPARTMENT COURSE and DIFFERENTIAL DIAGNOSIS/MDM:   Vitals:    Vitals:    21   BP: (!) 142/77   Pulse: 79   Resp: 15   Temp: 98.7 °F (37.1 °C)   SpO2: 97%   Weight: 87.5 kg (193 lb)   Height: 5' 3\" (1.6 m)            Laceration repair is carried out and the patient is discharged with wound care instructions. Antibiotics not indicated. MDM    CONSULTS:  None    PROCEDURES:  Unlessotherwise noted below, none     Procedures     The laceration was cleaned with chlorhexidine. The skin flap was lifted to reveal underlying wound which was not deep. Skin edges are nicely opposed and bleeding is easily stopped. Closure was carried out with Dermabond followed by application of Steri-Strips and Band-Aid. FINAL IMPRESSION      1.  Laceration of left index finger without foreign body without damage to nail, initial encounter          DISPOSITION/PLAN   DISPOSITION Decision To Discharge 2021 08:56:53 PM      PATIENT REFERRED TO:  Forrest Humphrey MD  [address] [place] [address], [address]  [address] [address] [phone]            DISCHARGE MEDICATIONS:         Problem List:  Patient Active Problem List   Diagnosis Code    Osteopenia M85.80    Mild intermittent asthma without complication Y46.76    Seasonal allergies J30.2    Gastroesophageal reflux disease K21.9    Hypothyroidism due to Hashimoto's thyroiditis E03.8, E06.3    History of  section Z98.891    Fibroid uterus D25.9    Halitosis R19.6    S/P parathyroidectomy Z98.890    Essential hypertension I10    Acquired hypothyroidism E03.9    Pes anserinus bursitis of right knee M70.51    Perimenopausal N95.1    Obesity E66.9    Closed fracture of right tibial plateau with routine healing S82.141D Summation      Patient Course: Discharged. ED Medicationsadministered this visit:  Medications - No data to display    New Prescriptions from this visit:    Discharge Medication List as of 4/8/2021  8:57 PM          Follow-up:  China Pérez MD  [address] [place] [address], [address]  [facility]  [phone]              Final Impression:   1.  Laceration of left index finger without foreign body without damage to nail, initial encounter               (Please note that portions of this note were completed with a voice recognitionprogram.  Efforts were made to edit the dictations but occasionally words are mis-transcribed.)    Cooper Pelletier MD (electronically signed)  Attending Emergency Physician            Cooper Pelletier MD  04/09/21 0067

## 2021-05-06 ENCOUNTER — TELEPHONE (OUTPATIENT)
Dept: FAMILY MEDICINE CLINIC | Age: 51
End: 2021-05-06

## 2021-11-02 ENCOUNTER — OFFICE VISIT (OUTPATIENT)
Dept: FAMILY MEDICINE CLINIC | Age: 51
End: 2021-11-02
Payer: COMMERCIAL

## 2021-11-02 VITALS
WEIGHT: 208 LBS | BODY MASS INDEX: 36.85 KG/M2 | HEART RATE: 58 BPM | TEMPERATURE: 97.3 F | SYSTOLIC BLOOD PRESSURE: 130 MMHG | OXYGEN SATURATION: 97 % | DIASTOLIC BLOOD PRESSURE: 85 MMHG

## 2021-11-02 DIAGNOSIS — M85.89 OSTEOPENIA OF MULTIPLE SITES: ICD-10-CM

## 2021-11-02 DIAGNOSIS — G47.33 OSA (OBSTRUCTIVE SLEEP APNEA): ICD-10-CM

## 2021-11-02 DIAGNOSIS — E66.09 CLASS 2 OBESITY DUE TO EXCESS CALORIES WITHOUT SERIOUS COMORBIDITY WITH BODY MASS INDEX (BMI) OF 36.0 TO 36.9 IN ADULT: ICD-10-CM

## 2021-11-02 DIAGNOSIS — Z13.1 SCREENING FOR DIABETES MELLITUS: ICD-10-CM

## 2021-11-02 DIAGNOSIS — I10 ESSENTIAL HYPERTENSION: Primary | ICD-10-CM

## 2021-11-02 LAB — HBA1C MFR BLD: 6.4 %

## 2021-11-02 PROCEDURE — 99213 OFFICE O/P EST LOW 20 MIN: CPT | Performed by: FAMILY MEDICINE

## 2021-11-02 PROCEDURE — 83036 HEMOGLOBIN GLYCOSYLATED A1C: CPT | Performed by: FAMILY MEDICINE

## 2021-11-02 ASSESSMENT — PATIENT HEALTH QUESTIONNAIRE - PHQ9
SUM OF ALL RESPONSES TO PHQ QUESTIONS 1-9: 0
SUM OF ALL RESPONSES TO PHQ9 QUESTIONS 1 & 2: 0
SUM OF ALL RESPONSES TO PHQ QUESTIONS 1-9: 0
2. FEELING DOWN, DEPRESSED OR HOPELESS: 0
SUM OF ALL RESPONSES TO PHQ QUESTIONS 1-9: 0
1. LITTLE INTEREST OR PLEASURE IN DOING THINGS: 0

## 2021-11-02 NOTE — PROGRESS NOTES
 Ovarian Cancer Neg Hx      Current Outpatient Medications   Medication Sig Dispense Refill    phentermine (ADIPEX-P) 37.5 MG tablet Take 1 tablet by mouth every morning (before breakfast) for 30 days. 30 tablet 0    alendronate (FOSAMAX) 35 MG tablet Take 1 tablet by mouth every 7 days 12 tablet 5    metFORMIN (GLUCOPHAGE) 500 MG tablet Take 1 tablet by mouth 2 times daily (with meals) 60 tablet 5    bisoprolol (ZEBETA) 5 MG tablet TAKE 1 TABLET BY MOUTH ONE TIME A DAY 90 tablet 2    fluticasone-salmeterol (ADVAIR HFA) 115-21 MCG/ACT inhaler Inhale 2 puffs into the lungs 2 times daily      PARoxetine (PAXIL) 10 MG tablet Take 1 tablet by mouth daily 90 tablet 3    Omega-3 1000 MG CAPS Take 900 mg by mouth      fexofenadine (ALLEGRA) 180 MG tablet Take 180 mg by mouth daily      levothyroxine (SYNTHROID) 112 MCG tablet Take 1 tablet by mouth daily 30 tablet 5    albuterol sulfate HFA (PROAIR HFA) 108 (90 BASE) MCG/ACT inhaler Inhale 2 puffs into the lungs every 6 hours as needed for Wheezing 3 Inhaler 3     No current facility-administered medications for this visit. ALLERGIES:    Allergies   Allergen Reactions    Losartan      Palpitations, sweaty, restless       Social History     Tobacco Use    Smoking status: Never Smoker    Smokeless tobacco: Never Used   Substance Use Topics    Alcohol use: Yes     Alcohol/week: 0.0 standard drinks     Comment: social      Body mass index is 36.85 kg/m². /85   Pulse 58   Temp 97.3 °F (36.3 °C)   Wt 208 lb (94.3 kg)   LMP 02/17/2020   SpO2 97%   BMI 36.85 kg/m²     Subjective:      HPI    46 y.o. female coming today with weight concerns. She states that she needs to have a jumpstart. She notes that she gained too much weight and she actually wants to lose weight. She has not tolerated CPAP. She is on blood pressure medication. Review of Systems   Constitutional: Negative for fever and unexpected weight change.    Pertinent items are noted Medications    metFORMIN (GLUCOPHAGE) 500 MG tablet     Sig: Take 1 tablet by mouth 2 times daily (with meals)     Dispense:  60 tablet     Refill:  5    phentermine (ADIPEX-P) 37.5 MG tablet     Sig: Take 1 tablet by mouth every morning (before breakfast) for 30 days. Dispense:  30 tablet     Refill:  0    alendronate (FOSAMAX) 35 MG tablet     Sig: Take 1 tablet by mouth every 7 days     Dispense:  12 tablet     Refill:  5       Call or return to clinic prn if these symptoms worsen or fail to improve as anticipated. I have reviewed the instructions with the patient, answering all questions to patient's satisfaction. Pineda Skinner received counseling on the following healthy behaviors: nutrition, exercise, and medication adherence  Reviewed prior labs and health maintenance. Continue current medications, diet and exercise. Discussed use, benefit, and side effects of prescribed medications. Barriers to medication compliance addressed. Patient given educational materials - see patient instructions. All patient questions answered. Patient voiced understanding.       Electronically signed by Jeevan Smart MD on 11/3/2021 at 6:39 AM       (Please note that portions of this note were completed with a voice recognition program. Efforts were made to edit the dictations but occasionally words are mis-transcribed.)

## 2021-11-02 NOTE — PATIENT INSTRUCTIONS
Patient Education        lorcaserin  Pronunciation:  nolan DORIS er in  Brand:  Belviq, Belviq XR  What is the most important information I should know about lorcaserin? Do not use lorcaserin if you are pregnant. Weight loss during pregnancy can harm an unborn baby. Serious drug interactions can occur when certain medicines are used together with lorcaserin. Tell each of your healthcare providers about all medicines you use now, and any medicine you start or stop using. What is lorcaserin? Lorcaserin is used together with diet and exercise to treat obesity. Lorcaserin affects appetite signals in the brain, helping you feel full with smaller meals. Lorcaserin is sometimes used to treat obesity that may be related to diabetes, high cholesterol, or high blood pressure. Lorcaserin will not treat any underlying health condition (such as heart disease, diabetes, high blood pressure). Keep taking any other medications your doctor has prescribed to treat these conditions. Lorcaserin may also be used for purposes not listed in this medication guide. What should I discuss with my healthcare provider before taking lorcaserin? You should not use lorcaserin if you are allergic to it. Do not use lorcaserin if you are pregnant. Weight loss during pregnancy can harm an unborn baby, even if you are overweight. Tell your doctor right away if you become pregnant during treatment. Tell your doctor if you have ever had:  · diabetes;  · congestive heart failure;  · a heart valve disorder;  · slow heartbeats or heart block (especially 2nd or 3rd degree \"AV block\");  · sickle cell anemia;  · leukemia or myeloma;  · kidney or liver disease; or  · a physical deformity of the penis (such as Peyronie's disease). In clinical studies, more people taking lorcaserin were diagnosed with cancer compared with people taking an inactive placebo. It is not clear whether lorcaserin actually causes cancer.   Talk to your doctor about your own without your doctor's advice. What are the possible side effects of lorcaserin? Get emergency medical help if you have signs of an allergic reaction: hives; difficult breathing; swelling of your face, lips, tongue, or throat. Stop using lorcaserin and call your doctor at once if you have:  · unusual changes in mood or behavior, thoughts of suicide or hurting yourself;  · dry eyes, blurred vision;  · feelings of standing next to yourself or being outside of your body;  · memory problems, trouble concentrating;  · breast swelling (in women or men), nipple discharge;  · penis erection that is painful or lasts longer than 4 hours;  · heart problems --fast heart rate, trouble breathing, dizziness, ongoing weakness, or swelling in your arms, hands, legs, or feet;  · high levels of serotonin in the body --agitation, hallucinations, fever, fast heart rate, overactive reflexes, nausea, vomiting, diarrhea, loss of coordination, fainting; or  · severe nervous system reaction --very stiff (rigid) muscles, high fever, sweating, confusion, fast or uneven heartbeats, tremors, feeling like you might pass out. Common side effects may include:  · headache, dizziness, feeling tired;  · dry mouth, cough;  · nausea, constipation;  · back pain; or  · low blood sugar (in people with diabetes). This is not a complete list of side effects and others may occur. Call your doctor for medical advice about side effects. You may report side effects to FDA at 5-564-FDA-8504. What other drugs will affect lorcaserin? Tell your doctor about all your current medicines.  Many drugs can affect lorcaserin, especially:  · cabergoline;  · linezolid;  · lithium;  · Darrell's wort;  · tramadol;  · tryptophan (also called L-tryptophan);  · any type of antidepressant;  · cold or cough medicine that contains dextromethorphan (a cough suppressant);  · erectile dysfunction medicine--avanafil, sildenafil, tadalafil, Cialis, Levitra, Viagra, and others;  · medicine to treat mental illness, anxiety, or a mood disorder; or  · a \"triptan\" migraine headache medicine--such as almotriptan, frovatriptan, rizatriptan, sumatriptan, zolmitriptan, Imitrex, Maxalt, Zomig, and others. This list is not complete and many other drugs may affect lorcaserin. This includes prescription and over-the-counter medicines, vitamins, and herbal products. Not all possible drug interactions are listed here. Where can I get more information? Your pharmacist can provide more information about lorcaserin. Remember, keep this and all other medicines out of the reach of children, never share your medicines with others, and use this medication only for the indication prescribed. Every effort has been made to ensure that the information provided by [address] is accurate, up-to-date, and complete, but no guarantee is made to that effect. Drug information contained herein may be time sensitive. [facility] information has been compiled for use by healthcare practitioners and consumers in the [place] and therefore [facility] does not warrant that uses outside of the [place] are appropriate, unless specifically indicated otherwise. [facility]'s drug information does not endorse drugs, diagnose patients or recommend therapy. [facility]'s drug information is an informational resource designed to assist licensed healthcare practitioners in caring for their patients and/or to serve consumers viewing this service as a supplement to, and not a substitute for, the expertise, skill, knowledge and judgment of healthcare practitioners. The absence of a warning for a given drug or drug combination in no way should be construed to indicate that the drug or drug combination is safe, effective or appropriate for any given patient. [facility] does not assume any responsibility for any aspect of healthcare administered with the aid of information [facility] provides.  The information contained herein is not intended to cover all possible uses, directions, precautions, warnings, drug interactions, allergic reactions, or adverse effects. If you have questions about the drugs you are taking, check with your doctor, nurse or pharmacist.  Copyright 3244-2568 [address]: 4.01. Revision date: 1/22/2020. Care instructions adapted under license by [facility] ([facility]). If you have questions about a medical condition or this instruction, always ask your healthcare professional. [place] [address] any warranty or liability for your use of this information.

## 2021-11-03 RX ORDER — ALENDRONATE SODIUM 35 MG/1
35 TABLET ORAL
Qty: 12 TABLET | Refills: 5 | Status: SHIPPED | OUTPATIENT
Start: 2021-11-03 | End: 2022-04-11 | Stop reason: SDUPTHER

## 2021-11-03 RX ORDER — PHENTERMINE HYDROCHLORIDE 37.5 MG/1
37.5 TABLET ORAL
Qty: 30 TABLET | Refills: 0 | Status: SHIPPED | OUTPATIENT
Start: 2021-11-03 | End: 2021-12-03

## 2021-11-10 ENCOUNTER — HOSPITAL ENCOUNTER (OUTPATIENT)
Age: 51
Setting detail: SPECIMEN
Discharge: HOME | End: 2021-11-10
Payer: COMMERCIAL

## 2021-11-10 ENCOUNTER — OFFICE VISIT (OUTPATIENT)
Dept: OBGYN CLINIC | Age: 51
End: 2021-11-10
Payer: COMMERCIAL

## 2021-11-10 VITALS — SYSTOLIC BLOOD PRESSURE: 122 MMHG | DIASTOLIC BLOOD PRESSURE: 72 MMHG | BODY MASS INDEX: 35.96 KG/M2 | WEIGHT: 203 LBS

## 2021-11-10 DIAGNOSIS — Z12.31 VISIT FOR SCREENING MAMMOGRAM: ICD-10-CM

## 2021-11-10 DIAGNOSIS — Z01.419 ENCOUNTER FOR GYNECOLOGICAL EXAMINATION WITHOUT ABNORMAL FINDING: Primary | ICD-10-CM

## 2021-11-10 PROCEDURE — 99396 PREV VISIT EST AGE 40-64: CPT | Performed by: OBSTETRICS & GYNECOLOGY

## 2021-11-10 ASSESSMENT — ENCOUNTER SYMPTOMS
CONSTIPATION: 0
DIARRHEA: 0
WHEEZING: 0
COUGH: 0
VOMITING: 0
NAUSEA: 0
ABDOMINAL PAIN: 0

## 2021-11-10 NOTE — PROGRESS NOTES
[facility] PHYSICIANS  [facility] [address]  [address]  [address] [place]  [address]  Dept: [phone]  OF VISIT:  11/10/21        History and Physical    Dulce Bobby    :  1970  CHIEF COMPLAINT:    Chief Complaint   Patient presents with    Annual Exam     Here for annual Last pap 06/22/15 neg hpv neg   Last mammogram  neg                     HPI :   Dulce Bobby is a 46 y.o. female    The patient was seen and examined. Per the patient bowels are regular. She has novoiding complaints. She denies any bloating as well as vaginal discharge. LMP 2020. Being treated for osteoporosis with Fosamax. Had been having some hot flushes when menopause first started which have lessened. Is on Paxil per her PCP. She is wondering about HRT in the setting of osteoporosis. She also has HTN (on [place]).    _____________________________________________________________________  Past Medical History:   Diagnosis Date    Allergic rhinitis     Asthma     Excessive bleeding     Pt denies    GERD (gastroesophageal reflux disease)     Halitosis     Hypertension     Hypothyroidism     Mild intermittent asthma without complication 8/10/8097    Obesity 2020    SACHA (obstructive sleep apnea)     Osteoporosis     Other osteoporosis without current pathological fracture 2017    Screen for colon cancer     Urinary retention     Uterine fibroid                                                                    Past Surgical History:   Procedure Laterality Date     SECTION      PARATHYROID GLAND SURGERY Right 3/27/2020    NECK EXPLORATION AND REMOVAL OF PARATHYROID ADENOMA performed by Ismael Guthrie MD at [address]  2018    UPPER GASTROINTESTINAL ENDOSCOPY N/A 2019    EGD BIOPSY performed by Chau Paulino MD at [facility]     Family History   Problem Relation Age of Onset    Heart Disease Mother 48    High Blood Pressure Mother 48    Diabetes Father 61    Heart Disease Father 48    High Blood Pressure Father 48    Diabetes Paternal Uncle     Breast Cancer Maternal Grandmother 79        breast     Diabetes Paternal Grandmother     Stroke Neg Hx     Colon Cancer Neg Hx     Uterine Cancer Neg Hx     Ovarian Cancer Neg Hx      Social History     Tobacco Use   Smoking Status Never Smoker   Smokeless Tobacco Never Used     Social History     Substance and Sexual Activity   Alcohol Use Yes    Alcohol/week: 0.0 standard drinks    Comment: social     Current Outpatient Medications   Medication Sig Dispense Refill    phentermine (ADIPEX-P) 37.5 MG tablet Take 1 tablet by mouth every morning (before breakfast) for 30 days. 30 tablet 0    alendronate (FOSAMAX) 35 MG tablet Take 1 tablet by mouth every 7 days 12 tablet 5    metFORMIN (GLUCOPHAGE) 500 MG tablet Take 1 tablet by mouth 2 times daily (with meals) 60 tablet 5    bisoprolol (ZEBETA) 5 MG tablet TAKE 1 TABLET BY MOUTH ONE TIME A DAY 90 tablet 2    fluticasone-salmeterol (ADVAIR HFA) 115-21 MCG/ACT inhaler Inhale 2 puffs into the lungs 2 times daily      PARoxetine (PAXIL) 10 MG tablet Take 1 tablet by mouth daily 90 tablet 3    Omega-3 1000 MG CAPS Take 900 mg by mouth      fexofenadine (ALLEGRA) 180 MG tablet Take 180 mg by mouth daily      levothyroxine (SYNTHROID) 112 MCG tablet Take 1 tablet by mouth daily 30 tablet 5    albuterol sulfate HFA (PROAIR HFA) 108 (90 BASE) MCG/ACT inhaler Inhale 2 puffs into the lungs every 6 hours as needed for Wheezing 3 Inhaler 3     No current facility-administered medications for this visit. Allergies:  Losartan    Gynecologic History:  Patient's last menstrual period was 2020.   Sexually Active: Yes  STD History: Yes   Pap smear history: negative       OB History    Para Term  AB Living   2 2 2 0 0 2   SAB IAB Ectopic Molar Multiple Live Births   0 0 0 0 0 0 ______________________________________________________________________  REVIEW OF SYSTEMS:  Review of Systems   Constitutional: Negative for chills and fever. HENT: Negative for hearing loss. Respiratory: Negative for cough and wheezing. Cardiovascular: Negative for chest pain and palpitations. Gastrointestinal: Negative for abdominal pain, constipation, diarrhea, nausea and vomiting. Genitourinary: Negative for dysuria, frequency and urgency. Musculoskeletal: Negative for myalgias. Skin: Negative for rash. Neurological: Negative for dizziness, weakness and headaches. Hematological: Does not bruise/bleed easily. Psychiatric/Behavioral: Negative for suicidal ideas. Wt 203 lb (92.1 kg)   LMP 03/01/2020   BMI 35.96 kg/m²                    Physical Exam:     Physical Exam  Constitutional:       Appearance: She is well-developed. HENT:      Head: Normocephalic. Neck:      Thyroid: No thyromegaly. Vascular: No JVD. Cardiovascular:      Rate and Rhythm: Normal rate and regular rhythm. Pulmonary:      Effort: Pulmonary effort is normal. No respiratory distress. Breath sounds: Normal breath sounds. No wheezing or rales. Chest:      Chest wall: No tenderness. Breasts: Breasts are symmetrical.      Right: No inverted nipple, mass, nipple discharge, skin change or tenderness. Left: No inverted nipple, mass, nipple discharge, skin change or tenderness. Abdominal:      General: Bowel sounds are normal. There is no distension. Palpations: Abdomen is soft. Tenderness: There is no abdominal tenderness. Genitourinary:     Exam position: Supine. Labia:         Right: No rash, tenderness, lesion or injury. Left: No rash, tenderness, lesion or injury. Vagina: Normal. No foreign body. No erythema or bleeding. Cervix: No cervical motion tenderness, discharge or friability.       Uterus: Not deviated, not enlarged, not fixed and not tender. Adnexa:         Right: No mass, tenderness or fullness. Left: No mass, tenderness or fullness. Musculoskeletal:         General: Normal range of motion. Lymphadenopathy:      Cervical: No cervical adenopathy. Skin:     General: Skin is warm and dry. Neurological:      Mental Status: She is alert and oriented to person, place, and time. Deep Tendon Reflexes: Reflexes are normal and symmetric. Psychiatric:         Behavior: Behavior normal.         Thought Content: Thought content normal.         Judgment: Judgment normal.             ASSESSMENT:        46 y.o. Female; Annual   Diagnosis Orders   1. Encounter for gynecological examination without abnormal finding     2. Visit for screening mammogram  HAROON DIGITAL SCREEN W OR WO CAD BILATERAL     No follow-ups on file. Hereditary Breast, Ovarian,Colon and Uterine Cancer screening Done. Tobacco & Secondary smoke risks reviewed; instructed oncessation and avoidance    PLAN:  - Pap collected and sent for co-testing per ASCCP guidelines. -Menopause symptoms discussed. Reviewed all options for HRT. Recommended primrose oil or black cohosh for hot flushes, and estrogen only if hot flushes are refractory to non-hormonal options. Discussed that estrogen is used for prevention, not treatment, of osteoporosis, so would not recommend HRT unless it's for hot flushes. - Screening mammogram discussed and advised yearly if normal starting at age 36.  - Calcium and Vitamin D dosing reviewed. - Colonoscopy screening reviewed. -General diet and exercise reviewed. - Routine health maintenance per patients PCP.     Electronically signed by Chloe Lai MD on 11/10/2021at 9:29 AM  [place]

## 2021-11-13 LAB
HPV SAMPLE: NORMAL
HPV, GENOTYPE 16: NOT DETECTED
HPV, GENOTYPE 18: NOT DETECTED
HPV, HIGH RISK OTHER: NOT DETECTED
HPV, INTERPRETATION: NORMAL
SPECIMEN DESCRIPTION: NORMAL

## 2021-11-16 ENCOUNTER — HOSPITAL ENCOUNTER (OUTPATIENT)
Dept: MAMMOGRAPHY | Age: 51
Discharge: HOME | End: 2021-11-18
Payer: COMMERCIAL

## 2021-11-16 VITALS — HEIGHT: 63 IN | BODY MASS INDEX: 35.97 KG/M2 | WEIGHT: 203 LBS

## 2021-11-16 DIAGNOSIS — Z12.31 VISIT FOR SCREENING MAMMOGRAM: ICD-10-CM

## 2021-11-16 PROCEDURE — 77063 BREAST TOMOSYNTHESIS BI: CPT

## 2021-11-18 LAB — CYTOLOGY REPORT: NORMAL

## 2021-12-06 ENCOUNTER — HOSPITAL ENCOUNTER (OUTPATIENT)
Age: 51
Discharge: HOME | End: 2021-12-06

## 2021-12-06 PROCEDURE — 86480 TB TEST CELL IMMUN MEASURE: CPT

## 2021-12-08 LAB
QUANTI TB GOLD PLUS: NEGATIVE
QUANTI TB1 MINUS NIL: 0.02 IU/ML (ref 0–0.34)
QUANTI TB2 MINUS NIL: 0.04 IU/ML (ref 0–0.34)
QUANTIFERON MITOGEN: 7.95 IU/ML
QUANTIFERON NIL: 0.02 IU/ML

## 2022-01-06 ENCOUNTER — HOSPITAL ENCOUNTER (OUTPATIENT)
Age: 52
Setting detail: SPECIMEN
Discharge: HOME | End: 2022-01-06

## 2022-01-06 DIAGNOSIS — B34.9 VIRAL INFECTION: ICD-10-CM

## 2022-01-06 DIAGNOSIS — J06.9 VIRAL URI: Primary | ICD-10-CM

## 2022-01-07 ENCOUNTER — TELEMEDICINE (OUTPATIENT)
Dept: FAMILY MEDICINE CLINIC | Age: 52
End: 2022-01-07
Payer: COMMERCIAL

## 2022-01-07 DIAGNOSIS — B34.9 ACUTE VIRAL SYNDROME: ICD-10-CM

## 2022-01-07 DIAGNOSIS — E89.2 S/P PARATHYROIDECTOMY (HCC): ICD-10-CM

## 2022-01-07 DIAGNOSIS — E06.3 HYPOTHYROIDISM DUE TO HASHIMOTO'S THYROIDITIS: Primary | ICD-10-CM

## 2022-01-07 DIAGNOSIS — J45.20 MILD INTERMITTENT ASTHMA WITHOUT COMPLICATION: ICD-10-CM

## 2022-01-07 DIAGNOSIS — R73.9 HYPERGLYCEMIA: ICD-10-CM

## 2022-01-07 DIAGNOSIS — E66.09 CLASS 2 OBESITY DUE TO EXCESS CALORIES WITHOUT SERIOUS COMORBIDITY WITH BODY MASS INDEX (BMI) OF 36.0 TO 36.9 IN ADULT: ICD-10-CM

## 2022-01-07 DIAGNOSIS — E55.9 VITAMIN D DEFICIENCY: ICD-10-CM

## 2022-01-07 DIAGNOSIS — Z11.59 ENCOUNTER FOR HEPATITIS C SCREENING TEST FOR LOW RISK PATIENT: ICD-10-CM

## 2022-01-07 DIAGNOSIS — I10 ESSENTIAL HYPERTENSION: ICD-10-CM

## 2022-01-07 DIAGNOSIS — B34.9 VIRAL INFECTION: ICD-10-CM

## 2022-01-07 DIAGNOSIS — E03.8 HYPOTHYROIDISM DUE TO HASHIMOTO'S THYROIDITIS: Primary | ICD-10-CM

## 2022-01-07 DIAGNOSIS — Z13.220 SCREENING, LIPID: ICD-10-CM

## 2022-01-07 PROBLEM — S82.141D CLOSED FRACTURE OF RIGHT TIBIAL PLATEAU WITH ROUTINE HEALING: Status: RESOLVED | Noted: 2020-12-16 | Resolved: 2022-01-07

## 2022-01-07 PROCEDURE — 99214 OFFICE O/P EST MOD 30 MIN: CPT | Performed by: FAMILY MEDICINE

## 2022-01-07 RX ORDER — PAROXETINE HYDROCHLORIDE 20 MG/1
TABLET, FILM COATED ORAL
COMMUNITY
End: 2022-01-07 | Stop reason: DRUGHIGH

## 2022-01-07 ASSESSMENT — ENCOUNTER SYMPTOMS
ABDOMINAL DISTENTION: 0
COLOR CHANGE: 0
EYE PAIN: 0
BACK PAIN: 0
NAUSEA: 1
TROUBLE SWALLOWING: 0
CHEST TIGHTNESS: 0
EYE DISCHARGE: 0
DIARRHEA: 1
CONSTIPATION: 0
COUGH: 0
SHORTNESS OF BREATH: 0
VOICE CHANGE: 0
ANAL BLEEDING: 0
ABDOMINAL PAIN: 0
RECTAL PAIN: 0
SINUS PRESSURE: 0
EYE REDNESS: 0
VOMITING: 0
BLOOD IN STOOL: 0

## 2022-01-07 ASSESSMENT — PATIENT HEALTH QUESTIONNAIRE - PHQ9
SUM OF ALL RESPONSES TO PHQ QUESTIONS 1-9: 0
SUM OF ALL RESPONSES TO PHQ QUESTIONS 1-9: 0
SUM OF ALL RESPONSES TO PHQ9 QUESTIONS 1 & 2: 0
1. LITTLE INTEREST OR PLEASURE IN DOING THINGS: 0
SUM OF ALL RESPONSES TO PHQ QUESTIONS 1-9: 0
2. FEELING DOWN, DEPRESSED OR HOPELESS: 0
SUM OF ALL RESPONSES TO PHQ QUESTIONS 1-9: 0

## 2022-01-07 NOTE — PROGRESS NOTES
Subjective:      Patient ID: Austin Wilkinson is a 46 y.o. female. VV with patient at home and me at office. States started with sudden onset dizziness on waking up. States started with a fever when she took at work. She had to leave work at 11.00 am as she was feeling weak. States CP, scratchy throat, diarrhea overnight , nausea  States no cough, no SOB. This am temp is 100.4 but yesterday went up to 103-104. Loss of taste , smell ok. HR 73 and Pulse Ox is 97 %  States had covid test done at work. Results are pending. Was exposed to a nurse who had 2 kids with covid positive last week. Review of Systems   Constitutional: Positive for activity change, appetite change, fatigue and fever. HENT: Negative for dental problem, ear pain, hearing loss, postnasal drip, sinus pressure, sneezing, tinnitus, trouble swallowing and voice change. Eyes: Negative for pain, discharge, redness and visual disturbance. Respiratory: Negative for cough, chest tightness and shortness of breath. Cardiovascular: Negative for chest pain, palpitations and leg swelling. Gastrointestinal: Positive for diarrhea and nausea. Negative for abdominal distention, abdominal pain, anal bleeding, blood in stool, constipation, rectal pain and vomiting. Endocrine: Negative for cold intolerance, heat intolerance, polydipsia, polyphagia and polyuria. Genitourinary: Negative for decreased urine volume, difficulty urinating, dyspareunia, dysuria, enuresis, flank pain, frequency, genital sores, hematuria, menstrual problem, pelvic pain, urgency, vaginal bleeding and vaginal discharge. Musculoskeletal: Negative for arthralgias, back pain, gait problem, joint swelling, myalgias, neck pain and neck stiffness. Skin: Negative for color change, pallor and rash. Allergic/Immunologic: Negative for environmental allergies, food allergies and immunocompromised state. Neurological: Positive for dizziness.  Negative for tremors, seizures, syncope, facial asymmetry, speech difficulty, weakness, light-headedness, numbness and headaches. Hematological: Negative for adenopathy. Does not bruise/bleed easily. Psychiatric/Behavioral: Negative for agitation, behavioral problems, confusion, decreased concentration, sleep disturbance and suicidal ideas. The patient is not nervous/anxious. Objective:   Physical Exam  Constitutional:       General: She is not in acute distress. HENT:      Head: Normocephalic and atraumatic. Neck:      Thyroid: No thyromegaly. Trachea: No tracheal deviation. Cardiovascular:      Heart sounds: No murmur heard. No friction rub. No gallop. Pulmonary:      Effort: Pulmonary effort is normal. No respiratory distress. Breath sounds: No stridor. No wheezing or rales. Chest:      Chest wall: No tenderness. Abdominal:      General: There is no distension. Tenderness: There is no abdominal tenderness. There is no rebound. Musculoskeletal:         General: Normal range of motion. Cervical back: Normal range of motion. Lymphadenopathy:      Cervical: No cervical adenopathy. Skin:     Coloration: Skin is not pale. Findings: No erythema or rash. Neurological:      General: No focal deficit present. Mental Status: She is alert and oriented to person, place, and time. Mental status is at baseline. Cranial Nerves: No cranial nerve deficit. Motor: No abnormal muscle tone. Deep Tendon Reflexes: Reflexes normal.   Psychiatric:         Mood and Affect: Mood normal.         Behavior: Behavior normal.         Thought Content: Thought content normal.         Judgment: Judgment normal.          There were no vitals filed for this visit. Assessment:      Diagnosis Orders   1. Hypothyroidism due to Hashimoto's thyroiditis  T4, Free    T3, Free    TSH without Reflex   2. Acute viral syndrome  CBC With Auto Differential   3. Mild intermittent asthma without complication     4. S/P parathyroidectomy ([facility] [address]     5. Essential hypertension     6. Class 2 obesity due to excess calories without serious comorbidity with body mass index (BMI) of 36.0 to 36.9 in adult  Insulin, Free   7. Hyperglycemia  Hemoglobin A1C   8. Screening, lipid  Comprehensive Metabolic Panel    Lipid Panel   9. Vitamin D deficiency  Vitamin D 25 Hydroxy   10. Encounter for hepatitis C screening test for low risk patient  Hepatitis C Antibody         Plan:     Orders Placed This Encounter   Procedures    Comprehensive Metabolic Panel    Hemoglobin A1C    Lipid Panel    T4, Free    T3, Free    TSH without Reflex    Vitamin D 25 Hydroxy    Insulin, Free    CBC With Auto Differential    Hepatitis C Antibody       Outpatient Encounter Medications as of 1/7/2022   Medication Sig Dispense Refill    alendronate (FOSAMAX) 35 MG tablet Take 1 tablet by mouth every 7 days 12 tablet 5    metFORMIN (GLUCOPHAGE) 500 MG tablet Take 1 tablet by mouth 2 times daily (with meals) 60 tablet 5    bisoprolol (ZEBETA) 5 MG tablet TAKE 1 TABLET BY MOUTH ONE TIME A DAY 90 tablet 2    fluticasone-salmeterol (ADVAIR HFA) 115-21 MCG/ACT inhaler Inhale 2 puffs into the lungs 2 times daily      PARoxetine (PAXIL) 10 MG tablet Take 1 tablet by mouth daily 90 tablet 3    Omega-3 1000 MG CAPS Take 900 mg by mouth      fexofenadine (ALLEGRA) 180 MG tablet Take 180 mg by mouth daily      levothyroxine (SYNTHROID) 112 MCG tablet Take 1 tablet by mouth daily 30 tablet 5    albuterol sulfate HFA (PROAIR HFA) 108 (90 BASE) MCG/ACT inhaler Inhale 2 puffs into the lungs every 6 hours as needed for Wheezing 3 Inhaler 3    [DISCONTINUED] PARoxetine (PAXIL) 20 MG tablet        No facility-administered encounter medications on file as of 1/7/2022.     15 minutes spent with patient counseling/educating on acute/chronic medical health problems, medications,  along with treatment options.   Reviewed multiple labs/imaging/medications with patient during this time. Following Diet discussion/education was done on healthy diet, vit C and zinc supplements, fluids . In addition Exercise plan and depression screen were discussed. Recent labs/imaging were discussed and reviewed with patient.

## 2022-01-08 LAB
SARS-COV-2: ABNORMAL
SARS-COV-2: DETECTED
SOURCE: ABNORMAL

## 2022-02-10 DIAGNOSIS — R05.3 PERSISTENT COUGH: Primary | ICD-10-CM

## 2022-02-10 RX ORDER — TRIAMCINOLONE ACETONIDE 1 MG/G
CREAM TOPICAL
Qty: 453.6 G | Refills: 3 | Status: SHIPPED | OUTPATIENT
Start: 2022-02-10 | End: 2022-04-11 | Stop reason: ALTCHOICE

## 2022-02-10 RX ORDER — AZITHROMYCIN 250 MG/1
250 TABLET, FILM COATED ORAL SEE ADMIN INSTRUCTIONS
Qty: 6 TABLET | Refills: 0 | Status: SHIPPED | OUTPATIENT
Start: 2022-02-10 | End: 2022-02-15

## 2022-03-09 DIAGNOSIS — F41.1 GAD (GENERALIZED ANXIETY DISORDER): ICD-10-CM

## 2022-03-09 DIAGNOSIS — I10 ESSENTIAL HYPERTENSION: ICD-10-CM

## 2022-03-09 RX ORDER — BISOPROLOL FUMARATE 5 MG/1
TABLET ORAL
Qty: 90 TABLET | Refills: 2 | Status: SHIPPED | OUTPATIENT
Start: 2022-03-09 | End: 2022-06-09 | Stop reason: SDUPTHER

## 2022-03-09 RX ORDER — PAROXETINE 10 MG/1
TABLET, FILM COATED ORAL
Qty: 90 TABLET | Refills: 3 | Status: SHIPPED | OUTPATIENT
Start: 2022-03-09 | End: 2022-06-09 | Stop reason: SDUPTHER

## 2022-04-11 ENCOUNTER — OFFICE VISIT (OUTPATIENT)
Dept: FAMILY MEDICINE CLINIC | Age: 52
End: 2022-04-11
Payer: COMMERCIAL

## 2022-04-11 VITALS
OXYGEN SATURATION: 99 % | HEART RATE: 61 BPM | HEIGHT: 63 IN | SYSTOLIC BLOOD PRESSURE: 114 MMHG | WEIGHT: 210.4 LBS | DIASTOLIC BLOOD PRESSURE: 71 MMHG | TEMPERATURE: 97.2 F | BODY MASS INDEX: 37.28 KG/M2

## 2022-04-11 DIAGNOSIS — I10 ESSENTIAL HYPERTENSION: ICD-10-CM

## 2022-04-11 DIAGNOSIS — J45.20 MILD INTERMITTENT ASTHMA WITHOUT COMPLICATION: ICD-10-CM

## 2022-04-11 DIAGNOSIS — K21.9 GASTROESOPHAGEAL REFLUX DISEASE, UNSPECIFIED WHETHER ESOPHAGITIS PRESENT: ICD-10-CM

## 2022-04-11 DIAGNOSIS — M81.0 OSTEOPOROSIS WITHOUT CURRENT PATHOLOGICAL FRACTURE, UNSPECIFIED OSTEOPOROSIS TYPE: Primary | ICD-10-CM

## 2022-04-11 DIAGNOSIS — E89.2 S/P PARATHYROIDECTOMY (HCC): ICD-10-CM

## 2022-04-11 DIAGNOSIS — M85.89 OSTEOPENIA OF MULTIPLE SITES: ICD-10-CM

## 2022-04-11 DIAGNOSIS — E66.9 CLASS 2 OBESITY WITH BODY MASS INDEX (BMI) OF 37.0 TO 37.9 IN ADULT, UNSPECIFIED OBESITY TYPE, UNSPECIFIED WHETHER SERIOUS COMORBIDITY PRESENT: ICD-10-CM

## 2022-04-11 DIAGNOSIS — J30.2 SEASONAL ALLERGIES: ICD-10-CM

## 2022-04-11 DIAGNOSIS — E03.9 ACQUIRED HYPOTHYROIDISM: ICD-10-CM

## 2022-04-11 PROCEDURE — 99214 OFFICE O/P EST MOD 30 MIN: CPT | Performed by: FAMILY MEDICINE

## 2022-04-11 RX ORDER — ALENDRONATE SODIUM 35 MG/1
35 TABLET ORAL
Qty: 4 TABLET | Refills: 3 | Status: SHIPPED
Start: 2022-04-11 | End: 2022-04-18 | Stop reason: DRUGHIGH

## 2022-04-11 SDOH — ECONOMIC STABILITY: FOOD INSECURITY: WITHIN THE PAST 12 MONTHS, THE FOOD YOU BOUGHT JUST DIDN'T LAST AND YOU DIDN'T HAVE MONEY TO GET MORE.: NEVER TRUE

## 2022-04-11 SDOH — ECONOMIC STABILITY: FOOD INSECURITY: WITHIN THE PAST 12 MONTHS, YOU WORRIED THAT YOUR FOOD WOULD RUN OUT BEFORE YOU GOT MONEY TO BUY MORE.: NEVER TRUE

## 2022-04-11 ASSESSMENT — ENCOUNTER SYMPTOMS
EYE REDNESS: 0
RECTAL PAIN: 0
COUGH: 0
SHORTNESS OF BREATH: 0
COLOR CHANGE: 0
VOICE CHANGE: 0
ABDOMINAL DISTENTION: 0
BACK PAIN: 0
DIARRHEA: 0
EYE PAIN: 0
ABDOMINAL PAIN: 0
EYE DISCHARGE: 0
CHEST TIGHTNESS: 0
ANAL BLEEDING: 0
CONSTIPATION: 0
TROUBLE SWALLOWING: 0
SINUS PRESSURE: 0
BLOOD IN STOOL: 0
NAUSEA: 0
VOMITING: 0

## 2022-04-11 ASSESSMENT — PATIENT HEALTH QUESTIONNAIRE - PHQ9
SUM OF ALL RESPONSES TO PHQ QUESTIONS 1-9: 0
1. LITTLE INTEREST OR PLEASURE IN DOING THINGS: 0
2. FEELING DOWN, DEPRESSED OR HOPELESS: 0
SUM OF ALL RESPONSES TO PHQ QUESTIONS 1-9: 0
SUM OF ALL RESPONSES TO PHQ9 QUESTIONS 1 & 2: 0

## 2022-04-11 ASSESSMENT — SOCIAL DETERMINANTS OF HEALTH (SDOH): HOW HARD IS IT FOR YOU TO PAY FOR THE VERY BASICS LIKE FOOD, HOUSING, MEDICAL CARE, AND HEATING?: NOT HARD AT ALL

## 2022-04-11 NOTE — PROGRESS NOTES
Subjective:      Patient ID: Dione Dancer is a 46 y.o. female. States she is concerned about weight and would like to try adipex or other   Mood, sleep ,appetite ok. States mood ok as well. Bowels are regular. Taking fosamax, as well as calcium and Vit D. States asthma occasionally with exercise. States allergies controlled with allegra. Review of Systems   Constitutional: Negative for activity change, appetite change and fatigue. HENT: Negative for dental problem, ear pain, hearing loss, postnasal drip, sinus pressure, sneezing, tinnitus, trouble swallowing and voice change. Eyes: Negative for pain, discharge, redness and visual disturbance. Respiratory: Negative for cough, chest tightness and shortness of breath. Cardiovascular: Negative for chest pain, palpitations and leg swelling. Gastrointestinal: Negative for abdominal distention, abdominal pain, anal bleeding, blood in stool, constipation, diarrhea, nausea, rectal pain and vomiting. Endocrine: Negative for cold intolerance, heat intolerance, polydipsia, polyphagia and polyuria. Genitourinary: Negative for decreased urine volume, difficulty urinating, dyspareunia, dysuria, enuresis, flank pain, frequency, genital sores, hematuria, menstrual problem, pelvic pain, urgency, vaginal bleeding and vaginal discharge. Musculoskeletal: Negative for arthralgias, back pain, gait problem, joint swelling, myalgias, neck pain and neck stiffness. Skin: Negative for color change, pallor and rash. Allergic/Immunologic: Negative for environmental allergies, food allergies and immunocompromised state. Neurological: Negative for dizziness, tremors, seizures, syncope, facial asymmetry, speech difficulty, weakness, light-headedness, numbness and headaches. Hematological: Negative for adenopathy. Does not bruise/bleed easily.    Psychiatric/Behavioral: Negative for agitation, behavioral problems, confusion, decreased concentration, sleep disturbance and suicidal ideas. The patient is not nervous/anxious. Objective:   Physical Exam  Constitutional:       General: She is not in acute distress. Appearance: She is well-developed. HENT:      Head: Normocephalic and atraumatic. Right Ear: External ear normal.      Left Ear: External ear normal.      Nose: Nose normal.      Mouth/Throat:      Mouth: Mucous membranes are moist.      Pharynx: No oropharyngeal exudate. Eyes:      General: No scleral icterus. Right eye: No discharge. Left eye: No discharge. Conjunctiva/sclera: Conjunctivae normal.      Pupils: Pupils are equal, round, and reactive to light. Neck:      Thyroid: No thyromegaly. Vascular: No JVD. Trachea: No tracheal deviation. Cardiovascular:      Rate and Rhythm: Normal rate and regular rhythm. Pulses: Normal pulses. Heart sounds: Normal heart sounds. No murmur heard. No friction rub. No gallop. Pulmonary:      Effort: Pulmonary effort is normal. No respiratory distress. Breath sounds: Normal breath sounds. No stridor. No wheezing or rales. Chest:      Chest wall: No tenderness. Abdominal:      General: Bowel sounds are normal. There is no distension. Palpations: Abdomen is soft. There is no mass. Tenderness: There is no abdominal tenderness. There is no guarding or rebound. Musculoskeletal:         General: No tenderness. Normal range of motion. Cervical back: Normal range of motion and neck supple. Lymphadenopathy:      Cervical: No cervical adenopathy. Skin:     General: Skin is warm and dry. Coloration: Skin is not pale. Findings: No erythema or rash. Neurological:      General: No focal deficit present. Mental Status: She is alert and oriented to person, place, and time. Mental status is at baseline. Cranial Nerves: No cranial nerve deficit. Motor: No abnormal muscle tone.       Coordination: Coordination normal. Deep Tendon Reflexes: Reflexes are normal and symmetric. Reflexes normal.   Psychiatric:         Mood and Affect: Mood normal.         Behavior: Behavior normal.         Thought Content: Thought content normal.         Judgment: Judgment normal.          Vitals:    04/11/22 1639   BP: 114/71   Pulse: 61   Temp: 97.2 °F (36.2 °C)   SpO2: 99%         Assessment:      Diagnosis Orders   1. Osteoporosis without current pathological fracture, unspecified osteoporosis type     2. Acquired hypothyroidism     3. Essential hypertension     [facility]. [facility]     5. Gastroesophageal reflux disease, unspecified whether esophagitis present     6. Seasonal allergies     7. Osteopenia of multiple sites     8. Mild intermittent asthma without complication           Plan:     No orders of the defined types were placed in this encounter. Outpatient Encounter Medications as of 4/11/2022   Medication Sig Dispense Refill    bisoprolol (ZEBETA) 5 MG tablet TAKE 1 TABLET BY MOUTH ONE TIME A DAY 90 tablet 2    PARoxetine (PAXIL) 10 MG tablet TAKE 1 TABLET BY MOUTH ONE TIME A DAY 90 tablet 3    triamcinolone (KENALOG) 0.1 % cream Apply topically 2 times daily.  453.6 g 3    alendronate (FOSAMAX) 35 MG tablet Take 1 tablet by mouth every 7 days 12 tablet 5    fluticasone-salmeterol (ADVAIR HFA) 115-21 MCG/ACT inhaler Inhale 2 puffs into the lungs 2 times daily      Omega-3 1000 MG CAPS Take 900 mg by mouth      fexofenadine (ALLEGRA) 180 MG tablet Take 180 mg by mouth daily      levothyroxine (SYNTHROID) 112 MCG tablet Take 1 tablet by mouth daily 30 tablet 5    albuterol sulfate HFA (PROAIR HFA) 108 (90 BASE) MCG/ACT inhaler Inhale 2 puffs into the lungs every 6 hours as needed for Wheezing 3 Inhaler 3    [DISCONTINUED] estrogen, conjugated,-medroxyPROGESTERone (PREMPRO) 0.3-1.5 MG per tablet Take 1 tablet by mouth daily (Patient not taking: Reported on 4/11/2022) 28 tablet 5    [DISCONTINUED] metFORMIN (GLUCOPHAGE) 500 MG tablet Take 1 tablet by mouth 2 times daily (with meals) (Patient not taking: Reported on 4/11/2022) 60 tablet 5     No facility-administered encounter medications on file as of 4/11/2022.     20 minutes spent with patient counseling/educating on acute/chronic medical health problems, medications,  along with treatment options. Reviewed multiple labs/imaging/medications with patient during this time. Following Diet discussion/education was done on Weight Lose Diet. In addition Exercise plan and depression screen were discussed. Recent labs/imaging were discussed and reviewed with patient.

## 2022-04-12 ENCOUNTER — HOSPITAL ENCOUNTER (OUTPATIENT)
Age: 52
Setting detail: SPECIMEN
Discharge: HOME | End: 2022-04-12

## 2022-04-12 DIAGNOSIS — E66.09 CLASS 2 OBESITY DUE TO EXCESS CALORIES WITHOUT SERIOUS COMORBIDITY WITH BODY MASS INDEX (BMI) OF 36.0 TO 36.9 IN ADULT: ICD-10-CM

## 2022-04-12 DIAGNOSIS — E06.3 HYPOTHYROIDISM DUE TO HASHIMOTO'S THYROIDITIS: ICD-10-CM

## 2022-04-12 DIAGNOSIS — Z13.220 SCREENING, LIPID: ICD-10-CM

## 2022-04-12 DIAGNOSIS — E03.8 HYPOTHYROIDISM DUE TO HASHIMOTO'S THYROIDITIS: ICD-10-CM

## 2022-04-12 DIAGNOSIS — B34.9 ACUTE VIRAL SYNDROME: ICD-10-CM

## 2022-04-12 DIAGNOSIS — Z11.59 ENCOUNTER FOR HEPATITIS C SCREENING TEST FOR LOW RISK PATIENT: ICD-10-CM

## 2022-04-12 DIAGNOSIS — E55.9 VITAMIN D DEFICIENCY: ICD-10-CM

## 2022-04-12 DIAGNOSIS — R73.9 HYPERGLYCEMIA: ICD-10-CM

## 2022-04-12 LAB
ABSOLUTE EOS #: 0.27 K/UL (ref 0–0.44)
ABSOLUTE IMMATURE GRANULOCYTE: <0.03 K/UL (ref 0–0.3)
ABSOLUTE LYMPH #: 2.39 K/UL (ref 1.1–3.7)
ABSOLUTE MONO #: 0.43 K/UL (ref 0.1–1.2)
ALBUMIN SERPL-MCNC: 4.1 G/DL (ref 3.5–5.2)
ALBUMIN/GLOBULIN RATIO: 1.2 (ref 1–2.5)
ALP BLD-CCNC: 78 U/L (ref 35–104)
ALT SERPL-CCNC: 13 U/L (ref 5–33)
ANION GAP SERPL CALCULATED.3IONS-SCNC: 15 MMOL/L (ref 9–17)
AST SERPL-CCNC: 14 U/L
BASOPHILS # BLD: 1 % (ref 0–2)
BASOPHILS ABSOLUTE: 0.06 K/UL (ref 0–0.2)
BILIRUB SERPL-MCNC: 0.73 MG/DL (ref 0.3–1.2)
BUN BLDV-MCNC: 8 MG/DL (ref 6–20)
CALCIUM SERPL-MCNC: 9.4 MG/DL (ref 8.6–10.4)
CHLORIDE BLD-SCNC: 103 MMOL/L (ref 98–107)
CHOLESTEROL/HDL RATIO: 3.4
CHOLESTEROL: 177 MG/DL
CO2: 24 MMOL/L (ref 20–31)
CREAT SERPL-MCNC: 0.76 MG/DL (ref 0.5–0.9)
EOSINOPHILS RELATIVE PERCENT: 4 % (ref 1–4)
ESTIMATED AVERAGE GLUCOSE: 126 MG/DL
GFR AFRICAN AMERICAN: >60 ML/MIN
GFR NON-AFRICAN AMERICAN: >60 ML/MIN
GFR SERPL CREATININE-BSD FRML MDRD: NORMAL ML/MIN/{1.73_M2}
GLUCOSE BLD-MCNC: 93 MG/DL (ref 70–99)
HBA1C MFR BLD: 6 % (ref 4–6)
HCT VFR BLD CALC: 38.1 % (ref 36.3–47.1)
HDLC SERPL-MCNC: 52 MG/DL
HEMOGLOBIN: 12.6 G/DL (ref 11.9–15.1)
HEPATITIS C ANTIBODY: NONREACTIVE
IMMATURE GRANULOCYTES: 0 %
LDL CHOLESTEROL: 98 MG/DL (ref 0–130)
LYMPHOCYTES # BLD: 32 % (ref 24–43)
MCH RBC QN AUTO: 30.1 PG (ref 25.2–33.5)
MCHC RBC AUTO-ENTMCNC: 33.1 G/DL (ref 28.4–34.8)
MCV RBC AUTO: 91.1 FL (ref 82.6–102.9)
MONOCYTES # BLD: 6 % (ref 3–12)
NRBC AUTOMATED: 0 PER 100 WBC
PDW BLD-RTO: 12.2 % (ref 11.8–14.4)
PLATELET # BLD: 261 K/UL (ref 138–453)
PMV BLD AUTO: 10.4 FL (ref 8.1–13.5)
POTASSIUM SERPL-SCNC: 4.3 MMOL/L (ref 3.7–5.3)
RBC # BLD: 4.18 M/UL (ref 3.95–5.11)
SEG NEUTROPHILS: 57 % (ref 36–65)
SEGMENTED NEUTROPHILS ABSOLUTE COUNT: 4.33 K/UL (ref 1.5–8.1)
SODIUM BLD-SCNC: 142 MMOL/L (ref 135–144)
T3 FREE: 2.47 PG/ML (ref 2.02–4.43)
THYROXINE, FREE: 1.39 NG/DL (ref 0.93–1.7)
TOTAL PROTEIN: 7.4 G/DL (ref 6.4–8.3)
TRIGL SERPL-MCNC: 135 MG/DL
TSH SERPL DL<=0.05 MIU/L-ACNC: 1.69 UIU/ML (ref 0.3–5)
VITAMIN D 25-HYDROXY: 36.3 NG/ML
WBC # BLD: 7.5 K/UL (ref 3.5–11.3)

## 2022-04-14 LAB
INSULIN FREE: 14 UIU/ML (ref 3–25)
INSULIN: 20 UIU/ML (ref 3–25)

## 2022-04-18 ENCOUNTER — HOSPITAL ENCOUNTER (OUTPATIENT)
Dept: MAMMOGRAPHY | Facility: CLINIC | Age: 52
Discharge: HOME | End: 2022-04-20
Payer: COMMERCIAL

## 2022-04-18 DIAGNOSIS — M85.89 OSTEOPENIA OF MULTIPLE SITES: Primary | ICD-10-CM

## 2022-04-18 DIAGNOSIS — M81.0 OSTEOPOROSIS WITHOUT CURRENT PATHOLOGICAL FRACTURE, UNSPECIFIED OSTEOPOROSIS TYPE: ICD-10-CM

## 2022-04-18 PROCEDURE — 77080 DXA BONE DENSITY AXIAL: CPT

## 2022-04-18 RX ORDER — ACETAMINOPHEN 160 MG
2 TABLET,DISINTEGRATING ORAL DAILY
Qty: 180 CAPSULE | Refills: 1 | Status: SHIPPED | OUTPATIENT
Start: 2022-04-18

## 2022-04-18 RX ORDER — ALENDRONATE SODIUM 70 MG/1
70 TABLET ORAL
Qty: 12 TABLET | Refills: 1 | Status: SHIPPED | OUTPATIENT
Start: 2022-04-18 | End: 2022-06-09 | Stop reason: SDUPTHER

## 2022-04-18 RX ORDER — ANTACID TABLETS 648 MG/1
1 TABLET, CHEWABLE ORAL 2 TIMES DAILY
Qty: 180 TABLET | Refills: 5 | Status: SHIPPED | OUTPATIENT
Start: 2022-04-18 | End: 2023-04-18

## 2022-05-16 ENCOUNTER — HOSPITAL ENCOUNTER (OUTPATIENT)
Age: 52
Setting detail: SPECIMEN
Discharge: HOME | End: 2022-05-16

## 2022-05-16 DIAGNOSIS — Z11.52 ENCOUNTER FOR SCREENING FOR COVID-19: Primary | ICD-10-CM

## 2022-05-17 DIAGNOSIS — Z11.52 ENCOUNTER FOR SCREENING FOR COVID-19: ICD-10-CM

## 2022-05-17 LAB
SARS-COV-2: NORMAL
SARS-COV-2: NOT DETECTED
SOURCE: NORMAL

## 2022-06-09 ENCOUNTER — TELEPHONE (OUTPATIENT)
Dept: FAMILY MEDICINE CLINIC | Age: 52
End: 2022-06-09

## 2022-06-09 DIAGNOSIS — M85.89 OSTEOPENIA OF MULTIPLE SITES: ICD-10-CM

## 2022-06-09 DIAGNOSIS — I10 ESSENTIAL HYPERTENSION: ICD-10-CM

## 2022-06-09 DIAGNOSIS — F41.1 GAD (GENERALIZED ANXIETY DISORDER): ICD-10-CM

## 2022-06-09 DIAGNOSIS — E03.8 HYPOTHYROIDISM DUE TO HASHIMOTO'S THYROIDITIS: ICD-10-CM

## 2022-06-09 DIAGNOSIS — E66.9 CLASS 2 OBESITY WITH BODY MASS INDEX (BMI) OF 37.0 TO 37.9 IN ADULT, UNSPECIFIED OBESITY TYPE, UNSPECIFIED WHETHER SERIOUS COMORBIDITY PRESENT: ICD-10-CM

## 2022-06-09 DIAGNOSIS — E06.3 HYPOTHYROIDISM DUE TO HASHIMOTO'S THYROIDITIS: ICD-10-CM

## 2022-06-09 RX ORDER — BISOPROLOL FUMARATE 5 MG/1
TABLET ORAL
Qty: 90 TABLET | Refills: 1 | Status: SHIPPED | OUTPATIENT
Start: 2022-06-09 | End: 2022-06-10 | Stop reason: SDUPTHER

## 2022-06-09 RX ORDER — LEVOTHYROXINE SODIUM 112 UG/1
112 TABLET ORAL DAILY
Qty: 30 TABLET | Refills: 5 | Status: SHIPPED | OUTPATIENT
Start: 2022-06-09 | End: 2022-06-10 | Stop reason: SDUPTHER

## 2022-06-09 RX ORDER — ALENDRONATE SODIUM 70 MG/1
70 TABLET ORAL
Qty: 12 TABLET | Refills: 1 | Status: SHIPPED | OUTPATIENT
Start: 2022-06-09 | End: 2022-06-10 | Stop reason: SDUPTHER

## 2022-06-09 RX ORDER — ALBUTEROL SULFATE 90 UG/1
2 AEROSOL, METERED RESPIRATORY (INHALATION) EVERY 6 HOURS PRN
Qty: 1 EACH | Refills: 1 | Status: SHIPPED | OUTPATIENT
Start: 2022-06-09 | End: 2022-06-10 | Stop reason: SDUPTHER

## 2022-06-09 RX ORDER — PAROXETINE 10 MG/1
TABLET, FILM COATED ORAL
Qty: 90 TABLET | Refills: 1 | Status: SHIPPED | OUTPATIENT
Start: 2022-06-09 | End: 2022-06-10 | Stop reason: SDUPTHER

## 2022-06-09 RX ORDER — FEXOFENADINE HCL 180 MG/1
180 TABLET ORAL DAILY
Qty: 90 TABLET | Refills: 1 | Status: SHIPPED | OUTPATIENT
Start: 2022-06-09 | End: 2022-06-10 | Stop reason: SDUPTHER

## 2022-06-09 NOTE — TELEPHONE ENCOUNTER
Lara Keith is calling to request a refill on the following medication(s):    Last Visit Date (If Applicable):  3/71/8932    Next Visit Date:    7/11/2022    Medication Request:  Requested Prescriptions     Pending Prescriptions Disp Refills    albuterol sulfate HFA (PROAIR HFA) 108 (90 Base) MCG/ACT inhaler       Sig: Inhale 2 puffs into the lungs every 6 hours as needed for Wheezing    alendronate (FOSAMAX) 70 MG tablet 12 tablet 1     Sig: Take 1 tablet by mouth every 7 days    bisoprolol (ZEBETA) 5 MG tablet 90 tablet 2    levothyroxine (SYNTHROID) 112 MCG tablet 30 tablet 5     Sig: Take 1 tablet by mouth daily    PARoxetine (PAXIL) 10 MG tablet 90 tablet 3    Semaglutide,0.25 or 0.5MG/DOS, 2 MG/1.5ML SOPN 1 pen 0     Sig: Inject 1 Units into the skin Daily with lunch Lot number [id number] exp 04/2024 1 box given    fexofenadine (ALLEGRA) 180 MG tablet       Sig: Take 1 tablet by mouth daily    fluticasone-salmeterol (ADVAIR HFA) 115-21 MCG/ACT inhaler       Sig: Inhale 2 puffs into the lungs 2 times daily

## 2022-06-09 NOTE — TELEPHONE ENCOUNTER
Call from pt she gave the wrong new pharm info.   The correct pharm is now in her chart but so all of the meds ordered today need to be reordered 40

## 2022-06-10 DIAGNOSIS — I10 ESSENTIAL HYPERTENSION: ICD-10-CM

## 2022-06-10 DIAGNOSIS — F41.1 GAD (GENERALIZED ANXIETY DISORDER): ICD-10-CM

## 2022-06-10 DIAGNOSIS — E03.8 HYPOTHYROIDISM DUE TO HASHIMOTO'S THYROIDITIS: ICD-10-CM

## 2022-06-10 DIAGNOSIS — E66.9 CLASS 2 OBESITY WITH BODY MASS INDEX (BMI) OF 37.0 TO 37.9 IN ADULT, UNSPECIFIED OBESITY TYPE, UNSPECIFIED WHETHER SERIOUS COMORBIDITY PRESENT: ICD-10-CM

## 2022-06-10 DIAGNOSIS — M25.562 LEFT KNEE PAIN, UNSPECIFIED CHRONICITY: Primary | ICD-10-CM

## 2022-06-10 DIAGNOSIS — M85.89 OSTEOPENIA OF MULTIPLE SITES: ICD-10-CM

## 2022-06-10 DIAGNOSIS — E06.3 HYPOTHYROIDISM DUE TO HASHIMOTO'S THYROIDITIS: ICD-10-CM

## 2022-06-10 RX ORDER — ALENDRONATE SODIUM 70 MG/1
70 TABLET ORAL
Qty: 12 TABLET | Refills: 1 | Status: SHIPPED | OUTPATIENT
Start: 2022-06-10

## 2022-06-10 RX ORDER — FEXOFENADINE HCL 180 MG/1
180 TABLET ORAL DAILY
Qty: 90 TABLET | Refills: 1 | Status: SHIPPED | OUTPATIENT
Start: 2022-06-10

## 2022-06-10 RX ORDER — BISOPROLOL FUMARATE 5 MG/1
TABLET ORAL
Qty: 90 TABLET | Refills: 1 | Status: SHIPPED | OUTPATIENT
Start: 2022-06-10

## 2022-06-10 RX ORDER — LEVOTHYROXINE SODIUM 112 UG/1
112 TABLET ORAL DAILY
Qty: 30 TABLET | Refills: 5 | Status: SHIPPED | OUTPATIENT
Start: 2022-06-10

## 2022-06-10 RX ORDER — ALBUTEROL SULFATE 90 UG/1
2 AEROSOL, METERED RESPIRATORY (INHALATION) EVERY 6 HOURS PRN
Qty: 1 EACH | Refills: 1 | Status: SHIPPED | OUTPATIENT
Start: 2022-06-10

## 2022-06-10 RX ORDER — PAROXETINE 10 MG/1
TABLET, FILM COATED ORAL
Qty: 90 TABLET | Refills: 1 | Status: SHIPPED | OUTPATIENT
Start: 2022-06-10

## 2022-06-10 NOTE — PROGRESS NOTES
[address] AND SPORTS MEDICINE  [facility] Dominguez Self  [address] [id number]  Dept: [phone]  Dept Fax: [phone]          Right Knee - New Patient     Subjective:     Chief Complaint   Patient presents with    Knee Pain     Right Knee Pain     HPI:     Alm Ahumada, Dr. presents today for Right knee pain. Occupation: Physician. The pain has been present for 2 weeks. The patient recalls no specific injury. The patient has tried locking her knee, naprosyn, brace, ice with mild improvement. The pain is now described as Achy and Dull . There is pain on weight bearing. The knee has swelled. She states her entire right lower leg is swollen. There is not painful popping and clicking. The knee has not caught or locked up. The knee has not given out. It is stiff upon arising from sitting. It is  painful to go up and down stairs and sit for a prolonged time. The patient has not had a cortisone injection. The patient has not tried a lubrication injection. The patient has not tried physical therapy. The patient has not had surgery. ROS:   Review of Systems   Constitutional: Positive for activity change. Negative for appetite change, fatigue and fever. Respiratory: Negative. Negative for apnea, cough, chest tightness and shortness of breath. Cardiovascular: Negative. Negative for chest pain, palpitations and leg swelling. Gastrointestinal: Negative for abdominal distention, abdominal pain, constipation, diarrhea, nausea and vomiting. Genitourinary: Negative for difficulty urinating, dysuria and hematuria. Musculoskeletal: Positive for arthralgias, gait problem and joint swelling. Negative for myalgias. Skin: Negative for color change and rash. Neurological: Negative for dizziness, weakness, numbness and headaches. Psychiatric/Behavioral: Positive for sleep disturbance.        Past Medical History:    Past Medical History:   Diagnosis Date    Allergic rhinitis     Asthma     Closed fracture of right tibial plateau with routine healing 2020    Excessive bleeding     Pt denies    GERD (gastroesophageal reflux disease)     Halitosis     Hypertension     Hypothyroidism     Mild intermittent asthma without complication     Obesity 2020    SACHA (obstructive sleep apnea)     Osteoporosis     Other osteoporosis without current pathological fracture 2017    Screen for colon cancer     Urinary retention     Uterine fibroid        Past Surgical History:    Past Surgical History:   Procedure Laterality Date     SECTION      PARATHYROID GLAND SURGERY Right 3/27/2020    NECK EXPLORATION AND REMOVAL OF PARATHYROID ADENOMA performed by Ras Bui MD at [address] [place] [address]  2018    UPPER GASTROINTESTINAL ENDOSCOPY N/A 2019    EGD BIOPSY performed by Salo Wolff MD at [facility]       CurrentMedications:   Current Outpatient Medications   Medication Sig Dispense Refill    liraglutide-weight management 18 MG/3ML SOPN Start 0.6 mg weekly x 1 week, if tolerating increase to 1.2 mg weekly and then 1.8 mg weekly 2 pen 1    albuterol sulfate HFA (PROAIR HFA) 108 (90 Base) MCG/ACT inhaler Inhale 2 puffs into the lungs every 6 hours as needed for Wheezing 1 each 1    alendronate (FOSAMAX) 70 MG tablet Take 1 tablet by mouth every 7 days 12 tablet 1    bisoprolol (ZEBETA) 5 MG tablet TAKE 1 TABLET BY MOUTH ONE TIME A DAY 90 tablet 1    fexofenadine (ALLEGRA) 180 MG tablet Take 1 tablet by mouth daily 90 tablet 1    fluticasone-salmeterol (ADVAIR HFA) 115-21 MCG/ACT inhaler Inhale 2 puffs into the lungs 2 times daily 3 each 1    levothyroxine (SYNTHROID) 112 MCG tablet Take 1 tablet by mouth daily 30 tablet 5    PARoxetine (PAXIL) 10 MG tablet TAKE 1 TABLET BY MOUTH ONE TIME A DAY 90 tablet 1    Semaglutide,0.25 or 0.5MG/DOS, 2 MG/1.5ML SOPN Inject 1 Units into the skin Daily with lunch Lot number [id number] exp 04/2024 1 box given 1 pen 0    Cholecalciferol (VITAMIN D3) 50 MCG (2000 UT) CAPS Take 2 capsules by mouth daily 180 capsule 1    calcium carbonate 648 MG TABS Take 1 tablet by mouth 2 times daily 180 tablet 5    Omega-3 1000 MG CAPS Take 900 mg by mouth       No current facility-administered medications for this visit. Allergies:    Losartan    Social History:   Social History     Socioeconomic History    Marital status:      Spouse name: None    Number of children: None    Years of education: None    Highest education level: None   Occupational History    None   Tobacco Use    Smoking status: Never Smoker    Smokeless tobacco: Never Used   Vaping Use    Vaping Use: Never used   Substance and Sexual Activity    Alcohol use: Yes     Alcohol/week: 0.0 standard drinks     Comment: social    Drug use: No    Sexual activity: Not Currently   Other Topics Concern    None   Social History Narrative    None     Social Determinants of Health     Financial Resource Strain: Low Risk     Difficulty of Paying Living Expenses: Not hard at all   Food Insecurity: No Food Insecurity    Worried About Running Out of Food in the Last Year: Never true    Antonette of Food in the Last Year: Never true   Transportation Needs:     Lack of Transportation (Medical): Not on file    Lack of Transportation (Non-Medical):  Not on file   Physical Activity:     Days of Exercise per Week: Not on file    Minutes of Exercise per Session: Not on file   Stress:     Feeling of Stress : Not on file   Social Connections:     Frequency of Communication with Friends and Family: Not on file    Frequency of Social Gatherings with Friends and Family: Not on file    Attends Oriental orthodox Services: Not on file    Active Member of Clubs or Organizations: Not on file    Attends Club or Organization Meetings: Not on file    Marital Status: Not on file   Intimate Partner Violence:     Fear of Current or Ex-Partner: Not on file    Emotionally Abused: Not on file    Physically Abused: Not on file    Sexually Abused: Not on file   Housing Stability:     Unable to Pay for Housing in the Last Year: Not on file    Number of Places Lived in the Last Year: Not on file    Unstable Housing in the Last Year: Not on file       Family History:  Family History   Problem Relation Age of Onset    Heart Disease Mother 48    High Blood Pressure Mother 48    Diabetes Father 61    Heart Disease Father 48    High Blood Pressure Father 48    Diabetes Paternal Uncle     Breast Cancer Maternal Grandmother 79        breast     Diabetes Paternal Grandmother     Stroke Neg Hx     Colon Cancer Neg Hx     Uterine Cancer Neg Hx     Ovarian Cancer Neg Hx        Vitals:   /80   Pulse 60   Resp 12   Ht 5' 3\" (1.6 m)   Wt 210 lb (95.3 kg)   LMP 03/01/2020   BMI 37.20 kg/m²  Body mass index is 37.2 kg/m². Physical Examination:     Orthopedics:    GENERAL: Alert and oriented X3 in no acute distress. SKIN: Intact without lesions or ulcerations. NEURO: Intact to sensory and motor testing. VASC: Capillary refill is less than 3 seconds. KNEE EXAM    LOCATION: Right Knee  GEN: Alert and oriented X 3, in no acute distress. GAIT: The patient's gait was observed while entering the exam room and was noted to be antalgic. The extremity is in anatomic alignment. SKIN: Intact without rashes, lesions, or ulcerations. No obvious deformity or swelling. NEURO: The patient responds to light touch throughout bilateral LE. Patellar and Achilles reflexes are 2/4. VASC: The bilateral LE is neurovascularly intact with 2/4 DP and 2/4 PT pulses. Brisk capillary refill. ROM: 0/110 degrees. There is mild effusion. MUSC: decreased quad tone  LIGAMENT: Lachman's test is Negative with Good endpoint. Anterior drawer Negative. Posterior drawer Negative.  There is No varus instability at 0 degrees and No varus instability at 30 degrees. There is No valgus instability at 0 degrees and No valgus instability at 30 degrees. SPECIAL: Shelley test is positive with no clunks, no crepitation, and + pain. PALP: There is medial joint line pain. Pes anserine bursa pain. Mild medial tibial pain to palpation. Posterior proximal calf pain to palpation  Assessment:     1. Primary osteoarthritis of right knee    2. Pes anserine bursitis    3. Right medial tibial stress syndrome, initial encounter      Procedures:    Procedure: yes    Regular Knee Injection    Location: Right Knee  Procedure: I discussed in detail the risks, benefits and complications of the corticosteroid injection which included but are not limited to: infection, skin reactions, hot swollen, and anaphylaxis with the patient. Nella Phillips Dr. verbalized understanding and they have agreed to have the corticosteroid injection into the right knee. The patient was placed in the Supine position on the exam table. The superior lateral portal was identified and marked with a ball point pen. The skin was prepped with betadine in a sterile fashion. Utilizing a [employer] ultrasound unit with a variable frequency linear transducer and clean technique with sterile gloves, a 3 cc solution containing 2 cc of 1% lidocaine   with 1 cc containing 80 mg of Depo-medrol  was injected. There was no resistance to the injection. The wound was cleansed and a band-aid was placed. the patient tolerated the procedure without difficulty. Adverse reactions to the injection were discussed with the patient including signs of infection (increasing pain, redness, swelling) and the patient was instructed to call immediately if experiencing any of these symptoms. Images of the injection site were recorded throughout the procedure and are saved on the SD card which is stored in the GE ultrasound unit. All images were downloaded and stored in patient's chart.     Radiology:   KNEE X-RAY    4 views of the right knee including AP, bilateral tunnel, and lateral in the upright position, and skyline views reveal anatomic alignment with no fracture or dislocation. Kellgren grade II changes of osteoarthritis (joint space narrowing, osteophyte, subchondral sclerosis, bony deformity/cyst) of the tricompartment(s). No osseous loose bodies. No bony erosion or periosteal reaction. No soft tissue masses. Impression: Mild arthritic changes of the right knee. Plan:   Treatment : I reviewed the x-ray with the patient and I informed them that the mild arthritic changes of the right knee. We discussed the etiologies and natural histories of mild arthritic changes of the right knee, calf pain, Pes anserine bursitis and right tibia shin splints. We discussed the various treatment alternatives including anti-inflammatory medications, physical therapy, injections, further imaging studies and as a last result surgery. During today's visit, discussed that she did so much walking when she was in [address] [address].,  [address] then vacation. She states she was walking 8 to 9 miles a day. I do believe she has developed a shin splint on the right tibia and some aggravation of some arthritis in her right knee either from her walking or her change in gait due to her knee pain. She also has pain in her right calf and I do feel that we should get a stat Doppler just because she is done so much flying in the last several weeks. My suggestion would be compression socks on her right lower extremity. I would also like to do a cortisone injection to the knees to see how well that makes her feel. It is not something we would want to do over and over again but a one-time shot and then going to physical therapy in 1 week. She should also wear tennis shoes or very good supportive shoes until this pain gets better. She can also use a cane in the opposite hand to decrease the stress on her knee and her shin splint.   The patient has opted for a cortisone injection into the right knee to help reduce inflammation and pain. The injection site should never get red, hot, or swollen and if it does the patient will contact our office right away. The patient may experience a increase in soreness the first 24-48 hours due to a cortisone flair and can take anti-inflammatories for a short period of time to reduce that soreness. The patient should not submerge the injection site in water for a minimum of 24 hours to avoid infection. This means no lakes, pools, ponds, or hot tubs for 24 hours. If the patient is diabetic the injection may increase their blood sugar for up to one week. The patient can do this cortisone injection once every 3 months as needed. If the injections stop working and do not give the patient relief the patient should consider surgical interventions to produce long term relief. A physical therapy prescription was given. Patient should return to the clinic in 8 weeks to follow up with  Mike Velasquez PA-C. If she continues to have increase in pain, we will bring her back and do a tibia/fibula x-ray can consider putting her in a walking boot. The patient will call the office immediately with any problems.       Orders Placed This Encounter   Medications    methylPREDNISolone acetate (DEPO-MEDROL) injection 80 mg    lidocaine 1 % injection 2 mL       Orders Placed This Encounter   Procedures    US DUP LOWER EXTREMITY RIGHT SAÚL     Standing Status:   Future     Number of Occurrences:   1     Standing Expiration Date:   6/13/2023     Order Specific Question:   Reason for exam:     Answer:   r/o DVT    [facility]     Referral Priority:   Routine     Referral Type:   Eval and Treat     Referral Reason:   Specialty Services Required     Requested Specialty:   Physical Therapist     Number of Visits Requested:   1         This note is created with the assistance of a speech recognition program.  While intending to generate a document that actually reflects the content of the visit, the document can still have some errors including those of syntax and sound a like substitutions which may escape proof reading.   In such instances, actual meaning can be extrapolated by contextual diversion    Electronically signed by Yenny Rodriguez PA-C, on 6/13/2022 at 6:47 PM

## 2022-06-13 ENCOUNTER — OFFICE VISIT (OUTPATIENT)
Dept: ORTHOPEDIC SURGERY | Age: 52
End: 2022-06-13
Payer: COMMERCIAL

## 2022-06-13 ENCOUNTER — HOSPITAL ENCOUNTER (OUTPATIENT)
Dept: ULTRASOUND IMAGING | Facility: CLINIC | Age: 52
Discharge: HOME | End: 2022-06-15
Payer: COMMERCIAL

## 2022-06-13 ENCOUNTER — HOSPITAL ENCOUNTER (OUTPATIENT)
Facility: CLINIC | Age: 52
Discharge: HOME | End: 2022-06-15
Payer: COMMERCIAL

## 2022-06-13 VITALS
BODY MASS INDEX: 37.21 KG/M2 | WEIGHT: 210 LBS | DIASTOLIC BLOOD PRESSURE: 80 MMHG | SYSTOLIC BLOOD PRESSURE: 110 MMHG | RESPIRATION RATE: 12 BRPM | HEIGHT: 63 IN | HEART RATE: 60 BPM

## 2022-06-13 DIAGNOSIS — E66.9 CLASS 2 OBESITY WITH BODY MASS INDEX (BMI) OF 37.0 TO 37.9 IN ADULT, UNSPECIFIED OBESITY TYPE, UNSPECIFIED WHETHER SERIOUS COMORBIDITY PRESENT: Primary | ICD-10-CM

## 2022-06-13 DIAGNOSIS — S86.891A RIGHT MEDIAL TIBIAL STRESS SYNDROME, INITIAL ENCOUNTER: ICD-10-CM

## 2022-06-13 DIAGNOSIS — M25.561 RIGHT KNEE PAIN, UNSPECIFIED CHRONICITY: Primary | ICD-10-CM

## 2022-06-13 DIAGNOSIS — M70.50 PES ANSERINE BURSITIS: ICD-10-CM

## 2022-06-13 DIAGNOSIS — M17.11 PRIMARY OSTEOARTHRITIS OF RIGHT KNEE: Primary | ICD-10-CM

## 2022-06-13 PROCEDURE — 20611 DRAIN/INJ JOINT/BURSA W/US: CPT | Performed by: PHYSICIAN ASSISTANT

## 2022-06-13 PROCEDURE — 99204 OFFICE O/P NEW MOD 45 MIN: CPT | Performed by: PHYSICIAN ASSISTANT

## 2022-06-13 PROCEDURE — 93971 EXTREMITY STUDY: CPT

## 2022-06-13 RX ORDER — METHYLPREDNISOLONE ACETATE 80 MG/ML
80 INJECTION, SUSPENSION INTRA-ARTICULAR; INTRALESIONAL; INTRAMUSCULAR; SOFT TISSUE ONCE
Status: COMPLETED | OUTPATIENT
Start: 2022-06-13 | End: 2022-06-13

## 2022-06-13 RX ORDER — LIDOCAINE HYDROCHLORIDE 10 MG/ML
2 INJECTION, SOLUTION INFILTRATION; PERINEURAL ONCE
Status: COMPLETED | OUTPATIENT
Start: 2022-06-13 | End: 2022-06-13

## 2022-06-13 RX ADMIN — METHYLPREDNISOLONE ACETATE 80 MG: 80 INJECTION, SUSPENSION INTRA-ARTICULAR; INTRALESIONAL; INTRAMUSCULAR; SOFT TISSUE at 11:52

## 2022-06-13 RX ADMIN — LIDOCAINE HYDROCHLORIDE 2 ML: 10 INJECTION, SOLUTION INFILTRATION; PERINEURAL at 11:52

## 2022-06-13 ASSESSMENT — ENCOUNTER SYMPTOMS
ABDOMINAL DISTENTION: 0
COLOR CHANGE: 0
CONSTIPATION: 0
VOMITING: 0
NAUSEA: 0
SHORTNESS OF BREATH: 0
APNEA: 0
ABDOMINAL PAIN: 0
DIARRHEA: 0
RESPIRATORY NEGATIVE: 1
COUGH: 0
CHEST TIGHTNESS: 0

## 2022-07-11 ENCOUNTER — OFFICE VISIT (OUTPATIENT)
Dept: FAMILY MEDICINE CLINIC | Age: 52
End: 2022-07-11
Payer: COMMERCIAL

## 2022-07-11 VITALS
HEIGHT: 63 IN | TEMPERATURE: 97.3 F | WEIGHT: 206 LBS | HEART RATE: 63 BPM | SYSTOLIC BLOOD PRESSURE: 101 MMHG | OXYGEN SATURATION: 99 % | DIASTOLIC BLOOD PRESSURE: 70 MMHG | BODY MASS INDEX: 36.5 KG/M2

## 2022-07-11 DIAGNOSIS — E03.8 HYPOTHYROIDISM DUE TO HASHIMOTO'S THYROIDITIS: ICD-10-CM

## 2022-07-11 DIAGNOSIS — E66.9 CLASS 2 OBESITY WITH BODY MASS INDEX (BMI) OF 36.0 TO 36.9 IN ADULT, UNSPECIFIED OBESITY TYPE, UNSPECIFIED WHETHER SERIOUS COMORBIDITY PRESENT: Primary | ICD-10-CM

## 2022-07-11 DIAGNOSIS — E06.3 HYPOTHYROIDISM DUE TO HASHIMOTO'S THYROIDITIS: ICD-10-CM

## 2022-07-11 DIAGNOSIS — N95.1 MENOPAUSAL STATE: ICD-10-CM

## 2022-07-11 DIAGNOSIS — E03.9 ACQUIRED HYPOTHYROIDISM: ICD-10-CM

## 2022-07-11 DIAGNOSIS — M85.89 OSTEOPENIA OF MULTIPLE SITES: ICD-10-CM

## 2022-07-11 DIAGNOSIS — K21.9 GASTROESOPHAGEAL REFLUX DISEASE, UNSPECIFIED WHETHER ESOPHAGITIS PRESENT: ICD-10-CM

## 2022-07-11 PROBLEM — Z98.891 HISTORY OF CESAREAN SECTION: Status: RESOLVED | Noted: 2019-07-08 | Resolved: 2022-07-11

## 2022-07-11 PROBLEM — B34.9 ACUTE VIRAL SYNDROME: Status: RESOLVED | Noted: 2022-01-07 | Resolved: 2022-07-11

## 2022-07-11 PROCEDURE — 99213 OFFICE O/P EST LOW 20 MIN: CPT | Performed by: FAMILY MEDICINE

## 2022-07-11 RX ORDER — SEMAGLUTIDE 1.34 MG/ML
0.5 INJECTION, SOLUTION SUBCUTANEOUS WEEKLY
Qty: 4 PEN | Refills: 0 | Status: SHIPPED | OUTPATIENT
Start: 2022-07-11

## 2022-07-11 ASSESSMENT — ENCOUNTER SYMPTOMS
EYE DISCHARGE: 0
BACK PAIN: 0
BLOOD IN STOOL: 0
VOMITING: 0
ABDOMINAL DISTENTION: 0
SINUS PRESSURE: 0
RECTAL PAIN: 0
ANAL BLEEDING: 0
SHORTNESS OF BREATH: 0
COUGH: 0
CHEST TIGHTNESS: 0
COLOR CHANGE: 0
DIARRHEA: 0
ABDOMINAL PAIN: 0
CONSTIPATION: 0
NAUSEA: 0
TROUBLE SWALLOWING: 0
EYE PAIN: 0
EYE REDNESS: 0
VOICE CHANGE: 0

## 2022-07-11 ASSESSMENT — PATIENT HEALTH QUESTIONNAIRE - PHQ9
2. FEELING DOWN, DEPRESSED OR HOPELESS: 0
SUM OF ALL RESPONSES TO PHQ QUESTIONS 1-9: 0
1. LITTLE INTEREST OR PLEASURE IN DOING THINGS: 0
SUM OF ALL RESPONSES TO PHQ9 QUESTIONS 1 & 2: 0
SUM OF ALL RESPONSES TO PHQ9 QUESTIONS 1 & 2: 0
SUM OF ALL RESPONSES TO PHQ QUESTIONS 1-9: 0
1. LITTLE INTEREST OR PLEASURE IN DOING THINGS: 0
SUM OF ALL RESPONSES TO PHQ QUESTIONS 1-9: 0
2. FEELING DOWN, DEPRESSED OR HOPELESS: 0
SUM OF ALL RESPONSES TO PHQ QUESTIONS 1-9: 0

## 2022-07-11 ASSESSMENT — ANXIETY QUESTIONNAIRES
7. FEELING AFRAID AS IF SOMETHING AWFUL MIGHT HAPPEN: 0
5. BEING SO RESTLESS THAT IT IS HARD TO SIT STILL: 0
2. NOT BEING ABLE TO STOP OR CONTROL WORRYING: 0
GAD7 TOTAL SCORE: 0
6. BECOMING EASILY ANNOYED OR IRRITABLE: 0
4. TROUBLE RELAXING: 0
3. WORRYING TOO MUCH ABOUT DIFFERENT THINGS: 0
1. FEELING NERVOUS, ANXIOUS, OR ON EDGE: 0
IF YOU CHECKED OFF ANY PROBLEMS ON THIS QUESTIONNAIRE, HOW DIFFICULT HAVE THESE PROBLEMS MADE IT FOR YOU TO DO YOUR WORK, TAKE CARE OF THINGS AT HOME, OR GET ALONG WITH OTHER PEOPLE: NOT DIFFICULT AT ALL

## 2022-07-11 NOTE — PROGRESS NOTES
seizures, syncope, facial asymmetry, speech difficulty, weakness, light-headedness, numbness and headaches. Hematological: Negative for adenopathy. Does not bruise/bleed easily. Psychiatric/Behavioral: Negative for agitation, behavioral problems, confusion, decreased concentration, sleep disturbance and suicidal ideas. The patient is not nervous/anxious. Objective:   Physical Exam  Constitutional:       General: She is not in acute distress. HENT:      Head: Normocephalic and atraumatic. Right Ear: External ear normal.      Left Ear: External ear normal.      Nose: Nose normal.      Mouth/Throat:      Mouth: Mucous membranes are moist.   Eyes:      Conjunctiva/sclera: Conjunctivae normal.      Pupils: Pupils are equal, round, and reactive to light. Neck:      Thyroid: No thyromegaly. Trachea: No tracheal deviation. Cardiovascular:      Rate and Rhythm: Normal rate and regular rhythm. Pulses: Normal pulses. Heart sounds: Normal heart sounds. No murmur heard. No friction rub. No gallop. Pulmonary:      Effort: Pulmonary effort is normal. No respiratory distress. Breath sounds: Normal breath sounds. No stridor. No wheezing or rales. Chest:      Chest wall: No tenderness. Abdominal:      General: Bowel sounds are normal. There is no distension. Palpations: Abdomen is soft. Tenderness: There is no abdominal tenderness. There is no rebound. Musculoskeletal:         General: Normal range of motion. Cervical back: Normal range of motion. Lymphadenopathy:      Cervical: No cervical adenopathy. Skin:     General: Skin is warm. Coloration: Skin is not pale. Findings: No erythema or rash. Neurological:      General: No focal deficit present. Mental Status: She is alert and oriented to person, place, and time. Mental status is at baseline. Cranial Nerves: No cranial nerve deficit. Motor: No abnormal muscle tone.       Deep Tendon Reflexes: Reflexes normal.   Psychiatric:         Mood and Affect: Mood normal.         Behavior: Behavior normal.         Thought Content: Thought content normal.         Judgment: Judgment normal.          Vitals:    07/11/22 1623   BP: 101/70   Pulse: 63   Temp: 97.3 °F (36.3 °C)   SpO2: 99%         Assessment:      Diagnosis Orders   1. Class 2 obesity with body mass index (BMI) of 36.0 to 36.9 in adult, unspecified obesity type, unspecified whether serious comorbidity present  eflow Management Syrinix or 0.5MG/DOS, (OZEMPIC, 0.25 OR 0.5 MG/DOSE,) 2 MG/1.5ML SOPN   2. Hypothyroidism due to Hashimoto's thyroiditis     3. Acquired hypothyroidism     4. Menopausal state     5. Gastroesophageal reflux disease, unspecified whether esophagitis present     6.  Osteopenia of multiple sites           Plan:     Orders Placed This Encounter   Procedures   [address] [facility]       Outpatient Encounter Medications as of 7/11/2022   Medication Sig Dispense Refill    Semaglutide,0.25 or 0.5MG/DOS, (OZEMPIC, 0.25 OR 0.5 MG/DOSE,) 2 MG/1.5ML SOPN Inject 0.5 mg into the skin once a week 4 pen 0    albuterol sulfate HFA (PROAIR HFA) 108 (90 Base) MCG/ACT inhaler Inhale 2 puffs into the lungs every 6 hours as needed for Wheezing 1 each 1    alendronate (FOSAMAX) 70 MG tablet Take 1 tablet by mouth every 7 days 12 tablet 1    bisoprolol (ZEBETA) 5 MG tablet TAKE 1 TABLET BY MOUTH ONE TIME A DAY 90 tablet 1    fexofenadine (ALLEGRA) 180 MG tablet Take 1 tablet by mouth daily 90 tablet 1    fluticasone-salmeterol (ADVAIR HFA) 115-21 MCG/ACT inhaler Inhale 2 puffs into the lungs 2 times daily 3 each 1    levothyroxine (SYNTHROID) 112 MCG tablet Take 1 tablet by mouth daily 30 tablet 5    PARoxetine (PAXIL) 10 MG tablet TAKE 1 TABLET BY MOUTH ONE TIME A DAY 90 tablet 1    Cholecalciferol (VITAMIN D3) 50 MCG (2000 UT) CAPS Take 2 capsules by mouth daily 180 capsule 1  calcium carbonate 648 MG TABS Take 1 tablet by mouth 2 times daily 180 tablet 5    Omega-3 1000 MG CAPS Take 900 mg by mouth      [DISCONTINUED] liraglutide-weight management 18 MG/3ML SOPN Start 0.6 mg weekly x 1 week, if tolerating increase to 1.2 mg weekly and then 1.8 mg weekly 2 pen 1    [DISCONTINUED] Semaglutide,0.25 or 0.5MG/DOS, 2 MG/1.5ML SOPN Inject 1 Units into the skin Daily with lunch Lot number [id number] exp 04/2024 1 box given 1 pen 0     No facility-administered encounter medications on file as of 7/11/2022.     15 minutes spent with patient counseling/educating on acute/chronic medical health problems, medications,  along with treatment options. Reviewed multiple labs/imaging/medications with patient during this time. Following Diet discussion/education was done on Dash Diet. In addition Exercise plan and depression screen were discussed. Recent labs/imaging were discussed and reviewed with patient.

## 2023-01-06 DIAGNOSIS — M81.0 OSTEOPOROSIS WITHOUT CURRENT PATHOLOGICAL FRACTURE, UNSPECIFIED OSTEOPOROSIS TYPE: Primary | ICD-10-CM

## 2023-01-06 DIAGNOSIS — I10 ESSENTIAL HYPERTENSION: ICD-10-CM

## 2023-01-06 RX ORDER — ALENDRONATE SODIUM 70 MG/1
70 TABLET ORAL
Qty: 12 TABLET | Refills: 3 | Status: SHIPPED | OUTPATIENT
Start: 2023-01-06

## 2023-01-06 RX ORDER — BISOPROLOL FUMARATE 5 MG/1
5 TABLET, FILM COATED ORAL DAILY
Qty: 90 TABLET | Refills: 3 | Status: SHIPPED | OUTPATIENT
Start: 2023-01-06

## 2023-03-09 ENCOUNTER — OFFICE VISIT (OUTPATIENT)
Dept: FAMILY MEDICINE CLINIC | Age: 53
End: 2023-03-09

## 2023-03-09 VITALS
HEART RATE: 51 BPM | SYSTOLIC BLOOD PRESSURE: 116 MMHG | BODY MASS INDEX: 33.46 KG/M2 | HEIGHT: 64 IN | DIASTOLIC BLOOD PRESSURE: 68 MMHG | OXYGEN SATURATION: 97 % | TEMPERATURE: 97.2 F | WEIGHT: 196 LBS

## 2023-03-09 DIAGNOSIS — N95.1 MENOPAUSAL STATE: ICD-10-CM

## 2023-03-09 DIAGNOSIS — Z12.11 SCREEN FOR COLON CANCER: Primary | ICD-10-CM

## 2023-03-09 DIAGNOSIS — E03.8 HYPOTHYROIDISM DUE TO HASHIMOTO'S THYROIDITIS: ICD-10-CM

## 2023-03-09 DIAGNOSIS — E89.2 S/P PARATHYROIDECTOMY (HCC): ICD-10-CM

## 2023-03-09 DIAGNOSIS — E55.9 VITAMIN D DEFICIENCY: ICD-10-CM

## 2023-03-09 DIAGNOSIS — E06.3 HYPOTHYROIDISM DUE TO HASHIMOTO'S THYROIDITIS: ICD-10-CM

## 2023-03-09 DIAGNOSIS — R73.9 HYPERGLYCEMIA: ICD-10-CM

## 2023-03-09 DIAGNOSIS — E66.09 CLASS 1 OBESITY DUE TO EXCESS CALORIES WITHOUT SERIOUS COMORBIDITY WITH BODY MASS INDEX (BMI) OF 34.0 TO 34.9 IN ADULT: ICD-10-CM

## 2023-03-09 DIAGNOSIS — E03.9 ACQUIRED HYPOTHYROIDISM: ICD-10-CM

## 2023-03-09 DIAGNOSIS — M85.89 OSTEOPENIA OF MULTIPLE SITES: ICD-10-CM

## 2023-03-09 DIAGNOSIS — I10 ESSENTIAL HYPERTENSION: ICD-10-CM

## 2023-03-09 DIAGNOSIS — J30.2 SEASONAL ALLERGIES: ICD-10-CM

## 2023-03-09 DIAGNOSIS — Z13.220 SCREENING, LIPID: ICD-10-CM

## 2023-03-09 PROBLEM — M70.51 PES ANSERINUS BURSITIS OF RIGHT KNEE: Status: RESOLVED | Noted: 2020-08-17 | Resolved: 2023-03-09

## 2023-03-09 PROBLEM — N95.9 PERIMENOPAUSAL DISORDER: Status: ACTIVE | Noted: 2020-08-17

## 2023-03-09 PROBLEM — E66.811 CLASS 1 OBESITY IN ADULT: Status: ACTIVE | Noted: 2020-09-16

## 2023-03-09 RX ORDER — PAROXETINE 10 MG/1
10 TABLET, FILM COATED ORAL DAILY
Qty: 90 TABLET | Refills: 3 | Status: SHIPPED | OUTPATIENT
Start: 2023-03-09

## 2023-03-09 SDOH — ECONOMIC STABILITY: HOUSING INSECURITY
IN THE LAST 12 MONTHS, WAS THERE A TIME WHEN YOU DID NOT HAVE A STEADY PLACE TO SLEEP OR SLEPT IN A SHELTER (INCLUDING NOW)?: NO

## 2023-03-09 SDOH — ECONOMIC STABILITY: FOOD INSECURITY: WITHIN THE PAST 12 MONTHS, YOU WORRIED THAT YOUR FOOD WOULD RUN OUT BEFORE YOU GOT MONEY TO BUY MORE.: NEVER TRUE

## 2023-03-09 SDOH — ECONOMIC STABILITY: FOOD INSECURITY: WITHIN THE PAST 12 MONTHS, THE FOOD YOU BOUGHT JUST DIDN'T LAST AND YOU DIDN'T HAVE MONEY TO GET MORE.: NEVER TRUE

## 2023-03-09 SDOH — ECONOMIC STABILITY: INCOME INSECURITY: HOW HARD IS IT FOR YOU TO PAY FOR THE VERY BASICS LIKE FOOD, HOUSING, MEDICAL CARE, AND HEATING?: NOT HARD AT ALL

## 2023-03-09 ASSESSMENT — ANXIETY QUESTIONNAIRES
1. FEELING NERVOUS, ANXIOUS, OR ON EDGE: 0
IF YOU CHECKED OFF ANY PROBLEMS ON THIS QUESTIONNAIRE, HOW DIFFICULT HAVE THESE PROBLEMS MADE IT FOR YOU TO DO YOUR WORK, TAKE CARE OF THINGS AT HOME, OR GET ALONG WITH OTHER PEOPLE: NOT DIFFICULT AT ALL
GAD7 TOTAL SCORE: 0
5. BEING SO RESTLESS THAT IT IS HARD TO SIT STILL: 0
3. WORRYING TOO MUCH ABOUT DIFFERENT THINGS: 0
4. TROUBLE RELAXING: 0
2. NOT BEING ABLE TO STOP OR CONTROL WORRYING: 0
6. BECOMING EASILY ANNOYED OR IRRITABLE: 0
7. FEELING AFRAID AS IF SOMETHING AWFUL MIGHT HAPPEN: 0

## 2023-03-09 ASSESSMENT — PATIENT HEALTH QUESTIONNAIRE - PHQ9
SUM OF ALL RESPONSES TO PHQ QUESTIONS 1-9: 0
SUM OF ALL RESPONSES TO PHQ9 QUESTIONS 1 & 2: 0
SUM OF ALL RESPONSES TO PHQ QUESTIONS 1-9: 0
SUM OF ALL RESPONSES TO PHQ QUESTIONS 1-9: 0
SUM OF ALL RESPONSES TO PHQ9 QUESTIONS 1 & 2: 0
SUM OF ALL RESPONSES TO PHQ QUESTIONS 1-9: 0
1. LITTLE INTEREST OR PLEASURE IN DOING THINGS: 0
1. LITTLE INTEREST OR PLEASURE IN DOING THINGS: 0
SUM OF ALL RESPONSES TO PHQ QUESTIONS 1-9: 0
2. FEELING DOWN, DEPRESSED OR HOPELESS: 0
2. FEELING DOWN, DEPRESSED OR HOPELESS: 0

## 2023-03-09 ASSESSMENT — ENCOUNTER SYMPTOMS
COUGH: 0
NAUSEA: 0
BACK PAIN: 0
CONSTIPATION: 0
VOMITING: 0
SHORTNESS OF BREATH: 0
EYE REDNESS: 0
SORE THROAT: 0
ABDOMINAL PAIN: 0
EYE DISCHARGE: 0
BLOOD IN STOOL: 0
STRIDOR: 0
PHOTOPHOBIA: 0
DIARRHEA: 0
EYE PAIN: 0

## 2023-03-09 NOTE — PROGRESS NOTES
Subjective:      Patient ID: Romayne Sieve, Dr. is a 45 y.o. female. States started a weight loss plan a month ago. Georgette diet plan . BP here for a Annual Physical.  Needs labs as well. Mood sleep appetite ok. State snot much exercise   States after new diet was advised not to exercise for 4 weeks- then start slow. States drinking a lot of water. Review of Systems   Constitutional:  Negative for chills and fever. HENT:  Negative for congestion, ear pain, hearing loss, nosebleeds, sore throat and tinnitus. Eyes:  Negative for photophobia, pain, discharge and redness. Respiratory:  Negative for cough, shortness of breath and stridor. Cardiovascular:  Negative for chest pain, palpitations and leg swelling. Gastrointestinal:  Negative for abdominal pain, blood in stool, constipation, diarrhea, nausea and vomiting. Endocrine: Negative for polydipsia. Genitourinary:  Negative for dysuria, flank pain, frequency, hematuria and urgency. Musculoskeletal:  Negative for back pain, myalgias and neck pain. Skin:  Negative for rash. Allergic/Immunologic: Negative for environmental allergies. Neurological:  Negative for dizziness, tremors, seizures, weakness and headaches. Hematological:  Does not bruise/bleed easily. Psychiatric/Behavioral:  Negative for hallucinations and suicidal ideas. The patient is not nervous/anxious. Objective:   Physical Exam  Constitutional:       General: She is not in acute distress. Appearance: She is well-developed. HENT:      Head: Normocephalic and atraumatic. Right Ear: External ear normal.      Left Ear: External ear normal.      Nose: Nose normal.      Mouth/Throat:      Mouth: Mucous membranes are moist.   Eyes:      Conjunctiva/sclera: Conjunctivae normal.      Pupils: Pupils are equal, round, and reactive to light. Cardiovascular:      Rate and Rhythm: Normal rate and regular rhythm. Pulses: Normal pulses.       Heart sounds: Normal heart sounds. Pulmonary:      Effort: Pulmonary effort is normal.      Breath sounds: Normal breath sounds. Abdominal:      General: Bowel sounds are normal. There is no distension. Palpations: Abdomen is soft. Tenderness: There is no abdominal tenderness. Musculoskeletal:         General: Normal range of motion. Cervical back: Normal range of motion and neck supple. Skin:     General: Skin is warm and dry. Neurological:      General: No focal deficit present. Mental Status: She is alert and oriented to person, place, and time. Mental status is at baseline. Psychiatric:         Mood and Affect: Mood normal.         Behavior: Behavior normal.         Thought Content: Thought content normal.         Judgment: Judgment normal.        Vitals:    03/09/23 1319   BP: 116/68   Pulse: 51   Temp: 97.2 °F (36.2 °C)   SpO2: 97%         Assessment:      Diagnosis Orders   1. Screen for colon cancer        2. Essential hypertension        3. Acquired hypothyroidism        4. Menopausal state        5. Hypothyroidism due to Hashimoto's thyroiditis        6. Osteopenia of multiple sites        7. Seasonal allergies        8. Class 1 obesity due to excess calories without serious comorbidity with body mass index (BMI) of 34.0 to 34.9 in adult        9. Vitamin D deficiency        10. Screening, lipid        11.  Hyperglycemia              Plan:     Orders Placed This Encounter   Procedures    CBC    Comprehensive Metabolic Panel    Hemoglobin A1C    Lipid Panel    Vitamin D 25 Hydroxy    Insulin, Free    T4, Free    T3, Free    TSH    POCT Fecal Immunochemical Test (FIT)       Outpatient Encounter Medications as of 3/9/2023   Medication Sig Dispense Refill    PARoxetine (PAXIL) 10 MG tablet Take 1 tablet by mouth daily 90 tablet 3    bisoprolol (ZEBETA) 5 MG tablet Take 1 tablet by mouth daily 90 tablet 3    alendronate (FOSAMAX) 70 MG tablet Take 1 tablet by mouth every 7 days Take with a full glass of water upon arising for the day. Consume on an empty stomach at least 30 minutes before the first food, beverage, or medication. Stay upright (do not lie down) for at least 30 minutes. Do not crush or break. 12 tablet 3    albuterol sulfate HFA (PROAIR HFA) 108 (90 Base) MCG/ACT inhaler Inhale 2 puffs into the lungs every 6 hours as needed for Wheezing 1 each 1    fexofenadine (ALLEGRA) 180 MG tablet Take 1 tablet by mouth daily 90 tablet 1    levothyroxine (SYNTHROID) 112 MCG tablet Take 1 tablet by mouth daily 30 tablet 5    Cholecalciferol (VITAMIN D3) 50 MCG (2000 UT) CAPS Take 2 capsules by mouth daily 180 capsule 1    calcium carbonate 648 MG TABS Take 1 tablet by mouth 2 times daily 180 tablet 5    Omega-3 1000 MG CAPS Take 900 mg by mouth      Semaglutide,0.25 or 0.5MG/DOS, (OZEMPIC, 0.25 OR 0.5 MG/DOSE,) 2 MG/1.5ML SOPN Inject 0.5 mg into the skin once a week (Patient not taking: Reported on 3/9/2023) 4 pen 0    fluticasone-salmeterol (ADVAIR HFA) 115-21 MCG/ACT inhaler Inhale 2 puffs into the lungs 2 times daily (Patient not taking: Reported on 3/9/2023) 3 each 1    [DISCONTINUED] PARoxetine (PAXIL) 10 MG tablet TAKE 1 TABLET BY MOUTH ONE TIME A DAY 90 tablet 1     No facility-administered encounter medications on file as of 3/9/2023.      20 minutes spent with patient counseling/educating on acute/chronic medical health problems, medications,  along with treatment options. Reviewed multiple labs/imaging/medications with patient during this time. Following Diet discussion/education was done on Weight Lose Diet. In addition Exercise plan and depression screen were discussed. Recent labs/imaging were discussed and reviewed with patient.

## 2023-03-12 DIAGNOSIS — I10 ESSENTIAL HYPERTENSION: ICD-10-CM

## 2023-03-13 RX ORDER — PAROXETINE 10 MG/1
TABLET, FILM COATED ORAL
Qty: 90 TABLET | Refills: 1 | Status: SHIPPED | OUTPATIENT
Start: 2023-03-13

## 2023-03-15 ENCOUNTER — HOSPITAL ENCOUNTER (OUTPATIENT)
Age: 53
Setting detail: SPECIMEN
Discharge: HOME | End: 2023-03-15

## 2023-03-15 DIAGNOSIS — E66.09 CLASS 1 OBESITY DUE TO EXCESS CALORIES WITHOUT SERIOUS COMORBIDITY WITH BODY MASS INDEX (BMI) OF 34.0 TO 34.9 IN ADULT: ICD-10-CM

## 2023-03-15 DIAGNOSIS — Z13.220 SCREENING, LIPID: ICD-10-CM

## 2023-03-15 DIAGNOSIS — R73.9 HYPERGLYCEMIA: ICD-10-CM

## 2023-03-15 DIAGNOSIS — E55.9 VITAMIN D DEFICIENCY: ICD-10-CM

## 2023-03-15 DIAGNOSIS — E06.3 HYPOTHYROIDISM DUE TO HASHIMOTO'S THYROIDITIS: ICD-10-CM

## 2023-03-15 DIAGNOSIS — E03.8 HYPOTHYROIDISM DUE TO HASHIMOTO'S THYROIDITIS: ICD-10-CM

## 2023-03-15 DIAGNOSIS — E03.9 HYPOTHYROIDISM, UNSPECIFIED TYPE: Primary | ICD-10-CM

## 2023-03-15 DIAGNOSIS — E03.9 ACQUIRED HYPOTHYROIDISM: ICD-10-CM

## 2023-03-15 LAB
25(OH)D3 SERPL-MCNC: 58.4 NG/ML
ALBUMIN SERPL-MCNC: 4.1 G/DL (ref 3.5–5.2)
ALBUMIN/GLOBULIN RATIO: 1.1 (ref 1–2.5)
ALP SERPL-CCNC: 72 U/L (ref 35–104)
ALT SERPL-CCNC: 13 U/L (ref 5–33)
ANION GAP SERPL CALCULATED.3IONS-SCNC: 11 MMOL/L (ref 9–17)
AST SERPL-CCNC: 15 U/L
BILIRUB SERPL-MCNC: 0.8 MG/DL (ref 0.3–1.2)
BUN SERPL-MCNC: 9 MG/DL (ref 6–20)
CALCIUM SERPL-MCNC: 9.3 MG/DL (ref 8.6–10.4)
CHLORIDE SERPL-SCNC: 103 MMOL/L (ref 98–107)
CHOLEST SERPL-MCNC: 191 MG/DL
CHOLESTEROL/HDL RATIO: 3.5
CO2 SERPL-SCNC: 25 MMOL/L (ref 20–31)
CONTROL: NORMAL
CREAT SERPL-MCNC: 0.86 MG/DL (ref 0.5–0.9)
EST. AVERAGE GLUCOSE BLD GHB EST-MCNC: 117 MG/DL
GFR SERPL CREATININE-BSD FRML MDRD: >60 ML/MIN/1.73M2
GLUCOSE SERPL-MCNC: 84 MG/DL (ref 70–99)
HBA1C MFR BLD: 5.7 % (ref 4–6)
HCT VFR BLD AUTO: 40.9 % (ref 36.3–47.1)
HDLC SERPL-MCNC: 55 MG/DL
HEMOCCULT STL QL: NORMAL
HGB BLD-MCNC: 13 G/DL (ref 11.9–15.1)
LDLC SERPL CALC-MCNC: 114 MG/DL (ref 0–130)
MCH RBC QN AUTO: 30 PG (ref 25.2–33.5)
MCHC RBC AUTO-ENTMCNC: 31.8 G/DL (ref 28.4–34.8)
MCV RBC AUTO: 94.2 FL (ref 82.6–102.9)
NRBC AUTOMATED: 0 PER 100 WBC
PDW BLD-RTO: 12.5 % (ref 11.8–14.4)
PLATELET # BLD AUTO: 270 K/UL (ref 138–453)
PMV BLD AUTO: 10.2 FL (ref 8.1–13.5)
POTASSIUM SERPL-SCNC: 4 MMOL/L (ref 3.7–5.3)
PROT SERPL-MCNC: 7.7 G/DL (ref 6.4–8.3)
RBC # BLD: 4.34 M/UL (ref 3.95–5.11)
SODIUM SERPL-SCNC: 139 MMOL/L (ref 135–144)
T3FREE SERPL-MCNC: 1.83 PG/ML (ref 2.02–4.43)
T4 FREE SERPL-MCNC: 1.08 NG/DL (ref 0.93–1.7)
TRIGL SERPL-MCNC: 110 MG/DL
TSH SERPL-ACNC: 9.9 UIU/ML (ref 0.3–5)
WBC # BLD AUTO: 6.8 K/UL (ref 3.5–11.3)

## 2023-03-15 RX ORDER — LEVOTHYROXINE SODIUM 137 UG/1
137 TABLET ORAL DAILY
Qty: 90 TABLET | Refills: 0 | Status: SHIPPED | OUTPATIENT
Start: 2023-03-15

## 2023-03-16 DIAGNOSIS — Z12.11 SCREEN FOR COLON CANCER: ICD-10-CM

## 2023-03-16 PROCEDURE — 82274 ASSAY TEST FOR BLOOD FECAL: CPT | Performed by: FAMILY MEDICINE

## 2023-03-18 LAB
INSULIN FREE: 10 UIU/ML (ref 3–25)
INSULIN: 17 UIU/ML (ref 3–25)

## 2023-03-22 ENCOUNTER — HOSPITAL ENCOUNTER (OUTPATIENT)
Age: 53
Setting detail: SPECIMEN
Discharge: HOME | End: 2023-03-22

## 2023-03-22 DIAGNOSIS — Z02.1 PHYSICAL EXAM, PRE-EMPLOYMENT: ICD-10-CM

## 2023-03-27 LAB
3-OH-COTININE URINE: <50 NG/ML
ANABASINE URINE: <5 NG/ML
COTININE, URINE: <15 NG/ML
NICOTINE URINE: <15 NG/ML

## 2023-06-22 ENCOUNTER — OFFICE VISIT (OUTPATIENT)
Dept: FAMILY MEDICINE CLINIC | Age: 53
End: 2023-06-22
Payer: COMMERCIAL

## 2023-06-22 ENCOUNTER — HOSPITAL ENCOUNTER (OUTPATIENT)
Age: 53
Setting detail: SPECIMEN
Discharge: HOME | End: 2023-06-22

## 2023-06-22 VITALS
BODY MASS INDEX: 34.98 KG/M2 | OXYGEN SATURATION: 100 % | TEMPERATURE: 97.3 F | DIASTOLIC BLOOD PRESSURE: 76 MMHG | HEART RATE: 68 BPM | WEIGHT: 197.4 LBS | HEIGHT: 63 IN | SYSTOLIC BLOOD PRESSURE: 116 MMHG

## 2023-06-22 DIAGNOSIS — E03.9 HYPOTHYROIDISM, UNSPECIFIED TYPE: ICD-10-CM

## 2023-06-22 DIAGNOSIS — E78.5 DYSLIPIDEMIA: ICD-10-CM

## 2023-06-22 DIAGNOSIS — M85.89 OSTEOPENIA OF MULTIPLE SITES: ICD-10-CM

## 2023-06-22 DIAGNOSIS — E66.09 CLASS 1 OBESITY DUE TO EXCESS CALORIES WITHOUT SERIOUS COMORBIDITY WITH BODY MASS INDEX (BMI) OF 34.0 TO 34.9 IN ADULT: ICD-10-CM

## 2023-06-22 DIAGNOSIS — E03.8 HYPOTHYROIDISM DUE TO HASHIMOTO'S THYROIDITIS: ICD-10-CM

## 2023-06-22 DIAGNOSIS — E06.3 HYPOTHYROIDISM DUE TO HASHIMOTO'S THYROIDITIS: ICD-10-CM

## 2023-06-22 DIAGNOSIS — E89.2 S/P PARATHYROIDECTOMY (HCC): ICD-10-CM

## 2023-06-22 DIAGNOSIS — I10 ESSENTIAL HYPERTENSION: Primary | ICD-10-CM

## 2023-06-22 LAB
T3FREE SERPL-MCNC: 2.08 PG/ML (ref 2.02–4.43)
T4 FREE SERPL-MCNC: 1.4 NG/DL (ref 0.9–1.7)
TSH SERPL DL<=0.05 MIU/L-ACNC: 9.22 UIU/ML (ref 0.3–5)

## 2023-06-22 PROCEDURE — 3078F DIAST BP <80 MM HG: CPT | Performed by: FAMILY MEDICINE

## 2023-06-22 PROCEDURE — 3074F SYST BP LT 130 MM HG: CPT | Performed by: FAMILY MEDICINE

## 2023-06-22 PROCEDURE — 99214 OFFICE O/P EST MOD 30 MIN: CPT | Performed by: FAMILY MEDICINE

## 2023-06-22 RX ORDER — PHENTERMINE HYDROCHLORIDE 37.5 MG/1
37.5 CAPSULE ORAL EVERY MORNING
Qty: 30 CAPSULE | Refills: 0 | Status: SHIPPED | OUTPATIENT
Start: 2023-06-22 | End: 2023-07-22

## 2023-06-22 RX ORDER — LOSARTAN POTASSIUM 50 MG/1
50 TABLET ORAL DAILY
Qty: 90 TABLET | Refills: 1 | Status: SHIPPED | OUTPATIENT
Start: 2023-06-22

## 2023-06-22 ASSESSMENT — ENCOUNTER SYMPTOMS
PHOTOPHOBIA: 0
BACK PAIN: 0
DIARRHEA: 0
VOMITING: 0
BLOOD IN STOOL: 0
EYE REDNESS: 0
ABDOMINAL PAIN: 0
NAUSEA: 0
COUGH: 0
SHORTNESS OF BREATH: 0
STRIDOR: 0
CONSTIPATION: 0
EYE DISCHARGE: 0
EYE PAIN: 0
SORE THROAT: 0

## 2023-06-22 ASSESSMENT — ANXIETY QUESTIONNAIRES
5. BEING SO RESTLESS THAT IT IS HARD TO SIT STILL: 0
7. FEELING AFRAID AS IF SOMETHING AWFUL MIGHT HAPPEN: 0
GAD7 TOTAL SCORE: 0
6. BECOMING EASILY ANNOYED OR IRRITABLE: 0
IF YOU CHECKED OFF ANY PROBLEMS ON THIS QUESTIONNAIRE, HOW DIFFICULT HAVE THESE PROBLEMS MADE IT FOR YOU TO DO YOUR WORK, TAKE CARE OF THINGS AT HOME, OR GET ALONG WITH OTHER PEOPLE: NOT DIFFICULT AT ALL
3. WORRYING TOO MUCH ABOUT DIFFERENT THINGS: 0
4. TROUBLE RELAXING: 0
1. FEELING NERVOUS, ANXIOUS, OR ON EDGE: 0
2. NOT BEING ABLE TO STOP OR CONTROL WORRYING: 0

## 2023-06-22 ASSESSMENT — PATIENT HEALTH QUESTIONNAIRE - PHQ9
SUM OF ALL RESPONSES TO PHQ QUESTIONS 1-9: 0
SUM OF ALL RESPONSES TO PHQ QUESTIONS 1-9: 0
1. LITTLE INTEREST OR PLEASURE IN DOING THINGS: 0
SUM OF ALL RESPONSES TO PHQ QUESTIONS 1-9: 0
SUM OF ALL RESPONSES TO PHQ QUESTIONS 1-9: 0
SUM OF ALL RESPONSES TO PHQ9 QUESTIONS 1 & 2: 0
SUM OF ALL RESPONSES TO PHQ9 QUESTIONS 1 & 2: 0
1. LITTLE INTEREST OR PLEASURE IN DOING THINGS: 0
SUM OF ALL RESPONSES TO PHQ QUESTIONS 1-9: 0
2. FEELING DOWN, DEPRESSED OR HOPELESS: 0
SUM OF ALL RESPONSES TO PHQ QUESTIONS 1-9: 0
SUM OF ALL RESPONSES TO PHQ QUESTIONS 1-9: 0
2. FEELING DOWN, DEPRESSED OR HOPELESS: 0
SUM OF ALL RESPONSES TO PHQ QUESTIONS 1-9: 0

## 2023-06-22 NOTE — PROGRESS NOTES
Subjective:      Patient ID: Brandi Gautheir Dr. is a 52 y.o. female. States started back on losartan and tolerating it ok. States took last Rx from last visit. Stopped the [employer]. States wants to stop the ozempic as it makes her tired and dizzy . States did not recheck thyroid after increasing the thyroid dose- will recheck today. States used adipex in the past for 3 months and lost 30 lbs - states did gain weight back slowly after . Would like like to start back on this for a short time. Addendum:  Patient messaged me later in the day after completing thyroid labs that she has been prescribing herself a lower dose and taking that instead of the 137 mcg that I prescribed- she does not want thyroid levels to be too high due to her H/O osteopenia and elyte problems per patient. I recommended that she take dose of thyroid as recommended and get the thyroid function to WNL before attempting meds like adipex that are potentially dangerous. She wanted brand name synthroid- I did slightly increase dose of 150 mcg and recommended follow labs up on 7/20/23   Review of Systems   Constitutional:  Negative for chills and fever. HENT:  Negative for congestion, ear pain, hearing loss, nosebleeds, sore throat and tinnitus. Eyes:  Negative for photophobia, pain, discharge and redness. Respiratory:  Negative for cough, shortness of breath and stridor. Cardiovascular:  Negative for chest pain, palpitations and leg swelling. Gastrointestinal:  Negative for abdominal pain, blood in stool, constipation, diarrhea, nausea and vomiting. Endocrine: Negative for polydipsia. Genitourinary:  Negative for dysuria, flank pain, frequency, hematuria and urgency. Musculoskeletal:  Negative for back pain, myalgias and neck pain. Skin:  Negative for rash. Allergic/Immunologic: Negative for environmental allergies. Neurological:  Negative for dizziness, tremors, seizures, weakness and headaches.    Hematological:

## 2023-06-23 RX ORDER — LEVOTHYROXINE SODIUM 0.15 MG/1
150 TABLET ORAL DAILY
Qty: 90 TABLET | Refills: 1 | Status: SHIPPED | OUTPATIENT
Start: 2023-06-23

## 2023-06-23 NOTE — ADDENDUM NOTE
Ongoing SW/CM Assessment/Plan of Care Note     See SW/CM flowsheets for goals and other objective data.    Progress note:  Spoke with Judi at [facility] [phone]. Judi states, [facility]air [facility] Z[facility] meeting to [facility]. Daughter can call Judi and schedule Zoom appt. @ [phone].Updated Di COCHRAN   Current Status    Nutrition/SLP - Recommendations: Home when medically cleared  Current Mental Status: Quiet  Stressors:      Barriers to discharge:   NONE    Discharge plan:  Home    CM/SW team to continue to follow for discharge needs.     Addended by: Gilberto Puente on: 6/23/2023 11:13 AM     Modules accepted: Orders

## 2023-06-26 ENCOUNTER — TELEPHONE (OUTPATIENT)
Dept: FAMILY MEDICINE CLINIC | Age: 53
End: 2023-06-26

## 2023-09-25 ENCOUNTER — HOSPITAL ENCOUNTER (OUTPATIENT)
Age: 53
Setting detail: SPECIMEN
Discharge: HOME | End: 2023-09-25

## 2023-09-25 DIAGNOSIS — E03.8 HYPOTHYROIDISM DUE TO HASHIMOTO'S THYROIDITIS: ICD-10-CM

## 2023-09-25 DIAGNOSIS — E06.3 HYPOTHYROIDISM DUE TO HASHIMOTO'S THYROIDITIS: ICD-10-CM

## 2023-09-25 LAB
T3FREE SERPL-MCNC: 1.97 PG/ML (ref 2.02–4.43)
T4 FREE SERPL-MCNC: 1.4 NG/DL (ref 0.9–1.7)
TSH SERPL DL<=0.05 MIU/L-ACNC: 2.43 UIU/ML (ref 0.3–5)

## 2023-09-29 LAB
THYROGLOBULIN AB: 18 IU/ML (ref 0–40)
THYROPEROXIDASE AB SERPL IA-ACNC: 8.6 IU/ML (ref 0–25)

## 2023-11-29 ENCOUNTER — OFFICE VISIT (OUTPATIENT)
Dept: FAMILY MEDICINE CLINIC | Age: 53
End: 2023-11-29
Payer: COMMERCIAL

## 2023-11-29 VITALS
HEART RATE: 63 BPM | SYSTOLIC BLOOD PRESSURE: 120 MMHG | OXYGEN SATURATION: 98 % | WEIGHT: 198 LBS | BODY MASS INDEX: 35.08 KG/M2 | TEMPERATURE: 97 F | DIASTOLIC BLOOD PRESSURE: 79 MMHG | HEIGHT: 63 IN

## 2023-11-29 DIAGNOSIS — N95.9 PERIMENOPAUSAL DISORDER: ICD-10-CM

## 2023-11-29 DIAGNOSIS — R06.02 SOB (SHORTNESS OF BREATH): Primary | ICD-10-CM

## 2023-11-29 DIAGNOSIS — J45.40 MODERATE PERSISTENT ASTHMA WITHOUT COMPLICATION: ICD-10-CM

## 2023-11-29 DIAGNOSIS — Z23 NEED FOR PNEUMOCOCCAL 20-VALENT CONJUGATE VACCINATION: ICD-10-CM

## 2023-11-29 DIAGNOSIS — M85.89 OSTEOPENIA OF MULTIPLE SITES: ICD-10-CM

## 2023-11-29 DIAGNOSIS — E78.5 DYSLIPIDEMIA: ICD-10-CM

## 2023-11-29 DIAGNOSIS — E03.8 HYPOTHYROIDISM DUE TO HASHIMOTO'S THYROIDITIS: ICD-10-CM

## 2023-11-29 DIAGNOSIS — E66.09 CLASS 2 OBESITY DUE TO EXCESS CALORIES WITHOUT SERIOUS COMORBIDITY WITH BODY MASS INDEX (BMI) OF 35.0 TO 35.9 IN ADULT: ICD-10-CM

## 2023-11-29 DIAGNOSIS — Z12.11 SCREEN FOR COLON CANCER: ICD-10-CM

## 2023-11-29 DIAGNOSIS — I10 ESSENTIAL HYPERTENSION: ICD-10-CM

## 2023-11-29 DIAGNOSIS — Z01.89 ENCOUNTER FOR SCREENING FOR TOBACCO USE: ICD-10-CM

## 2023-11-29 DIAGNOSIS — Z12.31 SCREENING MAMMOGRAM FOR BREAST CANCER: ICD-10-CM

## 2023-11-29 DIAGNOSIS — M81.0 OSTEOPOROSIS WITHOUT CURRENT PATHOLOGICAL FRACTURE, UNSPECIFIED OSTEOPOROSIS TYPE: ICD-10-CM

## 2023-11-29 DIAGNOSIS — E06.3 HYPOTHYROIDISM DUE TO HASHIMOTO'S THYROIDITIS: ICD-10-CM

## 2023-11-29 PROBLEM — E66.812 CLASS 2 OBESITY IN ADULT: Status: ACTIVE | Noted: 2020-09-16

## 2023-11-29 PROCEDURE — 3078F DIAST BP <80 MM HG: CPT | Performed by: FAMILY MEDICINE

## 2023-11-29 PROCEDURE — 90471 IMMUNIZATION ADMIN: CPT | Performed by: FAMILY MEDICINE

## 2023-11-29 PROCEDURE — 90677 PCV20 VACCINE IM: CPT | Performed by: FAMILY MEDICINE

## 2023-11-29 PROCEDURE — 99214 OFFICE O/P EST MOD 30 MIN: CPT | Performed by: FAMILY MEDICINE

## 2023-11-29 PROCEDURE — 3074F SYST BP LT 130 MM HG: CPT | Performed by: FAMILY MEDICINE

## 2023-11-29 RX ORDER — ALENDRONATE SODIUM 70 MG/1
70 TABLET ORAL
Qty: 12 TABLET | Refills: 3 | Status: SHIPPED | OUTPATIENT
Start: 2023-11-29

## 2023-11-29 RX ORDER — PAROXETINE 10 MG/1
10 TABLET, FILM COATED ORAL DAILY
Qty: 90 TABLET | Refills: 1 | Status: SHIPPED | OUTPATIENT
Start: 2023-11-29

## 2023-11-29 RX ORDER — LOSARTAN POTASSIUM 50 MG/1
50 TABLET ORAL DAILY
Qty: 90 TABLET | Refills: 1 | Status: SHIPPED | OUTPATIENT
Start: 2023-11-29

## 2023-11-29 ASSESSMENT — PATIENT HEALTH QUESTIONNAIRE - PHQ9
SUM OF ALL RESPONSES TO PHQ QUESTIONS 1-9: 0
SUM OF ALL RESPONSES TO PHQ9 QUESTIONS 1 & 2: 0
SUM OF ALL RESPONSES TO PHQ QUESTIONS 1-9: 0
1. LITTLE INTEREST OR PLEASURE IN DOING THINGS: 0
SUM OF ALL RESPONSES TO PHQ QUESTIONS 1-9: 0
SUM OF ALL RESPONSES TO PHQ9 QUESTIONS 1 & 2: 0
SUM OF ALL RESPONSES TO PHQ QUESTIONS 1-9: 0
1. LITTLE INTEREST OR PLEASURE IN DOING THINGS: 0
2. FEELING DOWN, DEPRESSED OR HOPELESS: 0
2. FEELING DOWN, DEPRESSED OR HOPELESS: 0
SUM OF ALL RESPONSES TO PHQ QUESTIONS 1-9: 0
SUM OF ALL RESPONSES TO PHQ9 QUESTIONS 1 & 2: 0
SUM OF ALL RESPONSES TO PHQ QUESTIONS 1-9: 0
SUM OF ALL RESPONSES TO PHQ QUESTIONS 1-9: 0
2. FEELING DOWN, DEPRESSED OR HOPELESS: 0
1. LITTLE INTEREST OR PLEASURE IN DOING THINGS: 0
SUM OF ALL RESPONSES TO PHQ QUESTIONS 1-9: 0

## 2023-11-29 ASSESSMENT — ANXIETY QUESTIONNAIRES
IF YOU CHECKED OFF ANY PROBLEMS ON THIS QUESTIONNAIRE, HOW DIFFICULT HAVE THESE PROBLEMS MADE IT FOR YOU TO DO YOUR WORK, TAKE CARE OF THINGS AT HOME, OR GET ALONG WITH OTHER PEOPLE: NOT DIFFICULT AT ALL
2. NOT BEING ABLE TO STOP OR CONTROL WORRYING: 0
IF YOU CHECKED OFF ANY PROBLEMS ON THIS QUESTIONNAIRE, HOW DIFFICULT HAVE THESE PROBLEMS MADE IT FOR YOU TO DO YOUR WORK, TAKE CARE OF THINGS AT HOME, OR GET ALONG WITH OTHER PEOPLE: NOT DIFFICULT AT ALL
GAD7 TOTAL SCORE: 0
6. BECOMING EASILY ANNOYED OR IRRITABLE: 0
6. BECOMING EASILY ANNOYED OR IRRITABLE: 0
GAD7 TOTAL SCORE: 0
7. FEELING AFRAID AS IF SOMETHING AWFUL MIGHT HAPPEN: 0
1. FEELING NERVOUS, ANXIOUS, OR ON EDGE: 0
3. WORRYING TOO MUCH ABOUT DIFFERENT THINGS: 0
3. WORRYING TOO MUCH ABOUT DIFFERENT THINGS: 0
7. FEELING AFRAID AS IF SOMETHING AWFUL MIGHT HAPPEN: 0
1. FEELING NERVOUS, ANXIOUS, OR ON EDGE: 0
2. NOT BEING ABLE TO STOP OR CONTROL WORRYING: 0
4. TROUBLE RELAXING: 0
5. BEING SO RESTLESS THAT IT IS HARD TO SIT STILL: 0
4. TROUBLE RELAXING: 0
5. BEING SO RESTLESS THAT IT IS HARD TO SIT STILL: 0

## 2023-11-29 ASSESSMENT — ENCOUNTER SYMPTOMS
PHOTOPHOBIA: 0
STRIDOR: 0
BLOOD IN STOOL: 0
CONSTIPATION: 0
COUGH: 0
EYE DISCHARGE: 0
SHORTNESS OF BREATH: 0
EYE REDNESS: 0
EYE PAIN: 0
DIARRHEA: 0
BACK PAIN: 0
SORE THROAT: 0
NAUSEA: 0
VOMITING: 0
ABDOMINAL PAIN: 0

## 2023-11-29 NOTE — PROGRESS NOTES
Subjective:      Patient ID: Beatriz Haque Dr. is a 52 y.o. female.  was taking advair as she is SOB with cold weather - states long time ago she had PFT and told   She had asthma.  used to be on singulair in the past for years- almost a decade- but stopped   3 years ago.  has severe allergies and takes allegra daily and occasionally flonase. States since last winter she has been using PRN advair/dulara samples  Waist 33 inches  Review of Systems   Constitutional:  Negative for chills and fever. HENT:  Negative for congestion, ear pain, hearing loss, nosebleeds, sore throat and tinnitus. Eyes:  Negative for photophobia, pain, discharge and redness. Respiratory:  Negative for cough, shortness of breath and stridor. Cardiovascular:  Negative for chest pain, palpitations and leg swelling. Gastrointestinal:  Negative for abdominal pain, blood in stool, constipation, diarrhea, nausea and vomiting. Endocrine: Negative for polydipsia. Genitourinary:  Negative for dysuria, flank pain, frequency, hematuria and urgency. Musculoskeletal:  Negative for back pain, myalgias and neck pain. Skin:  Negative for rash. Allergic/Immunologic: Negative for environmental allergies. Neurological:  Negative for dizziness, tremors, seizures, weakness and headaches. Hematological:  Does not bruise/bleed easily. Psychiatric/Behavioral:  Negative for hallucinations and suicidal ideas. The patient is not nervous/anxious. Objective:   Physical Exam  Constitutional:       General: She is not in acute distress. Appearance: Normal appearance. She is well-developed. She is obese. HENT:      Head: Normocephalic and atraumatic.       Right Ear: External ear normal.      Left Ear: External ear normal.      Nose: Nose normal.      Mouth/Throat:      Mouth: Mucous membranes are moist.   Eyes:      Conjunctiva/sclera: Conjunctivae normal.      Pupils: Pupils are equal, round, and reactive to

## 2023-11-29 NOTE — TELEPHONE ENCOUNTER
Dori Laurent Dr. is calling to request a refill on the following medication(s):    Last Visit Date (If Applicable):  98/16/4778    Next Visit Date:    3/25/2024    Medication Request:  Requested Prescriptions      No prescriptions requested or ordered in this encounter

## 2023-12-07 ENCOUNTER — HOSPITAL ENCOUNTER (OUTPATIENT)
Dept: PULMONOLOGY | Age: 53
Discharge: HOME | End: 2023-12-07
Payer: COMMERCIAL

## 2023-12-07 DIAGNOSIS — R06.02 SOB (SHORTNESS OF BREATH): ICD-10-CM

## 2023-12-07 LAB
DLCO %PRED: NORMAL
DLCO PRED: NORMAL
DLCO/VA %PRED: NORMAL
DLCO/VA PRED: NORMAL
DLCO/VA: NORMAL
DLCO: NORMAL
EXPIRATORY TIME-POST: NORMAL
EXPIRATORY TIME: NORMAL
FEF 25-75% %CHNG: NORMAL
FEF 25-75% %PRED-POST: NORMAL
FEF 25-75% %PRED-PRE: NORMAL
FEF 25-75% PRED: NORMAL
FEF 25-75%-POST: NORMAL
FEF 25-75%-PRE: NORMAL
FEV1 %PRED-POST: NORMAL
FEV1 %PRED-PRE: NORMAL
FEV1 PRED: NORMAL
FEV1-POST: NORMAL
FEV1-PRE: NORMAL
FEV1/FVC %PRED-POST: NORMAL
FEV1/FVC %PRED-PRE: NORMAL
FEV1/FVC PRED: NORMAL
FEV1/FVC-POST: NORMAL
FEV1/FVC-PRE: NORMAL
FVC %PRED-POST: NORMAL
FVC %PRED-PRE: NORMAL
FVC PRED: NORMAL
FVC-POST: NORMAL
FVC-PRE: NORMAL
GAW %PRED: NORMAL
GAW PRED: NORMAL
GAW: NORMAL
IC %PRED: NORMAL
IC PRED: NORMAL
IC: NORMAL
MEP: NORMAL
MIP: NORMAL
MVV %PRED-PRE: NORMAL
MVV PRED: NORMAL
MVV-PRE: NORMAL
PEF %PRED-POST: NORMAL
PEF %PRED-PRE: NORMAL
PEF PRED: NORMAL
PEF%CHNG: NORMAL
PEF-POST: NORMAL
PEF-PRE: NORMAL
RAW %PRED: NORMAL
RAW PRED: NORMAL
RAW: NORMAL
RV %PRED: NORMAL
RV PRED: NORMAL
RV: NORMAL
SVC %PRED: NORMAL
SVC PRED: NORMAL
SVC: NORMAL
TLC %PRED: NORMAL
TLC PRED: NORMAL
TLC: NORMAL
VA %PRED: NORMAL
VA PRED: NORMAL
VA: NORMAL
VTG %PRED: NORMAL
VTG PRED: NORMAL
VTG: NORMAL

## 2023-12-07 PROCEDURE — 6370000000 HC RX 637 (ALT 250 FOR IP): Performed by: FAMILY MEDICINE

## 2023-12-07 PROCEDURE — 94060 EVALUATION OF WHEEZING: CPT

## 2023-12-07 PROCEDURE — 94729 DIFFUSING CAPACITY: CPT

## 2023-12-07 PROCEDURE — 94726 PLETHYSMOGRAPHY LUNG VOLUMES: CPT

## 2023-12-07 RX ORDER — ALBUTEROL SULFATE 90 UG/1
2 AEROSOL, METERED RESPIRATORY (INHALATION) ONCE
Status: COMPLETED | OUTPATIENT
Start: 2023-12-07 | End: 2023-12-07

## 2023-12-07 NOTE — PROCEDURES
Impression:     Normal study with no significant response to bronchodilators.     Igor Brown MD  Pulmonary critical Care and sleep Medicine Actively visual hallucinations . no seizure like activity as per family   Not able to reach  for extra collaterals   -MRI brain 2/17 and 3/1 reviewed-  Patient also had LP on prior visits   Consulted neuro and psych   -completed  UTI treatment , BC negative   Identify GNR  -unable to swallow d/t lethargy. converted meds to IV  -hold sedative meds Trazadone, gabapentin for now   -puree diet and IVF for now. swallow eval when MS improves  Check CT head

## 2024-02-26 ENCOUNTER — HOSPITAL ENCOUNTER (OUTPATIENT)
Dept: MAMMOGRAPHY | Age: 54
Discharge: HOME | End: 2024-02-28
Attending: FAMILY MEDICINE
Payer: COMMERCIAL

## 2024-02-26 DIAGNOSIS — Z12.31 SCREENING MAMMOGRAM FOR BREAST CANCER: ICD-10-CM

## 2024-02-26 PROCEDURE — 77063 BREAST TOMOSYNTHESIS BI: CPT

## 2024-02-29 ENCOUNTER — OFFICE VISIT (OUTPATIENT)
Dept: FAMILY MEDICINE CLINIC | Age: 54
End: 2024-02-29
Payer: COMMERCIAL

## 2024-02-29 VITALS
SYSTOLIC BLOOD PRESSURE: 120 MMHG | HEIGHT: 63 IN | BODY MASS INDEX: 37.17 KG/M2 | WEIGHT: 209.8 LBS | OXYGEN SATURATION: 99 % | DIASTOLIC BLOOD PRESSURE: 60 MMHG | TEMPERATURE: 97 F | HEART RATE: 78 BPM

## 2024-02-29 DIAGNOSIS — E03.9 HYPOTHYROIDISM, UNSPECIFIED TYPE: ICD-10-CM

## 2024-02-29 DIAGNOSIS — E53.9 VITAMIN B DEFICIENCY: ICD-10-CM

## 2024-02-29 DIAGNOSIS — R73.9 HYPERGLYCEMIA: ICD-10-CM

## 2024-02-29 DIAGNOSIS — G47.9 SLEEP DISORDER: ICD-10-CM

## 2024-02-29 DIAGNOSIS — E78.5 DYSLIPIDEMIA: ICD-10-CM

## 2024-02-29 DIAGNOSIS — E55.9 VITAMIN D DEFICIENCY: ICD-10-CM

## 2024-02-29 DIAGNOSIS — R06.02 SOB (SHORTNESS OF BREATH) ON EXERTION: ICD-10-CM

## 2024-02-29 DIAGNOSIS — M81.0 OSTEOPOROSIS, UNSPECIFIED OSTEOPOROSIS TYPE, UNSPECIFIED PATHOLOGICAL FRACTURE PRESENCE: Primary | ICD-10-CM

## 2024-02-29 PROCEDURE — 3074F SYST BP LT 130 MM HG: CPT | Performed by: FAMILY MEDICINE

## 2024-02-29 PROCEDURE — 3078F DIAST BP <80 MM HG: CPT | Performed by: FAMILY MEDICINE

## 2024-02-29 PROCEDURE — 99214 OFFICE O/P EST MOD 30 MIN: CPT | Performed by: FAMILY MEDICINE

## 2024-02-29 ASSESSMENT — PATIENT HEALTH QUESTIONNAIRE - PHQ9
SUM OF ALL RESPONSES TO PHQ QUESTIONS 1-9: 0
2. FEELING DOWN, DEPRESSED OR HOPELESS: 0
1. LITTLE INTEREST OR PLEASURE IN DOING THINGS: 0
SUM OF ALL RESPONSES TO PHQ QUESTIONS 1-9: 0
SUM OF ALL RESPONSES TO PHQ9 QUESTIONS 1 & 2: 0
SUM OF ALL RESPONSES TO PHQ QUESTIONS 1-9: 0
SUM OF ALL RESPONSES TO PHQ QUESTIONS 1-9: 0

## 2024-02-29 ASSESSMENT — ANXIETY QUESTIONNAIRES
IF YOU CHECKED OFF ANY PROBLEMS ON THIS QUESTIONNAIRE, HOW DIFFICULT HAVE THESE PROBLEMS MADE IT FOR YOU TO DO YOUR WORK, TAKE CARE OF THINGS AT HOME, OR GET ALONG WITH OTHER PEOPLE: NOT DIFFICULT AT ALL
6. BECOMING EASILY ANNOYED OR IRRITABLE: 0
2. NOT BEING ABLE TO STOP OR CONTROL WORRYING: 0
4. TROUBLE RELAXING: 0
3. WORRYING TOO MUCH ABOUT DIFFERENT THINGS: 0
5. BEING SO RESTLESS THAT IT IS HARD TO SIT STILL: 0
7. FEELING AFRAID AS IF SOMETHING AWFUL MIGHT HAPPEN: 0
GAD7 TOTAL SCORE: 0
1. FEELING NERVOUS, ANXIOUS, OR ON EDGE: 0

## 2024-02-29 ASSESSMENT — ENCOUNTER SYMPTOMS
BACK PAIN: 0
SORE THROAT: 0
DIARRHEA: 0
PHOTOPHOBIA: 0
EYE PAIN: 0
BLOOD IN STOOL: 0
EYE REDNESS: 0
NAUSEA: 0
SHORTNESS OF BREATH: 1
COUGH: 0
STRIDOR: 0
VOMITING: 0
CONSTIPATION: 0
EYE DISCHARGE: 0
ABDOMINAL PAIN: 0

## 2024-02-29 NOTE — PROGRESS NOTES
Subjective:      Patient ID: Nabila COLON Dr. Pamela is a 53 y.o. female.    State sis in bed for 9 hours - wakes up 2-3 times to go to  .States does not feel rested in am.   Sister states snoring has been a lot more recently. States also had covid 2 months ago and very   fatigued and feels SOB  with exertion.Does elliptical 30 min 4 x a [facility]   States eating better- eating kambucha and cruciferous veggies and sauerkot.  States sees Gyn- spoke to them about hot flashes - getting better - states did not want hormone replacement  Review of Systems   Constitutional:  Positive for activity change and fatigue. Negative for chills and fever.   HENT:  Negative for congestion, ear pain, hearing loss, nosebleeds, sore throat and tinnitus.    Eyes:  Negative for photophobia, pain, discharge and redness.   Respiratory:  Positive for shortness of breath. Negative for cough and stridor.    Cardiovascular:  Negative for chest pain, palpitations and leg swelling.   Gastrointestinal:  Negative for abdominal pain, blood in stool, constipation, diarrhea, nausea and vomiting.   Endocrine: Negative for polydipsia.   Genitourinary:  Negative for dysuria, flank pain, frequency, hematuria and urgency.   Musculoskeletal:  Negative for back pain, myalgias and neck pain.   Skin:  Negative for rash.   Allergic/Immunologic: Negative for environmental allergies.   Neurological:  Negative for dizziness, tremors, seizures, weakness and headaches.   Hematological:  Does not bruise/bleed easily.   Psychiatric/Behavioral:  Positive for sleep disturbance. Negative for hallucinations and suicidal ideas. The patient is not nervous/anxious.      Objective:   Physical Exam  Constitutional:       General: She is not in acute distress.     Appearance: She is well-developed.   HENT:      Head: Normocephalic and atraumatic.      Right Ear: External ear normal.      Left Ear: External ear normal.      Nose: Nose normal.

## 2024-04-18 DIAGNOSIS — I10 ESSENTIAL HYPERTENSION: ICD-10-CM

## 2024-04-18 RX ORDER — PAROXETINE HCL 10 MG
10 TABLET ORAL DAILY
Qty: 90 TABLET | Refills: 1 | Status: SHIPPED | OUTPATIENT
Start: 2024-04-18

## 2024-04-18 RX ORDER — LOSARTAN POTASSIUM 50 MG
50 TABLET ORAL DAILY
Qty: 90 TABLET | Refills: 1 | Status: SHIPPED | OUTPATIENT
Start: 2024-04-18

## 2024-04-18 NOTE — TELEPHONE ENCOUNTER
She needs annual labs completed- to refill the coozar- no updated renal function- will refill rx but please complete labs

## 2024-05-23 ENCOUNTER — HOSPITAL ENCOUNTER (OUTPATIENT)
Age: 54
Discharge: HOME | End: 2024-05-23
Payer: COMMERCIAL

## 2024-05-23 ENCOUNTER — HOSPITAL ENCOUNTER (OUTPATIENT)
Dept: GENERAL RADIOLOGY | Age: 54
Discharge: HOME | End: 2024-05-25
Payer: COMMERCIAL

## 2024-05-23 DIAGNOSIS — R07.9 CHEST PAIN, UNSPECIFIED TYPE: ICD-10-CM

## 2024-05-23 LAB
EKG ATRIAL RATE: 64 BPM
EKG P AXIS: 12 DEGREES
EKG P-R INTERVAL: 134 MS
EKG Q-T INTERVAL: 382 MS
EKG QRS DURATION: 82 MS
EKG QTC CALCULATION (BAZETT): 394 MS
EKG R AXIS: 64 DEGREES
EKG T AXIS: -7 DEGREES
EKG VENTRICULAR RATE: 64 BPM

## 2024-05-23 PROCEDURE — 93005 ELECTROCARDIOGRAM TRACING: CPT | Performed by: FAMILY MEDICINE

## 2024-05-23 PROCEDURE — 71046 X-RAY EXAM CHEST 2 VIEWS: CPT

## 2024-05-29 ENCOUNTER — OFFICE VISIT (OUTPATIENT)
Dept: FAMILY MEDICINE CLINIC | Age: 54
End: 2024-05-29
Payer: COMMERCIAL

## 2024-05-29 VITALS
HEIGHT: 63 IN | SYSTOLIC BLOOD PRESSURE: 118 MMHG | WEIGHT: 206 LBS | DIASTOLIC BLOOD PRESSURE: 74 MMHG | BODY MASS INDEX: 36.5 KG/M2 | TEMPERATURE: 97 F | OXYGEN SATURATION: 99 % | HEART RATE: 58 BPM

## 2024-05-29 DIAGNOSIS — E78.5 DYSLIPIDEMIA: ICD-10-CM

## 2024-05-29 DIAGNOSIS — E03.9 ACQUIRED HYPOTHYROIDISM: ICD-10-CM

## 2024-05-29 DIAGNOSIS — R07.89 OTHER CHEST PAIN: Primary | ICD-10-CM

## 2024-05-29 DIAGNOSIS — E53.9 VITAMIN B DEFICIENCY: ICD-10-CM

## 2024-05-29 DIAGNOSIS — I10 ESSENTIAL HYPERTENSION: ICD-10-CM

## 2024-05-29 DIAGNOSIS — E55.9 VITAMIN D DEFICIENCY: ICD-10-CM

## 2024-05-29 DIAGNOSIS — R73.9 HYPERGLYCEMIA: ICD-10-CM

## 2024-05-29 DIAGNOSIS — E66.09 CLASS 2 OBESITY DUE TO EXCESS CALORIES WITHOUT SERIOUS COMORBIDITY WITH BODY MASS INDEX (BMI) OF 35.0 TO 35.9 IN ADULT: ICD-10-CM

## 2024-05-29 PROCEDURE — 3078F DIAST BP <80 MM HG: CPT | Performed by: FAMILY MEDICINE

## 2024-05-29 PROCEDURE — 99214 OFFICE O/P EST MOD 30 MIN: CPT | Performed by: FAMILY MEDICINE

## 2024-05-29 PROCEDURE — 3074F SYST BP LT 130 MM HG: CPT | Performed by: FAMILY MEDICINE

## 2024-05-29 RX ORDER — SEMAGLUTIDE 1.34 MG/ML
0.5 INJECTION, SOLUTION SUBCUTANEOUS WEEKLY
Qty: 3 ADJUSTABLE DOSE PRE-FILLED PEN SYRINGE | Refills: 3 | Status: SHIPPED | OUTPATIENT
Start: 2024-05-29

## 2024-05-29 SDOH — ECONOMIC STABILITY: INCOME INSECURITY: HOW HARD IS IT FOR YOU TO PAY FOR THE VERY BASICS LIKE FOOD, HOUSING, MEDICAL CARE, AND HEATING?: NOT VERY HARD

## 2024-05-29 SDOH — ECONOMIC STABILITY: FOOD INSECURITY: WITHIN THE PAST 12 MONTHS, YOU WORRIED THAT YOUR FOOD WOULD RUN OUT BEFORE YOU GOT MONEY TO BUY MORE.: NEVER TRUE

## 2024-05-29 SDOH — ECONOMIC STABILITY: FOOD INSECURITY: WITHIN THE PAST 12 MONTHS, THE FOOD YOU BOUGHT JUST DIDN'T LAST AND YOU DIDN'T HAVE MONEY TO GET MORE.: NEVER TRUE

## 2024-05-29 ASSESSMENT — PATIENT HEALTH QUESTIONNAIRE - PHQ9
1. LITTLE INTEREST OR PLEASURE IN DOING THINGS: NOT AT ALL
SUM OF ALL RESPONSES TO PHQ QUESTIONS 1-9: 0
SUM OF ALL RESPONSES TO PHQ QUESTIONS 1-9: 0
2. FEELING DOWN, DEPRESSED OR HOPELESS: NOT AT ALL
SUM OF ALL RESPONSES TO PHQ QUESTIONS 1-9: 0
SUM OF ALL RESPONSES TO PHQ9 QUESTIONS 1 & 2: 0
SUM OF ALL RESPONSES TO PHQ QUESTIONS 1-9: 0

## 2024-05-29 ASSESSMENT — ENCOUNTER SYMPTOMS
NAUSEA: 0
ABDOMINAL PAIN: 0
STRIDOR: 0
DIARRHEA: 0
CONSTIPATION: 0
BACK PAIN: 0
VOMITING: 0
EYE PAIN: 0
BLOOD IN STOOL: 0
PHOTOPHOBIA: 0
EYE REDNESS: 0
SHORTNESS OF BREATH: 0
COUGH: 0
EYE DISCHARGE: 0
SORE THROAT: 0

## 2024-05-29 ASSESSMENT — ANXIETY QUESTIONNAIRES
6. BECOMING EASILY ANNOYED OR IRRITABLE: NOT AT ALL
5. BEING SO RESTLESS THAT IT IS HARD TO SIT STILL: NOT AT ALL
1. FEELING NERVOUS, ANXIOUS, OR ON EDGE: NOT AT ALL
GAD7 TOTAL SCORE: 0
7. FEELING AFRAID AS IF SOMETHING AWFUL MIGHT HAPPEN: NOT AT ALL
IF YOU CHECKED OFF ANY PROBLEMS ON THIS QUESTIONNAIRE, HOW DIFFICULT HAVE THESE PROBLEMS MADE IT FOR YOU TO DO YOUR WORK, TAKE CARE OF THINGS AT HOME, OR GET ALONG WITH OTHER PEOPLE: NOT DIFFICULT AT ALL
2. NOT BEING ABLE TO STOP OR CONTROL WORRYING: NOT AT ALL
3. WORRYING TOO MUCH ABOUT DIFFERENT THINGS: NOT AT ALL
4. TROUBLE RELAXING: NOT AT ALL

## 2024-06-03 ENCOUNTER — OFFICE VISIT (OUTPATIENT)
Dept: PULMONOLOGY | Age: 54
End: 2024-06-03
Payer: COMMERCIAL

## 2024-06-03 VITALS
SYSTOLIC BLOOD PRESSURE: 128 MMHG | DIASTOLIC BLOOD PRESSURE: 87 MMHG | WEIGHT: 200 LBS | OXYGEN SATURATION: 100 % | BODY MASS INDEX: 35.44 KG/M2 | RESPIRATION RATE: 16 BRPM | HEART RATE: 60 BPM | HEIGHT: 63 IN

## 2024-06-03 DIAGNOSIS — J30.2 SEASONAL ALLERGIES: ICD-10-CM

## 2024-06-03 DIAGNOSIS — G47.33 MILD OBSTRUCTIVE SLEEP APNEA: ICD-10-CM

## 2024-06-03 DIAGNOSIS — J45.998 SEASONAL ASTHMA: Primary | ICD-10-CM

## 2024-06-03 PROCEDURE — 99204 OFFICE O/P NEW MOD 45 MIN: CPT | Performed by: INTERNAL MEDICINE

## 2024-06-03 PROCEDURE — 3074F SYST BP LT 130 MM HG: CPT | Performed by: INTERNAL MEDICINE

## 2024-06-03 PROCEDURE — 3079F DIAST BP 80-89 MM HG: CPT | Performed by: INTERNAL MEDICINE

## 2024-06-03 ASSESSMENT — SLEEP AND FATIGUE QUESTIONNAIRES
HOW LIKELY ARE YOU TO NOD OFF OR FALL ASLEEP WHILE LYING DOWN TO REST IN THE AFTERNOON WHEN CIRCUMSTANCES PERMIT: HIGH CHANCE OF DOZING
HOW LIKELY ARE YOU TO NOD OFF OR FALL ASLEEP WHILE SITTING INACTIVE IN A PUBLIC PLACE: WOULD NEVER DOZE
HOW LIKELY ARE YOU TO NOD OFF OR FALL ASLEEP WHILE SITTING QUIETLY AFTER LUNCH WITHOUT ALCOHOL: SLIGHT CHANCE OF DOZING
HOW LIKELY ARE YOU TO NOD OFF OR FALL ASLEEP IN A CAR, WHILE STOPPED FOR A FEW MINUTES IN TRAFFIC: WOULD NEVER DOZE
ESS TOTAL SCORE: 8
HOW LIKELY ARE YOU TO NOD OFF OR FALL ASLEEP WHILE WATCHING TV: SLIGHT CHANCE OF DOZING
HOW LIKELY ARE YOU TO NOD OFF OR FALL ASLEEP WHILE SITTING AND TALKING TO SOMEONE: WOULD NEVER DOZE
HOW LIKELY ARE YOU TO NOD OFF OR FALL ASLEEP WHILE SITTING AND READING: SLIGHT CHANCE OF DOZING
HOW LIKELY ARE YOU TO NOD OFF OR FALL ASLEEP WHEN YOU ARE A PASSENGER IN A CAR FOR AN HOUR WITHOUT A BREAK: MODERATE CHANCE OF DOZING

## 2024-06-03 NOTE — PATIENT INSTRUCTIONS
@[email or web address]@        YOU ARE BEING REFERRED TO:    [facility] (a department of [facility])    [address] [place] [address] [address]  [place] [address]    Main Phone Number: [phone]    Phone number for scheduling sleep study: [phone]      Please contact the above numbers to schedule your sleep study.     Once you have completed the FIRST NIGHT you may be asked to return for a SECOND NIGHT if necessary.   After the SECOND NIGHT the sleep center will order a CPAP/BiPAP machine for you.     An order for the machine will be sent to a durable medical equipment company ([employer]).   The sleep center will provide you with the [employer] companies information.     Once you have your machine please contact our office to schedule a follow up appointment.   Your follow up will be scheduled for a date 30-90 days after you have received your machine.   At that follow up visit we will need to have a compliance download from your DME company.   Please contact your [employer] company to have the compliance download faxed to our office at   [phone] for your follow up appointment.       IF YOU HAVE ANY QUESTIONS ABOUT YOUR SLEEP STUDY   PLEASE CONTACT THE SLEEP CENTER.

## 2024-06-03 NOTE — PROGRESS NOTES
MD   COZAAR 50 MG tablet TAKE 1 TABLET DAILY 4/18/24  Yes Yara Pineda MD   PAXIL 10 MG tablet TAKE 1 TABLET DAILY 4/18/24  Yes Yara Pineda MD   alendronate (FOSAMAX) 70 MG tablet Take 1 tablet by mouth every 7 days Take with a full glass of water upon arising for the day. Consume on an empty stomach at least 30 minutes before the first food, beverage, or medication. Stay upright (do not lie down) for at least 30 minutes. Do not crush or break. 11/29/23  Yes Yara Pineda MD   levothyroxine (SYNTHROID) 150 MCG tablet Take 1 tablet by mouth daily Synthroid Brand 150 mcg PO daily (AUGUSTINE) 6/23/23  Yes Yara Pineda MD   albuterol sulfate HFA (PROAIR HFA) 108 (90 Base) MCG/ACT inhaler Inhale 2 puffs into the lungs every 6 hours as needed for Wheezing 6/10/22  Yes Yara Pineda MD   fexofenadine (ALLEGRA) 180 MG tablet Take 1 tablet by mouth daily 6/10/22  Yes Yara Pineda MD              REVIEW OF SYSTEMS:    CONSTITUTIONAL:  negative for  fevers, chills, sweats, fatigue, malaise, anorexia and weight loss  EYES:  negative for  double vision, blurred vision, dry eyes, eye discharge and redness  HEENT:  negative for  hearing loss, tinnitus, ear drainage, earaches and nasal congestion  RESPIRATORY:  See hpi  CARDIOVASCULAR:  negative for  chest pain,, palpitations, orthopnea, PND  GASTROINTESTINAL:  negative for nausea, vomiting, change in bowel habits, diarrhea, constipation, abdominal pain, pruritus, abdominal mass and abdominal distention  HEMATOLOGIC/LYMPHATIC:  negative for easy bruising, bleeding, lymphadenopathy, petechiae and swelling/edema  ALLERGIC/IMMUNOLOGIC:  negative for recurrent infections, urticaria and drug reactions  ENDOCRINE:  negative for heat intolerance, cold intolerance, tremor, weight changes and change in bowel habits  MUSCULOSKELETAL:  negative for  myalgias, arthralgias, pain, joint swelling, stiff joints and decreased range of motion  NEUROLOGICAL:  negative for headaches,

## 2024-06-05 ENCOUNTER — HOSPITAL ENCOUNTER (OUTPATIENT)
Dept: MAMMOGRAPHY | Facility: CLINIC | Age: 54
Discharge: HOME | End: 2024-06-07
Attending: FAMILY MEDICINE
Payer: COMMERCIAL

## 2024-06-05 ENCOUNTER — HOSPITAL ENCOUNTER (OUTPATIENT)
Age: 54
Setting detail: SPECIMEN
Discharge: HOME | End: 2024-06-05

## 2024-06-05 DIAGNOSIS — R73.9 HYPERGLYCEMIA: ICD-10-CM

## 2024-06-05 DIAGNOSIS — M81.0 OSTEOPOROSIS, UNSPECIFIED OSTEOPOROSIS TYPE, UNSPECIFIED PATHOLOGICAL FRACTURE PRESENCE: ICD-10-CM

## 2024-06-05 DIAGNOSIS — E66.09 CLASS 2 OBESITY DUE TO EXCESS CALORIES WITHOUT SERIOUS COMORBIDITY WITH BODY MASS INDEX (BMI) OF 35.0 TO 35.9 IN ADULT: ICD-10-CM

## 2024-06-05 DIAGNOSIS — E53.9 VITAMIN B DEFICIENCY: ICD-10-CM

## 2024-06-05 DIAGNOSIS — E55.9 VITAMIN D DEFICIENCY: ICD-10-CM

## 2024-06-05 DIAGNOSIS — E78.5 DYSLIPIDEMIA: ICD-10-CM

## 2024-06-05 DIAGNOSIS — E03.9 ACQUIRED HYPOTHYROIDISM: ICD-10-CM

## 2024-06-05 LAB
25(OH)D3 SERPL-MCNC: 45.4 NG/ML (ref 30–100)
ALBUMIN SERPL-MCNC: 4.3 G/DL (ref 3.5–5.2)
ALBUMIN/GLOB SERPL: 1 {RATIO} (ref 1–2.5)
ALP SERPL-CCNC: 73 U/L (ref 35–104)
ALT SERPL-CCNC: 16 U/L (ref 10–35)
ANION GAP SERPL CALCULATED.3IONS-SCNC: 10 MMOL/L (ref 9–16)
AST SERPL-CCNC: 22 U/L (ref 10–35)
BILIRUB SERPL-MCNC: 0.7 MG/DL (ref 0–1.2)
BUN SERPL-MCNC: 9 MG/DL (ref 6–20)
CALCIUM SERPL-MCNC: 9.5 MG/DL (ref 8.6–10.4)
CHLORIDE SERPL-SCNC: 107 MMOL/L (ref 98–107)
CHOLEST SERPL-MCNC: 182 MG/DL (ref 0–199)
CHOLESTEROL/HDL RATIO: 4
CO2 SERPL-SCNC: 26 MMOL/L (ref 20–31)
CREAT SERPL-MCNC: 0.9 MG/DL (ref 0.5–0.9)
ERYTHROCYTE [DISTWIDTH] IN BLOOD BY AUTOMATED COUNT: 12.5 % (ref 11.8–14.4)
EST. AVERAGE GLUCOSE BLD GHB EST-MCNC: 123 MG/DL
FOLATE SERPL-MCNC: >20 NG/ML (ref 4.8–24.2)
GFR, ESTIMATED: 79 ML/MIN/1.73M2
GLUCOSE SERPL-MCNC: 94 MG/DL (ref 74–99)
HBA1C MFR BLD: 5.9 % (ref 4–6)
HCT VFR BLD AUTO: 37.7 % (ref 36.3–47.1)
HDLC SERPL-MCNC: 52 MG/DL
HGB BLD-MCNC: 12.2 G/DL (ref 11.9–15.1)
LDLC SERPL CALC-MCNC: 110 MG/DL (ref 0–100)
MCH RBC QN AUTO: 30.1 PG (ref 25.2–33.5)
MCHC RBC AUTO-ENTMCNC: 32.4 G/DL (ref 28.4–34.8)
MCV RBC AUTO: 93.1 FL (ref 82.6–102.9)
NRBC BLD-RTO: 0 PER 100 WBC
PLATELET # BLD AUTO: 272 K/UL (ref 138–453)
PMV BLD AUTO: 10.1 FL (ref 8.1–13.5)
POTASSIUM SERPL-SCNC: 4.4 MMOL/L (ref 3.7–5.3)
PROT SERPL-MCNC: 7.5 G/DL (ref 6.6–8.7)
RBC # BLD AUTO: 4.05 M/UL (ref 3.95–5.11)
SODIUM SERPL-SCNC: 143 MMOL/L (ref 136–145)
T4 FREE SERPL-MCNC: 1.3 NG/DL (ref 0.92–1.68)
TRIGL SERPL-MCNC: 103 MG/DL
TSH SERPL DL<=0.05 MIU/L-ACNC: 9.91 UIU/ML (ref 0.27–4.2)
VIT B12 SERPL-MCNC: 840 PG/ML (ref 232–1245)
VLDLC SERPL CALC-MCNC: 21 MG/DL
WBC OTHER # BLD: 5.7 K/UL (ref 3.5–11.3)

## 2024-06-05 PROCEDURE — 77080 DXA BONE DENSITY AXIAL: CPT

## 2024-06-06 DIAGNOSIS — E03.9 HYPOTHYROIDISM, UNSPECIFIED TYPE: Primary | ICD-10-CM

## 2024-06-06 RX ORDER — LEVOTHYROXINE SODIUM 175 UG/1
175 TABLET ORAL DAILY
Qty: 90 TABLET | Refills: 0 | Status: SHIPPED | OUTPATIENT
Start: 2024-06-06

## 2024-06-07 LAB
INSULIN FREE: 8 UIU/ML (ref 3–25)
INSULIN: 11 UIU/ML (ref 3–25)

## 2024-06-10 ENCOUNTER — TELEPHONE (OUTPATIENT)
Dept: FAMILY MEDICINE CLINIC | Age: 54
End: 2024-06-10

## 2024-06-10 DIAGNOSIS — M85.80 OSTEOPENIA, UNSPECIFIED LOCATION: Primary | ICD-10-CM

## 2024-06-27 ENCOUNTER — OFFICE VISIT (OUTPATIENT)
Dept: OBGYN CLINIC | Age: 54
End: 2024-06-27
Payer: COMMERCIAL

## 2024-06-27 ENCOUNTER — HOSPITAL ENCOUNTER (OUTPATIENT)
Dept: SLEEP CENTER | Age: 54
Discharge: HOME | End: 2024-06-29
Attending: INTERNAL MEDICINE
Payer: COMMERCIAL

## 2024-06-27 VITALS — DIASTOLIC BLOOD PRESSURE: 83 MMHG | WEIGHT: 202 LBS | SYSTOLIC BLOOD PRESSURE: 129 MMHG | BODY MASS INDEX: 35.78 KG/M2

## 2024-06-27 DIAGNOSIS — N95.1 MENOPAUSAL SYMPTOMS: ICD-10-CM

## 2024-06-27 DIAGNOSIS — D25.9 UTERINE LEIOMYOMA, UNSPECIFIED LOCATION: ICD-10-CM

## 2024-06-27 DIAGNOSIS — Z01.419 ENCOUNTER FOR GYNECOLOGICAL EXAMINATION WITHOUT ABNORMAL FINDING: Primary | ICD-10-CM

## 2024-06-27 DIAGNOSIS — M85.88 OSTEOPENIA OF LUMBAR SPINE: ICD-10-CM

## 2024-06-27 DIAGNOSIS — E06.3 HYPOTHYROIDISM DUE TO HASHIMOTO'S THYROIDITIS: ICD-10-CM

## 2024-06-27 DIAGNOSIS — G47.33 MILD OBSTRUCTIVE SLEEP APNEA: ICD-10-CM

## 2024-06-27 DIAGNOSIS — E03.8 HYPOTHYROIDISM DUE TO HASHIMOTO'S THYROIDITIS: ICD-10-CM

## 2024-06-27 PROCEDURE — 3079F DIAST BP 80-89 MM HG: CPT | Performed by: OBSTETRICS & GYNECOLOGY

## 2024-06-27 PROCEDURE — 95810 POLYSOM 6/> YRS 4/> PARAM: CPT

## 2024-06-27 PROCEDURE — 99386 PREV VISIT NEW AGE 40-64: CPT | Performed by: OBSTETRICS & GYNECOLOGY

## 2024-06-27 PROCEDURE — 3074F SYST BP LT 130 MM HG: CPT | Performed by: OBSTETRICS & GYNECOLOGY

## 2024-06-27 NOTE — PROGRESS NOTES
OF VISIT:  11/10/21           History and Physical     Nabila Santiago    :  1970  CHIEF COMPLAINT:           Chief Complaint   Patient presents with    Annual Exam       Here for annual Last pap 06/22/15 neg hpv neg   Last mammogram  neg                         HPI :   Nabila Santiago is a 51 y.o. female     The patient was seen and examined.  Per the patient bowels are regular. She has novoiding complaints.  She denies any bloating as well as vaginal discharge. LMP 2020. Being treated for osteoporosis with Fosamax. Had been having some hot flushes when menopause first started which have lessened. Is on Paxil per her PCP. She is wondering about HRT in the setting of osteoporosis. She also has HTN (on Zebeta).     Gynecologic History:  Patient's last menstrual period was 2020.  Sexually Active: Yes  STD History: Yes   Pap smear history: negative   
   SACHA (obstructive sleep apnea)     Osteoporosis     Other osteoporosis without current pathological fracture 2017    Pes anserinus bursitis of right knee 2020    Screen for colon cancer     Urinary retention     Uterine fibroid                                                                    Past Surgical History:   Procedure Laterality Date     SECTION      PARATHYROID GLAND SURGERY Right 3/27/2020    NECK EXPLORATION AND REMOVAL OF PARATHYROID ADENOMA performed by Gael Chan MD at [facility] OR    SINUS SURGERY  2018    UPPER GASTROINTESTINAL ENDOSCOPY N/A 2019    EGD BIOPSY performed by Talha Kincaid MD at [facility] ENDO     Family History   Problem Relation Age of Onset    Heart Disease Mother 50    High Blood Pressure Mother 50    Diabetes Father 60    Heart Disease Father 50    High Blood Pressure Father 50    Diabetes Paternal Uncle     Breast Cancer Maternal Grandmother 70        breast     Diabetes Paternal Grandmother     Stroke Neg Hx     Colon Cancer Neg Hx     Uterine Cancer Neg Hx     Ovarian Cancer Neg Hx      Social History     Tobacco Use   Smoking Status Never   Smokeless Tobacco Never     Social History     Substance and Sexual Activity   Alcohol Use Yes    Alcohol/week: 0.0 standard drinks of alcohol    Comment: social     Current Outpatient Medications   Medication Sig Dispense Refill    LEVOXYL 175 MCG tablet Take 1 tablet by mouth Daily Please provide LEVOXYL Brand name 90 tablet 0    Semaglutide,0.25 or 0.5MG/DOS, (OZEMPIC, 0.25 OR 0.5 MG/DOSE,) 2 MG/1.5ML SOPN Inject 0.5 mg into the skin once a week 3 Adjustable Dose Pre-filled Pen Syringe 3    COZAAR 50 MG tablet TAKE 1 TABLET DAILY 90 tablet 1    PAXIL 10 MG tablet TAKE 1 TABLET DAILY 90 tablet 1    alendronate (FOSAMAX) 70 MG tablet Take 1 tablet by mouth every 7 days Take with a full glass of water upon arising for the day. Consume on an empty stomach at least 30 minutes before the first food, beverage, or

## 2024-06-27 NOTE — PATIENT INSTRUCTIONS
Please remember to get your next mammogram done in February as scheduled.  We will contact you with the results of today's Pap smear.  Please read the information given to you on bioidentical's and menopause.  For more information you may want to go to the website of the American College of obstetrics and gynecology.  Plan on returning to the office in 1 year or as needed.  Happy fourth of July, good luck with the new office move and have a great year!

## 2024-07-02 LAB — STATUS: NORMAL

## 2024-08-13 DIAGNOSIS — E03.9 HYPOTHYROIDISM, UNSPECIFIED TYPE: ICD-10-CM

## 2024-08-14 DIAGNOSIS — E03.9 HYPOTHYROIDISM, UNSPECIFIED TYPE: Primary | ICD-10-CM

## 2024-08-14 RX ORDER — LEVOTHYROXINE SODIUM 175 UG/1
175 TABLET ORAL DAILY
Qty: 90 TABLET | Refills: 0 | OUTPATIENT
Start: 2024-08-14

## 2024-08-15 ENCOUNTER — HOSPITAL ENCOUNTER (OUTPATIENT)
Age: 54
Setting detail: SPECIMEN
Discharge: HOME | End: 2024-08-15

## 2024-08-15 DIAGNOSIS — M85.89 OSTEOPENIA OF MULTIPLE SITES: ICD-10-CM

## 2024-08-15 DIAGNOSIS — E03.9 HYPOTHYROIDISM, UNSPECIFIED TYPE: ICD-10-CM

## 2024-08-15 DIAGNOSIS — E03.9 HYPOTHYROIDISM, UNSPECIFIED TYPE: Primary | ICD-10-CM

## 2024-08-15 LAB
T4 FREE SERPL-MCNC: 1.9 NG/DL (ref 0.92–1.68)
TSH SERPL DL<=0.05 MIU/L-ACNC: 0.02 UIU/ML (ref 0.27–4.2)

## 2024-08-15 RX ORDER — LEVOTHYROXINE SODIUM 150 UG/1
150 TABLET ORAL DAILY
Qty: 90 TABLET | Refills: 0 | Status: SHIPPED | OUTPATIENT
Start: 2024-08-15

## 2024-08-16 LAB
THYROGLOBULIN AB: 21 IU/ML (ref 0–40)
THYROPEROXIDASE AB SERPL IA-ACNC: 7.7 IU/ML (ref 0–25)

## 2024-08-19 ENCOUNTER — OFFICE VISIT (OUTPATIENT)
Dept: FAMILY MEDICINE CLINIC | Age: 54
End: 2024-08-19
Payer: COMMERCIAL

## 2024-08-19 VITALS
SYSTOLIC BLOOD PRESSURE: 100 MMHG | BODY MASS INDEX: 34.94 KG/M2 | HEIGHT: 63 IN | HEART RATE: 72 BPM | WEIGHT: 197.2 LBS | DIASTOLIC BLOOD PRESSURE: 60 MMHG | TEMPERATURE: 97 F | OXYGEN SATURATION: 98 %

## 2024-08-19 DIAGNOSIS — E05.90 HYPERTHYROIDISM: Primary | ICD-10-CM

## 2024-08-19 DIAGNOSIS — M85.89 OSTEOPENIA OF MULTIPLE SITES: ICD-10-CM

## 2024-08-19 DIAGNOSIS — E03.8 HYPOTHYROIDISM DUE TO HASHIMOTO'S THYROIDITIS: ICD-10-CM

## 2024-08-19 DIAGNOSIS — I10 ESSENTIAL HYPERTENSION: ICD-10-CM

## 2024-08-19 DIAGNOSIS — E03.9 ACQUIRED HYPOTHYROIDISM: ICD-10-CM

## 2024-08-19 DIAGNOSIS — Z98.890 S/P PARATHYROIDECTOMY: ICD-10-CM

## 2024-08-19 DIAGNOSIS — E06.3 HYPOTHYROIDISM DUE TO HASHIMOTO'S THYROIDITIS: ICD-10-CM

## 2024-08-19 DIAGNOSIS — E78.5 DYSLIPIDEMIA: ICD-10-CM

## 2024-08-19 DIAGNOSIS — Z86.018 HISTORY OF UTERINE FIBROID: ICD-10-CM

## 2024-08-19 DIAGNOSIS — Z90.89 S/P PARATHYROIDECTOMY: ICD-10-CM

## 2024-08-19 DIAGNOSIS — E66.09 CLASS 2 OBESITY DUE TO EXCESS CALORIES WITHOUT SERIOUS COMORBIDITY WITH BODY MASS INDEX (BMI) OF 35.0 TO 35.9 IN ADULT: ICD-10-CM

## 2024-08-19 PROBLEM — R07.89 OTHER CHEST PAIN: Status: RESOLVED | Noted: 2024-05-29 | Resolved: 2024-08-19

## 2024-08-19 PROCEDURE — 99213 OFFICE O/P EST LOW 20 MIN: CPT | Performed by: FAMILY MEDICINE

## 2024-08-19 PROCEDURE — 3074F SYST BP LT 130 MM HG: CPT | Performed by: FAMILY MEDICINE

## 2024-08-19 PROCEDURE — 3078F DIAST BP <80 MM HG: CPT | Performed by: FAMILY MEDICINE

## 2024-08-19 RX ORDER — SEMAGLUTIDE 1.34 MG/ML
INJECTION, SOLUTION SUBCUTANEOUS
Qty: 4 ADJUSTABLE DOSE PRE-FILLED PEN SYRINGE | Refills: 1 | Status: SHIPPED | OUTPATIENT
Start: 2024-08-19

## 2024-08-19 RX ORDER — SEMAGLUTIDE 1.34 MG/ML
INJECTION, SOLUTION SUBCUTANEOUS
Qty: 4 ADJUSTABLE DOSE PRE-FILLED PEN SYRINGE | Refills: 1 | Status: SHIPPED | OUTPATIENT
Start: 2024-08-19 | End: 2024-08-19 | Stop reason: CLARIF

## 2024-08-19 ASSESSMENT — ENCOUNTER SYMPTOMS
NAUSEA: 0
CONSTIPATION: 0
VOMITING: 0
BACK PAIN: 0
PHOTOPHOBIA: 0
COUGH: 0
BLOOD IN STOOL: 0
EYE DISCHARGE: 0
STRIDOR: 0
EYE REDNESS: 0
SORE THROAT: 0
EYE PAIN: 0
DIARRHEA: 0
SHORTNESS OF BREATH: 0
ABDOMINAL PAIN: 0

## 2024-08-19 ASSESSMENT — PATIENT HEALTH QUESTIONNAIRE - PHQ9
SUM OF ALL RESPONSES TO PHQ QUESTIONS 1-9: 0
1. LITTLE INTEREST OR PLEASURE IN DOING THINGS: NOT AT ALL
1. LITTLE INTEREST OR PLEASURE IN DOING THINGS: NOT AT ALL
2. FEELING DOWN, DEPRESSED OR HOPELESS: NOT AT ALL
2. FEELING DOWN, DEPRESSED OR HOPELESS: NOT AT ALL
SUM OF ALL RESPONSES TO PHQ QUESTIONS 1-9: 0
SUM OF ALL RESPONSES TO PHQ QUESTIONS 1-9: 0
SUM OF ALL RESPONSES TO PHQ9 QUESTIONS 1 & 2: 0
SUM OF ALL RESPONSES TO PHQ QUESTIONS 1-9: 0
SUM OF ALL RESPONSES TO PHQ QUESTIONS 1-9: 0
SUM OF ALL RESPONSES TO PHQ9 QUESTIONS 1 & 2: 0
SUM OF ALL RESPONSES TO PHQ QUESTIONS 1-9: 0

## 2024-08-19 ASSESSMENT — ANXIETY QUESTIONNAIRES
5. BEING SO RESTLESS THAT IT IS HARD TO SIT STILL: NOT AT ALL
4. TROUBLE RELAXING: NOT AT ALL
7. FEELING AFRAID AS IF SOMETHING AWFUL MIGHT HAPPEN: NOT AT ALL
3. WORRYING TOO MUCH ABOUT DIFFERENT THINGS: NOT AT ALL
1. FEELING NERVOUS, ANXIOUS, OR ON EDGE: NOT AT ALL
2. NOT BEING ABLE TO STOP OR CONTROL WORRYING: NOT AT ALL
6. BECOMING EASILY ANNOYED OR IRRITABLE: NOT AT ALL
IF YOU CHECKED OFF ANY PROBLEMS ON THIS QUESTIONNAIRE, HOW DIFFICULT HAVE THESE PROBLEMS MADE IT FOR YOU TO DO YOUR WORK, TAKE CARE OF THINGS AT HOME, OR GET ALONG WITH OTHER PEOPLE: NOT DIFFICULT AT ALL
GAD7 TOTAL SCORE: 0

## 2024-08-19 NOTE — PROGRESS NOTES
Subjective:      Patient ID: Nabilahéctor Santiago Dr. is a 54 y.o. female.    States has been doing well. States asthma has been stable not even taking albuterol PRN.  Saw Dr Cantu earlier- thinking about getting on hormone replacement   States paxil 10 mg. State menopause 4 years ago- paxil helping at low dose with BP, Anxiety  Her thyroid ( TSH) was low - will reduce dose and refer to Dr Meneses.  Currently supposed to take the 150 mg daily - recheck labs in a month   States has been keeping busy - states not exercising.  States wants to increase the ozempic to 1 mg weekly- tolerating the dose at present,, state scurbs appetite and eating healthier.  Taking fosamax weekly- tolerating well.  No GERD sx   Review of Systems   Constitutional:  Negative for chills and fever.   HENT:  Negative for congestion, ear pain, hearing loss, nosebleeds, sore throat and tinnitus.    Eyes:  Negative for photophobia, pain, discharge and redness.   Respiratory:  Negative for cough, shortness of breath and stridor.    Cardiovascular:  Negative for chest pain, palpitations and leg swelling.   Gastrointestinal:  Negative for abdominal pain, blood in stool, constipation, diarrhea, nausea and vomiting.   Endocrine: Negative for polydipsia.   Genitourinary:  Negative for dysuria, flank pain, frequency, hematuria and urgency.   Musculoskeletal:  Negative for back pain, myalgias and neck pain.   Skin:  Negative for rash.   Allergic/Immunologic: Negative for environmental allergies.   Neurological:  Negative for dizziness, tremors, seizures, weakness and headaches.   Hematological:  Does not bruise/bleed easily.   Psychiatric/Behavioral:  Negative for hallucinations and suicidal ideas. The patient is not nervous/anxious.        Objective:   Physical Exam  Constitutional:       Appearance: She is well-developed.   HENT:      Head: Normocephalic and atraumatic.      Right Ear: External ear normal.      Left Ear: External ear normal.

## 2024-08-19 NOTE — PATIENT INSTRUCTIONS
Thank you for choosing [facility].  We know you have options when it comes to your healthcare; we appreciate that you chose us. Our goal is to provide exceptional  service and world class care to every patient.  You will be receiving a survey via email or text message asking for your feedback.  Please take a few minutes to share your thoughts about your recent visit. Your comments helps us understand what we do well and ways we can improve.  Thank you in advance for your valuable feedback.

## 2024-09-15 DIAGNOSIS — M81.0 OSTEOPOROSIS WITHOUT CURRENT PATHOLOGICAL FRACTURE, UNSPECIFIED OSTEOPOROSIS TYPE: ICD-10-CM

## 2024-09-15 RX ORDER — ALENDRONATE SODIUM 70 MG
TABLET ORAL
Qty: 12 TABLET | Refills: 3 | Status: SHIPPED | OUTPATIENT
Start: 2024-09-15

## 2024-09-17 ENCOUNTER — HOSPITAL ENCOUNTER (OUTPATIENT)
Age: 54
Setting detail: SPECIMEN
Discharge: HOME | End: 2024-09-17

## 2024-09-17 LAB
25(OH)D3 SERPL-MCNC: 46.1 NG/ML (ref 30–100)
ANION GAP SERPL CALCULATED.3IONS-SCNC: 10 MMOL/L (ref 9–16)
BUN SERPL-MCNC: 12 MG/DL (ref 6–20)
CA-I BLD-SCNC: 1.27 MMOL/L (ref 1.13–1.33)
CALCIUM SERPL-MCNC: 9.3 MG/DL (ref 8.6–10.4)
CHLORIDE SERPL-SCNC: 108 MMOL/L (ref 98–107)
CO2 SERPL-SCNC: 25 MMOL/L (ref 20–31)
CREAT SERPL-MCNC: 0.8 MG/DL (ref 0.5–0.9)
GFR, ESTIMATED: 84 ML/MIN/1.73M2
GLUCOSE SERPL-MCNC: 86 MG/DL (ref 74–99)
POTASSIUM SERPL-SCNC: 4.5 MMOL/L (ref 3.7–5.3)
PTH-INTACT SERPL-MCNC: 40 PG/ML (ref 15–65)
SODIUM SERPL-SCNC: 143 MMOL/L (ref 136–145)
T4 FREE SERPL-MCNC: 1.6 NG/DL (ref 0.92–1.68)
TSH SERPL DL<=0.05 MIU/L-ACNC: 0.06 UIU/ML (ref 0.27–4.2)

## 2024-09-25 DIAGNOSIS — I10 ESSENTIAL HYPERTENSION: ICD-10-CM

## 2024-09-25 RX ORDER — PAROXETINE HCL 10 MG
10 TABLET ORAL DAILY
Qty: 90 TABLET | Refills: 1 | Status: SHIPPED | OUTPATIENT
Start: 2024-09-25

## 2024-09-25 RX ORDER — LOSARTAN POTASSIUM 50 MG
50 TABLET ORAL DAILY
Qty: 90 TABLET | Refills: 1 | Status: SHIPPED | OUTPATIENT
Start: 2024-09-25

## 2025-02-24 DIAGNOSIS — I10 ESSENTIAL HYPERTENSION: ICD-10-CM

## 2025-02-24 RX ORDER — LOSARTAN POTASSIUM 50 MG
50 TABLET ORAL DAILY
Qty: 90 TABLET | Refills: 0 | Status: SHIPPED | OUTPATIENT
Start: 2025-02-24

## 2025-04-30 ENCOUNTER — OFFICE VISIT (OUTPATIENT)
Dept: FAMILY MEDICINE CLINIC | Age: 55
End: 2025-04-30
Payer: COMMERCIAL

## 2025-04-30 VITALS
SYSTOLIC BLOOD PRESSURE: 110 MMHG | HEIGHT: 62 IN | BODY MASS INDEX: 37.32 KG/M2 | HEART RATE: 77 BPM | OXYGEN SATURATION: 99 % | DIASTOLIC BLOOD PRESSURE: 70 MMHG | WEIGHT: 202.8 LBS

## 2025-04-30 DIAGNOSIS — J30.2 SEASONAL ALLERGIES: ICD-10-CM

## 2025-04-30 DIAGNOSIS — N95.1 MENOPAUSAL STATE: ICD-10-CM

## 2025-04-30 DIAGNOSIS — G47.33: ICD-10-CM

## 2025-04-30 DIAGNOSIS — E05.90 HYPERTHYROIDISM: ICD-10-CM

## 2025-04-30 DIAGNOSIS — M85.89 OSTEOPENIA OF MULTIPLE SITES: ICD-10-CM

## 2025-04-30 DIAGNOSIS — E66.812 CLASS 2 OBESITY DUE TO EXCESS CALORIES WITHOUT SERIOUS COMORBIDITY WITH BODY MASS INDEX (BMI) OF 37.0 TO 37.9 IN ADULT: ICD-10-CM

## 2025-04-30 DIAGNOSIS — Z12.11 SCREEN FOR COLON CANCER: ICD-10-CM

## 2025-04-30 DIAGNOSIS — E55.9 VITAMIN D DEFICIENCY: ICD-10-CM

## 2025-04-30 DIAGNOSIS — Z00.00 ENCOUNTER FOR ANNUAL PHYSICAL EXAM: Primary | ICD-10-CM

## 2025-04-30 DIAGNOSIS — E03.9 ACQUIRED HYPOTHYROIDISM: ICD-10-CM

## 2025-04-30 DIAGNOSIS — E66.09 CLASS 2 OBESITY DUE TO EXCESS CALORIES WITHOUT SERIOUS COMORBIDITY WITH BODY MASS INDEX (BMI) OF 37.0 TO 37.9 IN ADULT: ICD-10-CM

## 2025-04-30 DIAGNOSIS — N95.9 MENOPAUSAL DISORDER: ICD-10-CM

## 2025-04-30 PROBLEM — D25.9 FIBROID UTERUS: Status: RESOLVED | Noted: 2019-07-08 | Resolved: 2025-04-30

## 2025-04-30 PROBLEM — Z86.018 HISTORY OF UTERINE FIBROID: Status: RESOLVED | Noted: 2024-08-19 | Resolved: 2025-04-30

## 2025-04-30 PROCEDURE — 3078F DIAST BP <80 MM HG: CPT | Performed by: FAMILY MEDICINE

## 2025-04-30 PROCEDURE — 99396 PREV VISIT EST AGE 40-64: CPT | Performed by: FAMILY MEDICINE

## 2025-04-30 PROCEDURE — 3074F SYST BP LT 130 MM HG: CPT | Performed by: FAMILY MEDICINE

## 2025-04-30 PROCEDURE — 99213 OFFICE O/P EST LOW 20 MIN: CPT | Performed by: FAMILY MEDICINE

## 2025-04-30 RX ORDER — PAROXETINE 10 MG/1
10 TABLET, FILM COATED ORAL DAILY
Qty: 90 TABLET | Refills: 3 | Status: SHIPPED | OUTPATIENT
Start: 2025-04-30

## 2025-04-30 RX ORDER — PAROXETINE 10 MG/1
10 TABLET, FILM COATED ORAL DAILY
Qty: 30 TABLET | Refills: 0 | Status: SHIPPED | OUTPATIENT
Start: 2025-04-30

## 2025-04-30 SDOH — ECONOMIC STABILITY: FOOD INSECURITY: WITHIN THE PAST 12 MONTHS, YOU WORRIED THAT YOUR FOOD WOULD RUN OUT BEFORE YOU GOT MONEY TO BUY MORE.: NEVER TRUE

## 2025-04-30 SDOH — ECONOMIC STABILITY: FOOD INSECURITY: WITHIN THE PAST 12 MONTHS, THE FOOD YOU BOUGHT JUST DIDN'T LAST AND YOU DIDN'T HAVE MONEY TO GET MORE.: NEVER TRUE

## 2025-04-30 ASSESSMENT — ENCOUNTER SYMPTOMS
NAUSEA: 0
ABDOMINAL PAIN: 0
EYE DISCHARGE: 0
BLOOD IN STOOL: 0
EYE PAIN: 0
BACK PAIN: 0
DIARRHEA: 0
PHOTOPHOBIA: 0
CONSTIPATION: 0
VOMITING: 0
EYE REDNESS: 0
STRIDOR: 0
COUGH: 0
SHORTNESS OF BREATH: 0
SORE THROAT: 0

## 2025-04-30 ASSESSMENT — PATIENT HEALTH QUESTIONNAIRE - PHQ9
SUM OF ALL RESPONSES TO PHQ QUESTIONS 1-9: 0
1. LITTLE INTEREST OR PLEASURE IN DOING THINGS: NOT AT ALL
2. FEELING DOWN, DEPRESSED OR HOPELESS: NOT AT ALL
2. FEELING DOWN, DEPRESSED OR HOPELESS: NOT AT ALL
1. LITTLE INTEREST OR PLEASURE IN DOING THINGS: NOT AT ALL
SUM OF ALL RESPONSES TO PHQ9 QUESTIONS 1 & 2: 0
SUM OF ALL RESPONSES TO PHQ QUESTIONS 1-9: 0

## 2025-04-30 NOTE — PATIENT INSTRUCTIONS
Thank you for choosing [facility].  We know you have options when it comes to your healthcare; we appreciate that you chose us. Our goal is to provide exceptional  service and world class care to every patient.  You will be receiving a survey via email or text message asking for your feedback.  Please take a few minutes to share your thoughts about your recent visit. Your comments helps us understand what we do well and ways we can improve.  Thank you in advance for your valuable feedback.

## 2025-04-30 NOTE — PROGRESS NOTES
Subjective:      Patient ID: Nabila COLON Dr. Pamela is a 54 y.o. female.    State s was seeing endo for her thyroid - currently on 150 mcg - last TSH was low- will reorder.  States had Hep B series 20 years ago as well as shingles vaccine at [facility] per patient- but not showing up.  Mood and anxiety are ok.  Currently on paxil 10 mg- tried weaning self off 6 months ago but did not tolerate.Was off paxil x 5 days and started with Hot flashes and night sweats.  State stakes   Stella asked her to stop fosamax - as she was on fosamax x 7 years.    Review of Systems   Constitutional:  Negative for chills and fever.   HENT:  Negative for congestion, ear pain, hearing loss, nosebleeds, sore throat and tinnitus.    Eyes:  Negative for photophobia, pain, discharge and redness.   Respiratory:  Negative for cough, shortness of breath and stridor.    Cardiovascular:  Negative for chest pain, palpitations and leg swelling.   Gastrointestinal:  Negative for abdominal pain, blood in stool, constipation, diarrhea, nausea and vomiting.   Endocrine: Negative for polydipsia.   Genitourinary:  Negative for dysuria, flank pain, frequency, hematuria and urgency.   Musculoskeletal:  Negative for back pain, myalgias and neck pain.   Skin:  Negative for rash.   Allergic/Immunologic: Negative for environmental allergies.   Neurological:  Negative for dizziness, tremors, seizures, weakness and headaches.   Hematological:  Does not bruise/bleed easily.   Psychiatric/Behavioral:  Negative for hallucinations and suicidal ideas. The patient is not nervous/anxious.        Objective:   Physical Exam  Constitutional:       Appearance: She is well-developed.   HENT:      Head: Normocephalic and atraumatic.      Right Ear: External ear normal.      Left Ear: External ear normal.      Nose: Nose normal.      Mouth/Throat:      Pharynx: No oropharyngeal exudate.   Eyes:      General: No scleral icterus.        Right eye: No discharge.         Left eye: No

## 2025-05-01 ENCOUNTER — HOSPITAL ENCOUNTER (OUTPATIENT)
Facility: CLINIC | Age: 55
Discharge: HOME | End: 2025-05-01
Payer: COMMERCIAL

## 2025-05-01 DIAGNOSIS — E05.90 HYPERTHYROIDISM: ICD-10-CM

## 2025-05-01 LAB — TSH SERPL DL<=0.05 MIU/L-ACNC: 0.57 UIU/ML (ref 0.27–4.2)

## 2025-05-01 PROCEDURE — 84443 ASSAY THYROID STIM HORMONE: CPT

## 2025-05-23 DIAGNOSIS — E03.9 HYPOTHYROIDISM, UNSPECIFIED TYPE: ICD-10-CM

## 2025-05-24 RX ORDER — LEVOTHYROXINE SODIUM 150 UG/1
150 TABLET ORAL DAILY
Qty: 90 TABLET | Refills: 0 | Status: SHIPPED | OUTPATIENT
Start: 2025-05-24

## 2025-05-30 PROBLEM — Z00.00 ENCOUNTER FOR ANNUAL PHYSICAL EXAM: Status: RESOLVED | Noted: 2020-08-17 | Resolved: 2025-05-30

## 2025-06-09 RX ORDER — PAROXETINE 10 MG/1
10 TABLET, FILM COATED ORAL DAILY
Qty: 90 TABLET | Refills: 1 | Status: SHIPPED | OUTPATIENT
Start: 2025-06-09

## 2025-06-30 ENCOUNTER — OFFICE VISIT (OUTPATIENT)
Dept: FAMILY MEDICINE CLINIC | Age: 55
End: 2025-06-30
Payer: COMMERCIAL

## 2025-06-30 VITALS
WEIGHT: 205.4 LBS | DIASTOLIC BLOOD PRESSURE: 83 MMHG | HEART RATE: 64 BPM | SYSTOLIC BLOOD PRESSURE: 121 MMHG | OXYGEN SATURATION: 98 % | HEIGHT: 62 IN | BODY MASS INDEX: 37.8 KG/M2

## 2025-06-30 DIAGNOSIS — I10 ESSENTIAL HYPERTENSION: ICD-10-CM

## 2025-06-30 DIAGNOSIS — E03.9 ACQUIRED HYPOTHYROIDISM: ICD-10-CM

## 2025-06-30 DIAGNOSIS — M85.89 OSTEOPENIA OF MULTIPLE SITES: ICD-10-CM

## 2025-06-30 DIAGNOSIS — Z13.1 SCREENING FOR DIABETES MELLITUS: ICD-10-CM

## 2025-06-30 DIAGNOSIS — Z98.890 HISTORY OF PARATHYROID SURGERY: ICD-10-CM

## 2025-06-30 DIAGNOSIS — E66.09 CLASS 2 OBESITY DUE TO EXCESS CALORIES WITHOUT SERIOUS COMORBIDITY WITH BODY MASS INDEX (BMI) OF 37.0 TO 37.9 IN ADULT: ICD-10-CM

## 2025-06-30 DIAGNOSIS — E55.9 VITAMIN D DEFICIENCY: ICD-10-CM

## 2025-06-30 DIAGNOSIS — Z91.89 AT RISK FOR CORONARY ARTERY DISEASE: ICD-10-CM

## 2025-06-30 DIAGNOSIS — E78.5 DYSLIPIDEMIA: Primary | ICD-10-CM

## 2025-06-30 DIAGNOSIS — G47.33: ICD-10-CM

## 2025-06-30 DIAGNOSIS — G47.33 OSA (OBSTRUCTIVE SLEEP APNEA): ICD-10-CM

## 2025-06-30 DIAGNOSIS — E66.812 CLASS 2 OBESITY DUE TO EXCESS CALORIES WITHOUT SERIOUS COMORBIDITY WITH BODY MASS INDEX (BMI) OF 37.0 TO 37.9 IN ADULT: ICD-10-CM

## 2025-06-30 DIAGNOSIS — R11.0 NAUSEA: ICD-10-CM

## 2025-06-30 DIAGNOSIS — E53.9 VITAMIN B DEFICIENCY: ICD-10-CM

## 2025-06-30 PROCEDURE — 99214 OFFICE O/P EST MOD 30 MIN: CPT | Performed by: FAMILY MEDICINE

## 2025-06-30 PROCEDURE — 3074F SYST BP LT 130 MM HG: CPT | Performed by: FAMILY MEDICINE

## 2025-06-30 PROCEDURE — 3079F DIAST BP 80-89 MM HG: CPT | Performed by: FAMILY MEDICINE

## 2025-06-30 RX ORDER — LOSARTAN POTASSIUM 50 MG/1
50 TABLET ORAL DAILY
Qty: 90 TABLET | Refills: 1 | Status: SHIPPED | OUTPATIENT
Start: 2025-06-30

## 2025-06-30 RX ORDER — AMLODIPINE BESYLATE 5 MG/1
5 TABLET ORAL DAILY
Qty: 90 TABLET | Refills: 1 | Status: SHIPPED | OUTPATIENT
Start: 2025-06-30

## 2025-06-30 RX ORDER — ONDANSETRON 4 MG/1
4 TABLET, FILM COATED ORAL DAILY PRN
Qty: 30 TABLET | Refills: 0 | Status: SHIPPED | OUTPATIENT
Start: 2025-06-30

## 2025-06-30 ASSESSMENT — ENCOUNTER SYMPTOMS
DIARRHEA: 0
ABDOMINAL PAIN: 0
EYE PAIN: 0
ANAL BLEEDING: 0
COUGH: 0
CHEST TIGHTNESS: 0
EYE REDNESS: 0
TROUBLE SWALLOWING: 0
EYE DISCHARGE: 0
SINUS PRESSURE: 0
BLOOD IN STOOL: 0
NAUSEA: 0
COLOR CHANGE: 0
BACK PAIN: 0
CONSTIPATION: 0
SHORTNESS OF BREATH: 0
ABDOMINAL DISTENTION: 0
VOICE CHANGE: 0
RECTAL PAIN: 0
VOMITING: 0

## 2025-06-30 ASSESSMENT — PATIENT HEALTH QUESTIONNAIRE - PHQ9
SUM OF ALL RESPONSES TO PHQ QUESTIONS 1-9: 0
SUM OF ALL RESPONSES TO PHQ QUESTIONS 1-9: 0
2. FEELING DOWN, DEPRESSED OR HOPELESS: NOT AT ALL
1. LITTLE INTEREST OR PLEASURE IN DOING THINGS: NOT AT ALL
1. LITTLE INTEREST OR PLEASURE IN DOING THINGS: NOT AT ALL
SUM OF ALL RESPONSES TO PHQ QUESTIONS 1-9: 0
2. FEELING DOWN, DEPRESSED OR HOPELESS: NOT AT ALL
SUM OF ALL RESPONSES TO PHQ QUESTIONS 1-9: 0

## 2025-06-30 NOTE — PROGRESS NOTES
Progress Note    Date:6/30/2025         Patient Name:Nabila COLON Dr. Pamela       YOB: 1970       Age:55 y.o.    Subjective/HPI     Chief Complaint   Patient presents with    Hypertension    Weight Management     Wants medication     Sleep Problem     Snoring and daytime sleeping       HPI HA and blurry vision on and off- states BP was elevated 150/100, 140/96  State shas been increasing dose of losartan to 3-4 times normal. Daytime sleepiness,   Feels SACHA is not controlled. States Recently had sleep study completed and   did not go back for follow up .  Years ago seen Dr Liang and her sleep study was showed mild SACHA- CPAP   was advised but last study recommended Titration by Dr Porter cardiac stress in Nov- was ok Dr Boone was cardiology.  States taking Hydralazine 25 mg - prescribed by her daughter states   took a dose yesterday and today am- last= dose losartan was yesterday am.  Does not want to use CPAP - wants to lose weight - will order zepbound as patient request.  States ok starting amlodipine but does not want to go up on losartan to 100 mg and will cont 50 mg   States has been taking 150 mcg - states has been self prescribing this dose for almost 20 years-   in the past TFT was abnormal but now last time was ok.  Sleep has been poor recently- states in bed 8-10 hours but no quality sleep.  Was seeing Dr Cantu - states needs follow up with Gyn soon.  Was supposed to call Dr Meneses but was waiting for them to call.  Was referred for parathyroid surgery follow up from 5 years ago and abnor thyroid  Review of Systems   Review of Systems   Constitutional:  Negative for activity change, appetite change and fatigue.   HENT:  Negative for dental problem, ear pain, hearing loss, postnasal drip, sinus pressure, sneezing, tinnitus, trouble swallowing and voice change.    Eyes:  Negative for pain, discharge, redness and visual disturbance.   Respiratory:  Negative for cough, chest tightness and

## 2025-06-30 NOTE — PATIENT INSTRUCTIONS
Thank you for choosing [facility].  We know you have options when it comes to your healthcare; we appreciate that you chose us. Our goal is to provide exceptional  service and world class care to every patient.  You will be receiving a survey via email or text message asking for your feedback.  Please take a few minutes to share your thoughts about your recent visit. Your comments helps us understand what we do well and ways we can improve.  Thank you in advance for your valuable feedback.

## (undated) DEVICE — ADHESIVE SKIN CLSR 0.7ML TOP DERMBND ADV

## (undated) DEVICE — DRAPE,INSTRUMENT,MAGNETIC,10X16: Brand: MEDLINE

## (undated) DEVICE — CANNULA NSL AD TBNG L7FT PVC STR NONFLARED PRNG O2 DEL W STD

## (undated) DEVICE — PROBE 8225101 5PK STD PRASS FL TIP ROHS

## (undated) DEVICE — SKIN PREP TRAY W/CHG: Brand: MEDLINE INDUSTRIES, INC.

## (undated) DEVICE — SURGICAL PROCEDURE KIT BASIN MAJ SET UP

## (undated) DEVICE — Z INACTIVE USE 2525529 CONNECTOR TBNG FOR O2

## (undated) DEVICE — BITEBLOCK 54FR W/ DENT RIM BLOX

## (undated) DEVICE — GLOVE ORANGE PI 8   MSG9080

## (undated) DEVICE — Z DUPLICATE USE 2527422 TUBING O2 STD 7 FT EXTN NO CRUSH VISUAL CNTRST LF

## (undated) DEVICE — JELLY,LUBE,STERILE,FLIP TOP,TUBE,2-OZ: Brand: MEDLINE

## (undated) DEVICE — GLOVE SURG SZ 65 L12IN FNGR THK79MIL GRN LTX FREE

## (undated) DEVICE — SPONGE,PEANUT,XRAY,ST,SM,3/8",5/CARD: Brand: MEDLINE INDUSTRIES, INC.

## (undated) DEVICE — INTENDED FOR TISSUE SEPARATION, AND OTHER PROCEDURES THAT REQUIRE A SHARP SURGICAL BLADE TO PUNCTURE OR CUT.: Brand: BARD-PARKER ® CARBON RIB-BACK BLADES

## (undated) DEVICE — YANKAUER,FLEXIBLE HANDLE,FINE CAPACITY: Brand: MEDLINE

## (undated) DEVICE — GLOVE SURG SZ 8 CRM LTX FREE POLYISOPRENE POLYMER BEAD ANTI

## (undated) DEVICE — CONMED ACCESSORY ELECTRODE, NEEDLE ELECTRODE: Brand: CONMED

## (undated) DEVICE — POUCH INSTR W6.75XL11.5IN FRST 2 PKT ADH FOR ORTH AND

## (undated) DEVICE — BLANKET WRM W40.2XL55.9IN IORT LO BODY + MISTRAL AIR

## (undated) DEVICE — POSITIONER HD W8XH4XL8.5IN RASPBERRY FOAM SLT

## (undated) DEVICE — DRAPE THYROID

## (undated) DEVICE — DEFENDO AIR WATER SUCTION AND BIOPSY VALVE KIT FOR  OLYMPUS: Brand: DEFENDO AIR/WATER/SUCTION AND BIOPSY VALVE

## (undated) DEVICE — GOWN,POLY REINFORCED,LG: Brand: MEDLINE

## (undated) DEVICE — CONTAINER,SPECIMEN,OR STERILE,4OZ: Brand: MEDLINE

## (undated) DEVICE — FORCEPS BX L240CM JAW DIA2.8MM L CAP W/ NDL MIC MESH TOOTH

## (undated) DEVICE — EMG TUBE 8229707 NIM TRIVANTAGE 7.0MM ID: Brand: NIM TRIVANTAGE™

## (undated) DEVICE — SEALER LAP SM L18.8CM OPN JAW HAND/FOOT SWCH FORCETRIAD

## (undated) DEVICE — GARMENT,MEDLINE,DVT,INT,CALF,MED, GEN2: Brand: MEDLINE

## (undated) DEVICE — GLOVE SURG SZ 75 CRM LTX FREE POLYISOPRENE POLYMER BEAD ANTI